# Patient Record
Sex: MALE | Race: OTHER | Employment: FULL TIME | URBAN - METROPOLITAN AREA
[De-identification: names, ages, dates, MRNs, and addresses within clinical notes are randomized per-mention and may not be internally consistent; named-entity substitution may affect disease eponyms.]

---

## 2018-11-25 ENCOUNTER — APPOINTMENT (OUTPATIENT)
Dept: GENERAL RADIOLOGY | Age: 43
End: 2018-11-25
Attending: FAMILY MEDICINE
Payer: OTHER MISCELLANEOUS

## 2018-11-25 ENCOUNTER — HOSPITAL ENCOUNTER (OUTPATIENT)
Age: 43
Discharge: HOME | End: 2018-11-25
Attending: FAMILY MEDICINE
Payer: OTHER MISCELLANEOUS

## 2018-11-25 VITALS
WEIGHT: 160 LBS | HEART RATE: 63 BPM | TEMPERATURE: 98 F | HEIGHT: 66 IN | BODY MASS INDEX: 25.71 KG/M2 | RESPIRATION RATE: 16 BRPM | SYSTOLIC BLOOD PRESSURE: 142 MMHG | DIASTOLIC BLOOD PRESSURE: 88 MMHG | OXYGEN SATURATION: 97 %
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
## 2018-11-25 DIAGNOSIS — M62.830 BACK MUSCLE SPASM: Primary | ICD-10-CM

## 2018-11-25 DIAGNOSIS — M54.5 ACUTE LOW BACK PAIN, UNSPECIFIED BACK PAIN LATERALITY, WITH SCIATICA PRESENCE UNSPECIFIED: ICD-10-CM

## 2018-11-25 PROCEDURE — 99203 OFFICE O/P NEW LOW 30 MIN: CPT

## 2018-11-25 PROCEDURE — 72110 X-RAY EXAM L-2 SPINE 4/>VWS: CPT | Performed by: FAMILY MEDICINE

## 2018-11-25 PROCEDURE — 99204 OFFICE O/P NEW MOD 45 MIN: CPT

## 2018-11-25 RX ORDER — CYCLOBENZAPRINE HCL 10 MG
10 TABLET ORAL 3 TIMES DAILY PRN
Qty: 15 TABLET | Refills: 0 | Status: SHIPPED | OUTPATIENT
Start: 2018-11-25 | End: 2018-11-30

## 2018-11-25 NOTE — ED INITIAL ASSESSMENT (HPI)
Patient reports he had an accident today at work. Patient reports he was picking up cases and hurt his back.

## 2018-11-25 NOTE — ED PROVIDER NOTES
Patient Seen in: [facility]    History   Patient presents with:  Back Pain (musculoskeletal)    Stated Complaint: W/C INJURY, BACK PAIN    HPI  This is a 49-year-old male here with complaints of lower back pain/spasm status post cyanosis, clubbing, or edema bilaterally . MS +5/5 both upper and lower extremities and FROM noted.      Back:   Symmetric - no scoliosis or kyphosis   No vertebral tenderness noted   No step offs   No swelling or masses   Tenderness noted over the paravert correlation recommended. MRI could be performed for further evaluation as clinically directed. Mild degenerative disc space loss and minimal endplate spurring scattered in the lumbar spine. Normal mineralization. Unremarkable soft tissues.   No pars def injury    Disposition:  Discharge  11/25/2018  1:11 pm    Follow-up:  AMADA [address]  [address] [address]  [phone]    On 11/27/2018  For re-check on symptoms        Medications Prescribed:  Current Discharge Medication List    S

## 2018-11-27 NOTE — ED NOTES
Called   Kurt Meza ([id number])  In three way call. Pt states that pain is  Worse pain scale of  9/10. Pt  Took ibuprofen 2 tabs this morning. Pt was instructed to take 4 tabs every 8 hrs to be taken with food.  Pt was also instructed to take  Cy

## 2020-12-30 ENCOUNTER — TRANSCRIPTION ENCOUNTER (OUTPATIENT)
Age: 45
End: 2020-12-30

## 2021-08-27 ENCOUNTER — TRANSCRIPTION ENCOUNTER (OUTPATIENT)
Age: 46
End: 2021-08-27

## 2021-09-09 ENCOUNTER — TRANSCRIPTION ENCOUNTER (OUTPATIENT)
Age: 46
End: 2021-09-09

## 2021-09-23 ENCOUNTER — TRANSCRIPTION ENCOUNTER (OUTPATIENT)
Age: 46
End: 2021-09-23

## 2021-09-30 ENCOUNTER — TRANSCRIPTION ENCOUNTER (OUTPATIENT)
Age: 46
End: 2021-09-30

## 2021-10-15 ENCOUNTER — TRANSCRIPTION ENCOUNTER (OUTPATIENT)
Age: 46
End: 2021-10-15

## 2021-10-21 ENCOUNTER — TRANSCRIPTION ENCOUNTER (OUTPATIENT)
Age: 46
End: 2021-10-21

## 2022-09-13 ENCOUNTER — NON-APPOINTMENT (OUTPATIENT)
Age: 47
End: 2022-09-13

## 2022-09-13 ENCOUNTER — APPOINTMENT (OUTPATIENT)
Dept: INTERNAL MEDICINE | Facility: CLINIC | Age: 47
End: 2022-09-13

## 2022-09-13 VITALS
OXYGEN SATURATION: 97 % | HEART RATE: 91 BPM | SYSTOLIC BLOOD PRESSURE: 180 MMHG | TEMPERATURE: 98 F | BODY MASS INDEX: 26.53 KG/M2 | DIASTOLIC BLOOD PRESSURE: 109 MMHG | HEIGHT: 67 IN | WEIGHT: 169 LBS

## 2022-09-13 VITALS
HEIGHT: 67 IN | HEART RATE: 91 BPM | TEMPERATURE: 98 F | OXYGEN SATURATION: 97 % | WEIGHT: 169 LBS | SYSTOLIC BLOOD PRESSURE: 160 MMHG | RESPIRATION RATE: 18 BRPM | BODY MASS INDEX: 26.53 KG/M2 | DIASTOLIC BLOOD PRESSURE: 95 MMHG

## 2022-09-13 DIAGNOSIS — Z13.220 ENCOUNTER FOR SCREENING FOR LIPOID DISORDERS: ICD-10-CM

## 2022-09-13 DIAGNOSIS — Z78.9 OTHER SPECIFIED HEALTH STATUS: ICD-10-CM

## 2022-09-13 DIAGNOSIS — Z13.21 ENCOUNTER FOR SCREENING FOR NUTRITIONAL DISORDER: ICD-10-CM

## 2022-09-13 DIAGNOSIS — R03.0 ELEVATED BLOOD-PRESSURE READING, W/OUT DIAGNOSIS OF HYPERTENSION: ICD-10-CM

## 2022-09-13 DIAGNOSIS — Z13.29 ENCOUNTER FOR SCREENING FOR OTHER SUSPECTED ENDOCRINE DISORDER: ICD-10-CM

## 2022-09-13 DIAGNOSIS — Z13.1 ENCOUNTER FOR SCREENING FOR DIABETES MELLITUS: ICD-10-CM

## 2022-09-13 DIAGNOSIS — Z82.61 FAMILY HISTORY OF ARTHRITIS: ICD-10-CM

## 2022-09-13 DIAGNOSIS — N20.0 CALCULUS OF KIDNEY: ICD-10-CM

## 2022-09-13 DIAGNOSIS — Z83.3 FAMILY HISTORY OF DIABETES MELLITUS: ICD-10-CM

## 2022-09-13 PROCEDURE — 93000 ELECTROCARDIOGRAM COMPLETE: CPT | Mod: 59

## 2022-09-13 PROCEDURE — G0442 ANNUAL ALCOHOL SCREEN 15 MIN: CPT

## 2022-09-13 PROCEDURE — 99386 PREV VISIT NEW AGE 40-64: CPT | Mod: 25

## 2022-09-13 PROCEDURE — 36415 COLL VENOUS BLD VENIPUNCTURE: CPT

## 2022-09-13 PROCEDURE — G0444 DEPRESSION SCREEN ANNUAL: CPT | Mod: 59

## 2022-09-13 NOTE — COUNSELING
[Fall prevention counseling provided] : Fall prevention counseling provided [Adequate lighting] : Adequate lighting [No throw rugs] : No throw rugs [Use proper foot wear] : Use proper foot wear [Use recommended devices] : Use recommended devices [None] : None

## 2022-09-13 NOTE — PLAN
[FreeTextEntry1] : Well adult exam is performed. Recommend  to do a blood test today, further management will depend on the blood test results. \par Has an elevated BP reading, EKG was done and reviewed NSR 85 bpm, no acute st-t changes, recommend  low salt diet, decrease caffeine intake. Start taking HCTZ 12.5mg qd, monitor BP reading at home\par F/u with GI(screening colonoscopy)\par  RTC to f/u in 2 wks. Patient is verbalized understanding\par

## 2022-09-13 NOTE — HISTORY OF PRESENT ILLNESS
[FreeTextEntry1] : Adult well exam [de-identified] : Mr. BRUNER COLON 47 year  male  with a no significant  PMH  present to the office for a physical exam.  Patient feels good, denies complaints at present time. Did not see PCP for a long period of time\par

## 2022-09-13 NOTE — HEALTH RISK ASSESSMENT
[Good] : ~his/her~  mood as  good [Never] : Never [Yes] : Yes [Monthly or less (1 pt)] : Monthly or less (1 point) [1 or 2 (0 pts)] : 1 or 2 (0 points) [Never (0 pts)] : Never (0 points) [No] : In the past 12 months have you used drugs other than those required for medical reasons? No [No falls in past year] : Patient reported no falls in the past year [0] : 2) Feeling down, depressed, or hopeless: Not at all (0) [PHQ-2 Negative - No further assessment needed] : PHQ-2 Negative - No further assessment needed [No Retinopathy] : No retinopathy [None] : None [With Family] : lives with family [# of Members in Household ___] :  household currently consist of [unfilled] member(s) [Employed] : employed [Graduate School] : graduate school [] :  [# Of Children ___] : has [unfilled] children [Sexually Active] : sexually active [Feels Safe at Home] : Feels safe at home [Fully functional (bathing, dressing, toileting, transferring, walking, feeding)] : Fully functional (bathing, dressing, toileting, transferring, walking, feeding) [Fully functional (using the telephone, shopping, preparing meals, housekeeping, doing laundry, using] : Fully functional and needs no help or supervision to perform IADLs (using the telephone, shopping, preparing meals, housekeeping, doing laundry, using transportation, managing medications and managing finances) [Smoke Detector] : smoke detector [Carbon Monoxide Detector] : carbon monoxide detector [Guns at Home] : guns at home [Seat Belt] :  uses seat belt [Sunscreen] : uses sunscreen [Aggressive treatment] : aggressive treatment [I will adhere to the patient's wishes.] : I will adhere to the patient's wishes. [HIV Test offered] : HIV Test offered [Hepatitis C test offered] : Hepatitis C test offered [Audit-CScore] : 1 [de-identified] : walks [de-identified] : Regular diet (high sodium) [AHT8Kbtwa] : 0 [EyeExamDate] : 09/2021 [Change in mental status noted] : No change in mental status noted [Language] : denies difficulty with language [Learning/Retaining New Information] : denies difficulty learning/retaining new information [Handling Complex Tasks] : denies difficulty handling complex tasks [Reports changes in hearing] : Reports no changes in hearing [Reports changes in vision] : Reports no changes in vision [Reports changes in dental health] : Reports no changes in dental health [TB Exposure] : is not being exposed to tuberculosis [Caregiver Concerns] : does not have caregiver concerns [ColonoscopyComments] : never had [FreeTextEntry2] : Teacher [FreeTextEntry3] : 2 [de-identified] : Father in law owns gun (former ) [FreeTextEntry4] : Do not have a health care proxy

## 2022-09-19 LAB
25(OH)D3 SERPL-MCNC: 34 NG/ML
ALBUMIN SERPL ELPH-MCNC: 4.2 G/DL
ALP BLD-CCNC: 54 U/L
ALT SERPL-CCNC: 26 U/L
ANION GAP SERPL CALC-SCNC: 13 MMOL/L
APPEARANCE: CLEAR
AST SERPL-CCNC: 23 U/L
BASOPHILS # BLD AUTO: 0.08 K/UL
BASOPHILS NFR BLD AUTO: 0.9 %
BILIRUB SERPL-MCNC: 0.4 MG/DL
BILIRUBIN URINE: NEGATIVE
BLOOD URINE: NEGATIVE
BUN SERPL-MCNC: 9 MG/DL
CALCIUM SERPL-MCNC: 9.4 MG/DL
CHLORIDE SERPL-SCNC: 106 MMOL/L
CHOLEST SERPL-MCNC: 145 MG/DL
CO2 SERPL-SCNC: 25 MMOL/L
COLOR: NORMAL
CREAT SERPL-MCNC: 1.04 MG/DL
EGFR: 88 ML/MIN/1.73M2
EOSINOPHIL # BLD AUTO: 0.17 K/UL
EOSINOPHIL NFR BLD AUTO: 2 %
ESTIMATED AVERAGE GLUCOSE: 137 MG/DL
GLUCOSE QUALITATIVE U: NORMAL
GLUCOSE SERPL-MCNC: 129 MG/DL
HBA1C MFR BLD HPLC: 6.4 %
HBV CORE IGG+IGM SER QL: NONREACTIVE
HBV SURFACE AB SER QL: NONREACTIVE
HBV SURFACE AG SER QL: NONREACTIVE
HCT VFR BLD CALC: 43.3 %
HCV AB SER QL: NONREACTIVE
HCV S/CO RATIO: 0.08 S/CO
HDLC SERPL-MCNC: 61 MG/DL
HGB BLD-MCNC: 14.4 G/DL
HIV1+2 AB SPEC QL IA.RAPID: NONREACTIVE
HSV 1+2 IGG SER IA-IMP: NEGATIVE
HSV 1+2 IGG SER IA-IMP: NEGATIVE
HSV1 IGG SER QL: 0.54 INDEX
HSV2 IGG SER QL: 0.33 INDEX
IMM GRANULOCYTES NFR BLD AUTO: 0.2 %
IRON SATN MFR SERPL: 22 %
IRON SERPL-MCNC: 72 UG/DL
KETONES URINE: NEGATIVE
LDLC SERPL CALC-MCNC: 72 MG/DL
LEUKOCYTE ESTERASE URINE: NEGATIVE
LYMPHOCYTES # BLD AUTO: 2.05 K/UL
LYMPHOCYTES NFR BLD AUTO: 23.6 %
MAN DIFF?: NORMAL
MCHC RBC-ENTMCNC: 30.3 PG
MCHC RBC-ENTMCNC: 33.3 GM/DL
MCV RBC AUTO: 91 FL
MONOCYTES # BLD AUTO: 1 K/UL
MONOCYTES NFR BLD AUTO: 11.5 %
NEUTROPHILS # BLD AUTO: 5.36 K/UL
NEUTROPHILS NFR BLD AUTO: 61.8 %
NITRITE URINE: NEGATIVE
NONHDLC SERPL-MCNC: 84 MG/DL
PH URINE: 6
PLATELET # BLD AUTO: 246 K/UL
POTASSIUM SERPL-SCNC: 4.2 MMOL/L
PROT SERPL-MCNC: 6.5 G/DL
PROTEIN URINE: NEGATIVE
PSA FREE FLD-MCNC: 31 %
PSA FREE SERPL-MCNC: 0.41 NG/ML
PSA SERPL-MCNC: 1.3 NG/ML
RBC # BLD: 4.76 M/UL
RBC # FLD: 14.5 %
SODIUM SERPL-SCNC: 143 MMOL/L
SPECIFIC GRAVITY URINE: 1.02
T PALLIDUM AB SER QL IA: NEGATIVE
TIBC SERPL-MCNC: 327 UG/DL
TRIGL SERPL-MCNC: 61 MG/DL
TSH SERPL-ACNC: 1.27 UIU/ML
UIBC SERPL-MCNC: 256 UG/DL
UROBILINOGEN URINE: NORMAL
WBC # FLD AUTO: 8.68 K/UL

## 2022-10-12 RX ORDER — HYDROCHLOROTHIAZIDE 12.5 MG/1
12.5 CAPSULE ORAL DAILY
Qty: 30 | Refills: 3 | Status: ACTIVE | COMMUNITY
Start: 2022-09-13 | End: 1900-01-01

## 2023-12-09 ENCOUNTER — LABORATORY RESULT (OUTPATIENT)
Age: 48
End: 2023-12-09

## 2023-12-09 ENCOUNTER — NON-APPOINTMENT (OUTPATIENT)
Age: 48
End: 2023-12-09

## 2023-12-09 ENCOUNTER — APPOINTMENT (OUTPATIENT)
Dept: FAMILY MEDICINE | Facility: CLINIC | Age: 48
End: 2023-12-09
Payer: COMMERCIAL

## 2023-12-09 VITALS
SYSTOLIC BLOOD PRESSURE: 162 MMHG | DIASTOLIC BLOOD PRESSURE: 94 MMHG | WEIGHT: 159.44 LBS | RESPIRATION RATE: 16 BRPM | HEART RATE: 91 BPM | TEMPERATURE: 97.2 F | BODY MASS INDEX: 25.02 KG/M2 | OXYGEN SATURATION: 100 % | HEIGHT: 67 IN

## 2023-12-09 DIAGNOSIS — Z12.5 ENCOUNTER FOR SCREENING FOR MALIGNANT NEOPLASM OF PROSTATE: ICD-10-CM

## 2023-12-09 DIAGNOSIS — Z00.00 ENCOUNTER FOR GENERAL ADULT MEDICAL EXAMINATION W/OUT ABNORMAL FINDINGS: ICD-10-CM

## 2023-12-09 DIAGNOSIS — E55.9 VITAMIN D DEFICIENCY, UNSPECIFIED: ICD-10-CM

## 2023-12-09 DIAGNOSIS — Z82.49 FAMILY HISTORY OF ISCHEMIC HEART DISEASE AND OTHER DISEASES OF THE CIRCULATORY SYSTEM: ICD-10-CM

## 2023-12-09 DIAGNOSIS — Z12.11 ENCOUNTER FOR SCREENING FOR MALIGNANT NEOPLASM OF COLON: ICD-10-CM

## 2023-12-09 DIAGNOSIS — I10 ESSENTIAL (PRIMARY) HYPERTENSION: ICD-10-CM

## 2023-12-09 DIAGNOSIS — R73.03 PREDIABETES.: ICD-10-CM

## 2023-12-09 PROCEDURE — 99213 OFFICE O/P EST LOW 20 MIN: CPT | Mod: 25

## 2023-12-09 PROCEDURE — 99396 PREV VISIT EST AGE 40-64: CPT | Mod: 25

## 2023-12-13 DIAGNOSIS — D64.9 ANEMIA, UNSPECIFIED: ICD-10-CM

## 2023-12-13 LAB
25(OH)D3 SERPL-MCNC: 22.8 NG/ML
ALBUMIN SERPL ELPH-MCNC: 4.1 G/DL
ALP BLD-CCNC: 63 U/L
ALT SERPL-CCNC: 12 U/L
ANION GAP SERPL CALC-SCNC: 11 MMOL/L
APPEARANCE: CLEAR
AST SERPL-CCNC: 17 U/L
BASOPHILS # BLD AUTO: 0.09 K/UL
BASOPHILS NFR BLD AUTO: 0.7 %
BILIRUB SERPL-MCNC: 0.3 MG/DL
BILIRUBIN URINE: NEGATIVE
BLOOD URINE: ABNORMAL
BUN SERPL-MCNC: 14 MG/DL
CALCIUM SERPL-MCNC: 8.9 MG/DL
CHLORIDE SERPL-SCNC: 105 MMOL/L
CHOLEST SERPL-MCNC: 131 MG/DL
CO2 SERPL-SCNC: 23 MMOL/L
COLOR: YELLOW
CREAT SERPL-MCNC: 0.92 MG/DL
EGFR: 101 ML/MIN/1.73M2
EOSINOPHIL # BLD AUTO: 0.18 K/UL
EOSINOPHIL NFR BLD AUTO: 1.5 %
ESTIMATED AVERAGE GLUCOSE: 146 MG/DL
GLUCOSE QUALITATIVE U: NEGATIVE MG/DL
GLUCOSE SERPL-MCNC: 127 MG/DL
HBA1C MFR BLD HPLC: 6.7 %
HCT VFR BLD CALC: 33.8 %
HDLC SERPL-MCNC: 63 MG/DL
HGB BLD-MCNC: 9.4 G/DL
IMM GRANULOCYTES NFR BLD AUTO: 0.2 %
KETONES URINE: NEGATIVE MG/DL
LDLC SERPL CALC-MCNC: 58 MG/DL
LEUKOCYTE ESTERASE URINE: NEGATIVE
LYMPHOCYTES # BLD AUTO: 1.64 K/UL
LYMPHOCYTES NFR BLD AUTO: 13.6 %
MAN DIFF?: NORMAL
MCHC RBC-ENTMCNC: 20.3 PG
MCHC RBC-ENTMCNC: 27.8 GM/DL
MCV RBC AUTO: 73.2 FL
MONOCYTES # BLD AUTO: 1.36 K/UL
MONOCYTES NFR BLD AUTO: 11.2 %
NEUTROPHILS # BLD AUTO: 8.8 K/UL
NEUTROPHILS NFR BLD AUTO: 72.8 %
NITRITE URINE: NEGATIVE
NONHDLC SERPL-MCNC: 67 MG/DL
PH URINE: 6
PLATELET # BLD AUTO: 418 K/UL
POTASSIUM SERPL-SCNC: 4.4 MMOL/L
PROT SERPL-MCNC: 6.6 G/DL
PROTEIN URINE: NEGATIVE MG/DL
PSA SERPL-MCNC: 2.13 NG/ML
RBC # BLD: 4.62 M/UL
RBC # FLD: 19.8 %
SODIUM SERPL-SCNC: 139 MMOL/L
SPECIFIC GRAVITY URINE: 1.01
TRIGL SERPL-MCNC: 33 MG/DL
TSH SERPL-ACNC: 1.11 UIU/ML
UROBILINOGEN URINE: 0.2 MG/DL
WBC # FLD AUTO: 12.1 K/UL

## 2024-03-18 ENCOUNTER — RX RENEWAL (OUTPATIENT)
Age: 49
End: 2024-03-18

## 2024-06-17 RX ORDER — LOSARTAN POTASSIUM 50 MG/1
50 TABLET, FILM COATED ORAL
Qty: 30 | Refills: 0 | Status: ACTIVE | COMMUNITY
Start: 2023-12-09 | End: 1900-01-01

## 2024-07-10 ENCOUNTER — NON-APPOINTMENT (OUTPATIENT)
Age: 49
End: 2024-07-10

## 2024-07-10 ENCOUNTER — APPOINTMENT (OUTPATIENT)
Dept: FAMILY MEDICINE | Facility: CLINIC | Age: 49
End: 2024-07-10
Payer: COMMERCIAL

## 2024-07-10 ENCOUNTER — LABORATORY RESULT (OUTPATIENT)
Age: 49
End: 2024-07-10

## 2024-07-10 VITALS
OXYGEN SATURATION: 98 % | BODY MASS INDEX: 21.5 KG/M2 | HEIGHT: 67 IN | DIASTOLIC BLOOD PRESSURE: 80 MMHG | WEIGHT: 137 LBS | HEART RATE: 103 BPM | TEMPERATURE: 98.3 F | RESPIRATION RATE: 16 BRPM | SYSTOLIC BLOOD PRESSURE: 131 MMHG

## 2024-07-10 DIAGNOSIS — R10.9 UNSPECIFIED ABDOMINAL PAIN: ICD-10-CM

## 2024-07-10 DIAGNOSIS — E11.9 TYPE 2 DIABETES MELLITUS W/OUT COMPLICATIONS: ICD-10-CM

## 2024-07-10 DIAGNOSIS — R55 SYNCOPE AND COLLAPSE: ICD-10-CM

## 2024-07-10 DIAGNOSIS — R63.4 ABNORMAL WEIGHT LOSS: ICD-10-CM

## 2024-07-10 DIAGNOSIS — D64.9 ANEMIA, UNSPECIFIED: ICD-10-CM

## 2024-07-10 PROCEDURE — 99214 OFFICE O/P EST MOD 30 MIN: CPT | Mod: 25

## 2024-07-10 PROCEDURE — 93000 ELECTROCARDIOGRAM COMPLETE: CPT

## 2024-07-10 PROCEDURE — 36415 COLL VENOUS BLD VENIPUNCTURE: CPT

## 2024-07-11 ENCOUNTER — INPATIENT (INPATIENT)
Facility: HOSPITAL | Age: 49
LOS: 1 days | Discharge: HOME | End: 2024-07-13
Attending: HOSPITALIST | Admitting: HOSPITALIST
Payer: COMMERCIAL

## 2024-07-11 ENCOUNTER — NON-APPOINTMENT (OUTPATIENT)
Age: 49
End: 2024-07-11

## 2024-07-11 ENCOUNTER — TRANSCRIPTION ENCOUNTER (OUTPATIENT)
Age: 49
End: 2024-07-11

## 2024-07-11 VITALS
WEIGHT: 138.01 LBS | HEIGHT: 66 IN | DIASTOLIC BLOOD PRESSURE: 85 MMHG | TEMPERATURE: 98 F | SYSTOLIC BLOOD PRESSURE: 129 MMHG | HEART RATE: 97 BPM | OXYGEN SATURATION: 100 % | RESPIRATION RATE: 19 BRPM

## 2024-07-11 DIAGNOSIS — R55 SYNCOPE AND COLLAPSE: ICD-10-CM

## 2024-07-11 DIAGNOSIS — I10 ESSENTIAL (PRIMARY) HYPERTENSION: ICD-10-CM

## 2024-07-11 DIAGNOSIS — K31.89 OTHER DISEASES OF STOMACH AND DUODENUM: ICD-10-CM

## 2024-07-11 DIAGNOSIS — D64.9 ANEMIA, UNSPECIFIED: ICD-10-CM

## 2024-07-11 LAB
ALBUMIN SERPL ELPH-MCNC: 3.1 G/DL — LOW (ref 3.3–5)
ALBUMIN SERPL ELPH-MCNC: 3.8 G/DL
ALP BLD-CCNC: 71 U/L
ALP SERPL-CCNC: 60 U/L — SIGNIFICANT CHANGE UP (ref 40–120)
ALT FLD-CCNC: 11 U/L — LOW (ref 12–78)
ALT SERPL-CCNC: 10 U/L
ANION GAP SERPL CALC-SCNC: 19 MMOL/L
ANION GAP SERPL CALC-SCNC: 6 MMOL/L — SIGNIFICANT CHANGE UP (ref 5–17)
ANISOCYTOSIS BLD QL: SIGNIFICANT CHANGE UP
APTT BLD: 32.4 SEC — SIGNIFICANT CHANGE UP (ref 24.5–35.6)
AST SERPL-CCNC: 14 U/L
AST SERPL-CCNC: 9 U/L — LOW (ref 15–37)
BASOPHILS # BLD AUTO: 0.05 K/UL — SIGNIFICANT CHANGE UP (ref 0–0.2)
BASOPHILS NFR BLD AUTO: 0.4 % — SIGNIFICANT CHANGE UP (ref 0–2)
BASOPHILS NFR BLD AUTO: 0.7 %
BILIRUB SERPL-MCNC: 0.4 MG/DL
BILIRUB SERPL-MCNC: 0.5 MG/DL — SIGNIFICANT CHANGE UP (ref 0.2–1.2)
BLD GP AB SCN SERPL QL: SIGNIFICANT CHANGE UP
BUN SERPL-MCNC: 13 MG/DL
BUN SERPL-MCNC: 13 MG/DL — SIGNIFICANT CHANGE UP (ref 7–23)
CALCIUM SERPL-MCNC: 8.7 MG/DL
CALCIUM SERPL-MCNC: 9.4 MG/DL — SIGNIFICANT CHANGE UP (ref 8.5–10.1)
CHLORIDE SERPL-SCNC: 105 MMOL/L — SIGNIFICANT CHANGE UP (ref 96–108)
CHLORIDE SERPL-SCNC: 99 MMOL/L
CHOLEST SERPL-MCNC: 128 MG/DL
CO2 SERPL-SCNC: 22 MMOL/L
CO2 SERPL-SCNC: 28 MMOL/L — SIGNIFICANT CHANGE UP (ref 22–31)
CREAT SERPL-MCNC: 0.82 MG/DL — SIGNIFICANT CHANGE UP (ref 0.5–1.3)
CREAT SERPL-MCNC: 0.9 MG/DL
CREAT SPEC-SCNC: 122 MG/DL
DACRYOCYTES BLD QL SMEAR: SLIGHT — SIGNIFICANT CHANGE UP
EGFR: 103 ML/MIN/1.73M2
EGFR: 87 ML/MIN/1.73M2 — SIGNIFICANT CHANGE UP
EGFR: 87 ML/MIN/1.73M2 — SIGNIFICANT CHANGE UP
EOSINOPHIL # BLD AUTO: 0.05 K/UL — SIGNIFICANT CHANGE UP (ref 0–0.5)
EOSINOPHIL # BLD AUTO: 0.06 K/UL
EOSINOPHIL NFR BLD AUTO: 0.4 % — SIGNIFICANT CHANGE UP (ref 0–6)
EOSINOPHIL NFR BLD AUTO: 0.5 %
ESTIMATED AVERAGE GLUCOSE: 123 MG/DL
FERRITIN SERPL-MCNC: 2 NG/ML — LOW (ref 30–400)
FERRITIN SERPL-MCNC: 5 NG/ML
FOLATE SERPL-MCNC: 6.5 NG/ML
GLUCOSE SERPL-MCNC: 128 MG/DL — HIGH (ref 70–99)
GLUCOSE SERPL-MCNC: 73 MG/DL
HBA1C MFR BLD HPLC: 5.9 %
HCT VFR BLD CALC: 20.2 % — CRITICAL LOW (ref 34.5–45)
HCT VFR BLD CALC: 20.3 %
HGB BLD-MCNC: 4.9 G/DL
HGB BLD-MCNC: 5.2 G/DL — CRITICAL LOW (ref 11.5–15.5)
HYPOCHROMIA BLD QL: SIGNIFICANT CHANGE UP
IMM GRANULOCYTES NFR BLD AUTO: 0.4 %
IMM GRANULOCYTES NFR BLD AUTO: 0.4 % — SIGNIFICANT CHANGE UP (ref 0–0.9)
INR BLD: 1.09 RATIO — SIGNIFICANT CHANGE UP (ref 0.85–1.18)
IRON SATN MFR SERPL: 2 % — LOW (ref 16–55)
IRON SATN MFR SERPL: 3 %
IRON SATN MFR SERPL: 7 UG/DL — LOW (ref 45–165)
IRON SERPL-MCNC: 9 UG/DL
LACTATE SERPL-SCNC: 1.2 MMOL/L — SIGNIFICANT CHANGE UP (ref 0.7–2)
LDLC SERPL CALC-MCNC: 63 MG/DL
LYMPHOCYTES # BLD AUTO: 1 K/UL — SIGNIFICANT CHANGE UP (ref 1–3.3)
LYMPHOCYTES # BLD AUTO: 1.55 K/UL
LYMPHOCYTES # BLD AUTO: 9 % — LOW (ref 13–44)
LYMPHOCYTES NFR BLD AUTO: 12.7 %
MAN DIFF?: NORMAL
MANUAL SMEAR VERIFICATION: SIGNIFICANT CHANGE UP
MCHC RBC-ENTMCNC: 16.2 PG
MCHC RBC-ENTMCNC: 16.6 PG — LOW (ref 27–34)
MCHC RBC-ENTMCNC: 24.1 GM/DL
MCHC RBC-ENTMCNC: 25.7 G/DL — LOW (ref 32–36)
MCV RBC AUTO: 64.3 FL — LOW (ref 80–100)
MCV RBC AUTO: 67 FL
MICROALBUMIN 24H UR DL<=1MG/L-MCNC: <1.2 MG/DL
MICROALBUMIN/CREAT 24H UR-RTO: NORMAL MG/G
MICROCYTES BLD QL: SIGNIFICANT CHANGE UP
MONOCYTES # BLD AUTO: 1.01 K/UL — HIGH (ref 0–0.9)
MONOCYTES # BLD AUTO: 1.27 K/UL
MONOCYTES NFR BLD AUTO: 10.4 %
MONOCYTES NFR BLD AUTO: 9 % — SIGNIFICANT CHANGE UP (ref 2–14)
NEUTROPHILS # BLD AUTO: 9.02 K/UL — HIGH (ref 1.8–7.4)
NEUTROPHILS # BLD AUTO: 9.17 K/UL
NEUTROPHILS NFR BLD AUTO: 80.8 % — HIGH (ref 43–77)
NONHDLC SERPL-MCNC: 74 MG/DL
NRBC # BLD: 0 /100 WBCS — SIGNIFICANT CHANGE UP (ref 0–0)
NRBC BLD-RTO: 0 /100 WBCS — SIGNIFICANT CHANGE UP (ref 0–0)
OB PNL STL: POSITIVE
OVALOCYTES BLD QL SMEAR: SLIGHT — SIGNIFICANT CHANGE UP
PLAT MORPH BLD: NORMAL — SIGNIFICANT CHANGE UP
PLATELET # BLD AUTO: 518 K/UL — HIGH (ref 150–400)
PLATELET # BLD AUTO: 527 K/UL
POIKILOCYTOSIS BLD QL AUTO: SIGNIFICANT CHANGE UP
POLYCHROMASIA BLD QL SMEAR: SLIGHT — SIGNIFICANT CHANGE UP
POTASSIUM SERPL-MCNC: 4 MMOL/L — SIGNIFICANT CHANGE UP (ref 3.5–5.3)
POTASSIUM SERPL-SCNC: 4 MMOL/L — SIGNIFICANT CHANGE UP (ref 3.5–5.3)
POTASSIUM SERPL-SCNC: 4.6 MMOL/L
PROT SERPL-MCNC: 6.8 G/DL
PROT SERPL-MCNC: 7.2 GM/DL — SIGNIFICANT CHANGE UP (ref 6–8.3)
PROTHROM AB SERPL-ACNC: 13.1 SEC — HIGH (ref 9.5–13)
RBC # BLD: 3.03 M/UL
RBC # BLD: 3.14 M/UL — LOW (ref 3.8–5.2)
RBC # FLD: 20.2 % — HIGH (ref 10.3–14.5)
RBC # FLD: 20.6 %
RBC BLD AUTO: ABNORMAL
SODIUM SERPL-SCNC: 139 MMOL/L — SIGNIFICANT CHANGE UP (ref 135–145)
TARGETS BLD QL SMEAR: SLIGHT — SIGNIFICANT CHANGE UP
TIBC SERPL-MCNC: 327 UG/DL — SIGNIFICANT CHANGE UP (ref 220–430)
TIBC SERPL-MCNC: 329 UG/DL
TRANSFERRIN SERPL-MCNC: 260 MG/DL
TRIGL SERPL-MCNC: 51 MG/DL
TROPONIN I, HIGH SENSITIVITY RESULT: 4.7 NG/L — SIGNIFICANT CHANGE UP
TSH SERPL-ACNC: 1.61 UIU/ML
UIBC SERPL-MCNC: 320 UG/DL — SIGNIFICANT CHANGE UP (ref 110–370)
UIBC SERPL-MCNC: 321 UG/DL
VIT B12 SERPL-MCNC: 331 PG/ML
WBC # BLD: 11.17 K/UL — HIGH (ref 3.8–10.5)
WBC # FLD AUTO: 11.17 K/UL — HIGH (ref 3.8–10.5)
WBC # FLD AUTO: 12.18 K/UL

## 2024-07-11 PROCEDURE — 99291 CRITICAL CARE FIRST HOUR: CPT

## 2024-07-11 PROCEDURE — 71045 X-RAY EXAM CHEST 1 VIEW: CPT | Mod: 26

## 2024-07-11 PROCEDURE — 99222 1ST HOSP IP/OBS MODERATE 55: CPT

## 2024-07-11 PROCEDURE — 74177 CT ABD & PELVIS W/CONTRAST: CPT | Mod: 26,MC

## 2024-07-11 PROCEDURE — 93010 ELECTROCARDIOGRAM REPORT: CPT

## 2024-07-11 RX ORDER — LOSARTAN POTASSIUM 100 MG/1
50 TABLET, FILM COATED ORAL DAILY
Refills: 0 | Status: DISCONTINUED | OUTPATIENT
Start: 2024-07-11 | End: 2024-07-12

## 2024-07-11 RX ORDER — ACETAMINOPHEN 500 MG/5ML
650 LIQUID (ML) ORAL EVERY 6 HOURS
Refills: 0 | Status: DISCONTINUED | OUTPATIENT
Start: 2024-07-11 | End: 2024-07-12

## 2024-07-11 RX ORDER — MELATONIN 5 MG
3 TABLET ORAL AT BEDTIME
Refills: 0 | Status: DISCONTINUED | OUTPATIENT
Start: 2024-07-11 | End: 2024-07-12

## 2024-07-11 RX ORDER — SODIUM CHLORIDE 9 G/1000ML
1000 INJECTION, SOLUTION INTRAVENOUS
Refills: 0 | Status: DISCONTINUED | OUTPATIENT
Start: 2024-07-11 | End: 2024-07-12

## 2024-07-11 RX ADMIN — Medication 40 MILLIGRAM(S): at 14:07

## 2024-07-11 RX ADMIN — SODIUM CHLORIDE 125 MILLILITER(S): 9 INJECTION, SOLUTION INTRAVENOUS at 15:06

## 2024-07-12 LAB
ALBUMIN SERPL ELPH-MCNC: 2.7 G/DL — LOW (ref 3.3–5)
ALP SERPL-CCNC: 54 U/L — SIGNIFICANT CHANGE UP (ref 40–120)
ALT FLD-CCNC: 10 U/L — LOW (ref 12–78)
ANION GAP SERPL CALC-SCNC: 4 MMOL/L — LOW (ref 5–17)
AST SERPL-CCNC: 6 U/L — LOW (ref 15–37)
BILIRUB SERPL-MCNC: 0.7 MG/DL — SIGNIFICANT CHANGE UP (ref 0.2–1.2)
BUN SERPL-MCNC: 7 MG/DL — SIGNIFICANT CHANGE UP (ref 7–23)
CALCIUM SERPL-MCNC: 8.5 MG/DL — SIGNIFICANT CHANGE UP (ref 8.5–10.1)
CHLORIDE SERPL-SCNC: 107 MMOL/L — SIGNIFICANT CHANGE UP (ref 96–108)
CO2 SERPL-SCNC: 29 MMOL/L — SIGNIFICANT CHANGE UP (ref 22–31)
CREAT SERPL-MCNC: 0.84 MG/DL — SIGNIFICANT CHANGE UP (ref 0.5–1.3)
EGFR: 106 ML/MIN/1.73M2 — SIGNIFICANT CHANGE UP
EGFR: 106 ML/MIN/1.73M2 — SIGNIFICANT CHANGE UP
FOLATE SERPL-MCNC: 8.3 NG/ML — SIGNIFICANT CHANGE UP
GLUCOSE SERPL-MCNC: 169 MG/DL — HIGH (ref 70–99)
HCT VFR BLD CALC: 23.2 % — LOW (ref 39–50)
HCT VFR BLD CALC: 26.9 % — LOW (ref 39–50)
HCT VFR BLD CALC: 27.1 % — LOW (ref 39–50)
HGB BLD-MCNC: 6.8 G/DL — CRITICAL LOW (ref 13–17)
HGB BLD-MCNC: 7.9 G/DL — LOW (ref 13–17)
HGB BLD-MCNC: 8 G/DL — LOW (ref 13–17)
MAGNESIUM SERPL-MCNC: 2.1 MG/DL — SIGNIFICANT CHANGE UP (ref 1.6–2.6)
MCHC RBC-ENTMCNC: 19.9 PG — LOW (ref 27–34)
MCHC RBC-ENTMCNC: 20.7 PG — LOW (ref 27–34)
MCHC RBC-ENTMCNC: 21.3 PG — LOW (ref 27–34)
MCHC RBC-ENTMCNC: 29.2 G/DL — LOW (ref 32–36)
MCHC RBC-ENTMCNC: 29.3 G/DL — LOW (ref 32–36)
MCHC RBC-ENTMCNC: 29.7 G/DL — LOW (ref 32–36)
MCV RBC AUTO: 68 FL — LOW (ref 80–100)
MCV RBC AUTO: 71.1 FL — LOW (ref 80–100)
MCV RBC AUTO: 71.7 FL — LOW (ref 80–100)
NRBC # BLD: 0 /100 WBCS — SIGNIFICANT CHANGE UP (ref 0–0)
NRBC BLD-RTO: 0 /100 WBCS — SIGNIFICANT CHANGE UP (ref 0–0)
PHOSPHATE SERPL-MCNC: 2.6 MG/DL — SIGNIFICANT CHANGE UP (ref 2.5–4.5)
PLATELET # BLD AUTO: 416 K/UL — HIGH (ref 150–400)
PLATELET # BLD AUTO: 430 K/UL — HIGH (ref 150–400)
PLATELET # BLD AUTO: 438 K/UL — HIGH (ref 150–400)
POTASSIUM SERPL-MCNC: 4.1 MMOL/L — SIGNIFICANT CHANGE UP (ref 3.5–5.3)
POTASSIUM SERPL-SCNC: 4.1 MMOL/L — SIGNIFICANT CHANGE UP (ref 3.5–5.3)
PROT SERPL-MCNC: 6.2 GM/DL — SIGNIFICANT CHANGE UP (ref 6–8.3)
RBC # BLD: 3.41 M/UL — LOW (ref 4.2–5.8)
RBC # BLD: 3.75 M/UL — LOW (ref 4.2–5.8)
RBC # BLD: 3.81 M/UL — LOW (ref 4.2–5.8)
RBC # FLD: 23.8 % — HIGH (ref 10.3–14.5)
RBC # FLD: 24.2 % — HIGH (ref 10.3–14.5)
RBC # FLD: 24.5 % — HIGH (ref 10.3–14.5)
SODIUM SERPL-SCNC: 140 MMOL/L — SIGNIFICANT CHANGE UP (ref 135–145)
VIT B12 SERPL-MCNC: 302 PG/ML — SIGNIFICANT CHANGE UP (ref 232–1245)
WBC # BLD: 9.1 K/UL — SIGNIFICANT CHANGE UP (ref 3.8–10.5)
WBC # BLD: 9.61 K/UL — SIGNIFICANT CHANGE UP (ref 3.8–10.5)
WBC # BLD: 9.71 K/UL — SIGNIFICANT CHANGE UP (ref 3.8–10.5)
WBC # FLD AUTO: 9.1 K/UL — SIGNIFICANT CHANGE UP (ref 3.8–10.5)
WBC # FLD AUTO: 9.61 K/UL — SIGNIFICANT CHANGE UP (ref 3.8–10.5)
WBC # FLD AUTO: 9.71 K/UL — SIGNIFICANT CHANGE UP (ref 3.8–10.5)

## 2024-07-12 PROCEDURE — 88342 IMHCHEM/IMCYTCHM 1ST ANTB: CPT | Mod: 26

## 2024-07-12 PROCEDURE — 99232 SBSQ HOSP IP/OBS MODERATE 35: CPT

## 2024-07-12 PROCEDURE — 88305 TISSUE EXAM BY PATHOLOGIST: CPT | Mod: 26

## 2024-07-12 PROCEDURE — 88341 IMHCHEM/IMCYTCHM EA ADD ANTB: CPT | Mod: 26

## 2024-07-12 PROCEDURE — 99223 1ST HOSP IP/OBS HIGH 75: CPT

## 2024-07-12 PROCEDURE — 88360 TUMOR IMMUNOHISTOCHEM/MANUAL: CPT | Mod: 26,1L,59

## 2024-07-12 PROCEDURE — 88312 SPECIAL STAINS GROUP 1: CPT | Mod: 26

## 2024-07-12 RX ORDER — ONDANSETRON HCL/PF 4 MG/2 ML
4 VIAL (ML) INJECTION ONCE
Refills: 0 | Status: DISCONTINUED | OUTPATIENT
Start: 2024-07-12 | End: 2024-07-12

## 2024-07-12 RX ORDER — SODIUM CHLORIDE 9 G/1000ML
1000 INJECTION, SOLUTION INTRAVENOUS
Refills: 0 | Status: DISCONTINUED | OUTPATIENT
Start: 2024-07-12 | End: 2024-07-12

## 2024-07-12 RX ORDER — MELATONIN 5 MG
3 TABLET ORAL AT BEDTIME
Refills: 0 | Status: DISCONTINUED | OUTPATIENT
Start: 2024-07-12 | End: 2024-07-13

## 2024-07-12 RX ORDER — FENTANYL CITRATE-0.9 % NACL/PF 100MCG/2ML
50 SYRINGE (ML) INTRAVENOUS ONCE
Refills: 0 | Status: DISCONTINUED | OUTPATIENT
Start: 2024-07-12 | End: 2024-07-12

## 2024-07-12 RX ORDER — FENTANYL CITRATE-0.9 % NACL/PF 100MCG/2ML
25 SYRINGE (ML) INTRAVENOUS ONCE
Refills: 0 | Status: DISCONTINUED | OUTPATIENT
Start: 2024-07-12 | End: 2024-07-12

## 2024-07-12 RX ORDER — ONDANSETRON HCL/PF 4 MG/2 ML
4 VIAL (ML) INJECTION ONCE
Refills: 0 | Status: COMPLETED | OUTPATIENT
Start: 2024-07-12 | End: 2024-07-12

## 2024-07-12 RX ADMIN — Medication 4 MILLIGRAM(S): at 15:43

## 2024-07-12 RX ADMIN — SODIUM CHLORIDE 75 MILLILITER(S): 9 INJECTION, SOLUTION INTRAVENOUS at 20:16

## 2024-07-12 RX ADMIN — Medication 40 MILLIGRAM(S): at 08:15

## 2024-07-12 RX ADMIN — Medication 3 MILLIGRAM(S): at 22:27

## 2024-07-12 RX ADMIN — SODIUM CHLORIDE 125 MILLILITER(S): 9 INJECTION, SOLUTION INTRAVENOUS at 08:30

## 2024-07-13 ENCOUNTER — TRANSCRIPTION ENCOUNTER (OUTPATIENT)
Age: 49
End: 2024-07-13

## 2024-07-13 VITALS
TEMPERATURE: 98 F | HEART RATE: 69 BPM | SYSTOLIC BLOOD PRESSURE: 115 MMHG | RESPIRATION RATE: 17 BRPM | DIASTOLIC BLOOD PRESSURE: 72 MMHG | OXYGEN SATURATION: 100 %

## 2024-07-13 LAB
ANION GAP SERPL CALC-SCNC: 5 MMOL/L — SIGNIFICANT CHANGE UP (ref 5–17)
BUN SERPL-MCNC: 10 MG/DL — SIGNIFICANT CHANGE UP (ref 7–23)
CALCIUM SERPL-MCNC: 9.1 MG/DL — SIGNIFICANT CHANGE UP (ref 8.5–10.1)
CHLORIDE SERPL-SCNC: 106 MMOL/L — SIGNIFICANT CHANGE UP (ref 96–108)
CO2 SERPL-SCNC: 29 MMOL/L — SIGNIFICANT CHANGE UP (ref 22–31)
CREAT SERPL-MCNC: 0.86 MG/DL — SIGNIFICANT CHANGE UP (ref 0.5–1.3)
EGFR: 105 ML/MIN/1.73M2 — SIGNIFICANT CHANGE UP
EGFR: 105 ML/MIN/1.73M2 — SIGNIFICANT CHANGE UP
GLUCOSE SERPL-MCNC: 113 MG/DL — HIGH (ref 70–99)
HCT VFR BLD CALC: 31.9 % — LOW (ref 39–50)
HGB BLD-MCNC: 9.3 G/DL — LOW (ref 13–17)
MAGNESIUM SERPL-MCNC: 2.3 MG/DL — SIGNIFICANT CHANGE UP (ref 1.6–2.6)
MCHC RBC-ENTMCNC: 21.4 PG — LOW (ref 27–34)
MCHC RBC-ENTMCNC: 29.2 G/DL — LOW (ref 32–36)
MCV RBC AUTO: 73.5 FL — LOW (ref 80–100)
NRBC # BLD: 0 /100 WBCS — SIGNIFICANT CHANGE UP (ref 0–0)
NRBC BLD-RTO: 0 /100 WBCS — SIGNIFICANT CHANGE UP (ref 0–0)
PHOSPHATE SERPL-MCNC: 4.5 MG/DL — SIGNIFICANT CHANGE UP (ref 2.5–4.5)
PLATELET # BLD AUTO: 433 K/UL — HIGH (ref 150–400)
POTASSIUM SERPL-MCNC: 4.5 MMOL/L — SIGNIFICANT CHANGE UP (ref 3.5–5.3)
POTASSIUM SERPL-SCNC: 4.5 MMOL/L — SIGNIFICANT CHANGE UP (ref 3.5–5.3)
RBC # BLD: 4.34 M/UL — SIGNIFICANT CHANGE UP (ref 4.2–5.8)
RBC # FLD: 24.2 % — HIGH (ref 10.3–14.5)
SODIUM SERPL-SCNC: 140 MMOL/L — SIGNIFICANT CHANGE UP (ref 135–145)
WBC # BLD: 9.36 K/UL — SIGNIFICANT CHANGE UP (ref 3.8–10.5)
WBC # FLD AUTO: 9.36 K/UL — SIGNIFICANT CHANGE UP (ref 3.8–10.5)

## 2024-07-13 PROCEDURE — 99239 HOSP IP/OBS DSCHRG MGMT >30: CPT

## 2024-07-13 RX ADMIN — Medication 40 MILLIGRAM(S): at 12:15

## 2024-07-15 ENCOUNTER — NON-APPOINTMENT (OUTPATIENT)
Age: 49
End: 2024-07-15

## 2024-07-15 RX ORDER — LOSARTAN POTASSIUM 100 MG/1
1 TABLET, FILM COATED ORAL
Refills: 0 | DISCHARGE

## 2024-07-16 ENCOUNTER — NON-APPOINTMENT (OUTPATIENT)
Age: 49
End: 2024-07-16

## 2024-07-17 ENCOUNTER — NON-APPOINTMENT (OUTPATIENT)
Age: 49
End: 2024-07-17

## 2024-07-17 PROBLEM — I10 ESSENTIAL (PRIMARY) HYPERTENSION: Chronic | Status: ACTIVE | Noted: 2024-07-11

## 2024-07-18 ENCOUNTER — NON-APPOINTMENT (OUTPATIENT)
Age: 49
End: 2024-07-18

## 2024-07-18 DIAGNOSIS — C16.9 MALIGNANT NEOPLASM OF STOMACH, UNSPECIFIED: ICD-10-CM

## 2024-07-21 DIAGNOSIS — D64.9 ANEMIA, UNSPECIFIED: ICD-10-CM

## 2024-07-21 DIAGNOSIS — I10 ESSENTIAL (PRIMARY) HYPERTENSION: ICD-10-CM

## 2024-07-21 DIAGNOSIS — K25.4 CHRONIC OR UNSPECIFIED GASTRIC ULCER WITH HEMORRHAGE: ICD-10-CM

## 2024-07-21 DIAGNOSIS — E44.0 MODERATE PROTEIN-CALORIE MALNUTRITION: ICD-10-CM

## 2024-07-21 DIAGNOSIS — D50.0 IRON DEFICIENCY ANEMIA SECONDARY TO BLOOD LOSS (CHRONIC): ICD-10-CM

## 2024-07-21 DIAGNOSIS — C16.5 MALIGNANT NEOPLASM OF LESSER CURVATURE OF STOMACH, UNSPECIFIED: ICD-10-CM

## 2024-07-22 ENCOUNTER — NON-APPOINTMENT (OUTPATIENT)
Age: 49
End: 2024-07-22

## 2024-07-23 LAB — SURGICAL PATHOLOGY STUDY: SIGNIFICANT CHANGE UP

## 2024-07-25 ENCOUNTER — NON-APPOINTMENT (OUTPATIENT)
Age: 49
End: 2024-07-25

## 2024-07-25 ENCOUNTER — APPOINTMENT (OUTPATIENT)
Dept: SURGICAL ONCOLOGY | Facility: CLINIC | Age: 49
End: 2024-07-25

## 2024-07-25 ENCOUNTER — APPOINTMENT (OUTPATIENT)
Dept: CT IMAGING | Facility: CLINIC | Age: 49
End: 2024-07-25

## 2024-07-30 ENCOUNTER — APPOINTMENT (OUTPATIENT)
Dept: SURGICAL ONCOLOGY | Facility: CLINIC | Age: 49
End: 2024-07-30
Payer: COMMERCIAL

## 2024-07-30 ENCOUNTER — APPOINTMENT (OUTPATIENT)
Dept: HEMATOLOGY ONCOLOGY | Facility: CLINIC | Age: 49
End: 2024-07-30
Payer: COMMERCIAL

## 2024-07-30 ENCOUNTER — LABORATORY RESULT (OUTPATIENT)
Age: 49
End: 2024-07-30

## 2024-07-30 VITALS
BODY MASS INDEX: 21.82 KG/M2 | WEIGHT: 139 LBS | SYSTOLIC BLOOD PRESSURE: 132 MMHG | OXYGEN SATURATION: 100 % | HEIGHT: 67 IN | RESPIRATION RATE: 16 BRPM | DIASTOLIC BLOOD PRESSURE: 88 MMHG | HEART RATE: 89 BPM | TEMPERATURE: 98.1 F

## 2024-07-30 DIAGNOSIS — R11.2 NAUSEA WITH VOMITING, UNSPECIFIED: ICD-10-CM

## 2024-07-30 DIAGNOSIS — Z80.3 FAMILY HISTORY OF MALIGNANT NEOPLASM OF BREAST: ICD-10-CM

## 2024-07-30 DIAGNOSIS — Z13.29 ENCOUNTER FOR SCREENING FOR OTHER SUSPECTED ENDOCRINE DISORDER: ICD-10-CM

## 2024-07-30 DIAGNOSIS — T45.1X5A NAUSEA WITH VOMITING, UNSPECIFIED: ICD-10-CM

## 2024-07-30 DIAGNOSIS — C16.9 MALIGNANT NEOPLASM OF STOMACH, UNSPECIFIED: ICD-10-CM

## 2024-07-30 DIAGNOSIS — T45.1X5A TOXIC GASTROENTERITIS AND COLITIS: ICD-10-CM

## 2024-07-30 DIAGNOSIS — Z13.21 ENCOUNTER FOR SCREENING FOR NUTRITIONAL DISORDER: ICD-10-CM

## 2024-07-30 DIAGNOSIS — A04.8 OTHER SPECIFIED BACTERIAL INTESTINAL INFECTIONS: ICD-10-CM

## 2024-07-30 DIAGNOSIS — K52.1 TOXIC GASTROENTERITIS AND COLITIS: ICD-10-CM

## 2024-07-30 DIAGNOSIS — D50.0 IRON DEFICIENCY ANEMIA SECONDARY TO BLOOD LOSS (CHRONIC): ICD-10-CM

## 2024-07-30 PROCEDURE — 99205 OFFICE O/P NEW HI 60 MIN: CPT

## 2024-07-30 PROCEDURE — 99204 OFFICE O/P NEW MOD 45 MIN: CPT

## 2024-07-30 RX ORDER — PANTOPRAZOLE 40 MG/1
40 TABLET, DELAYED RELEASE ORAL
Refills: 0 | Status: ACTIVE | COMMUNITY

## 2024-07-30 RX ORDER — PANTOPRAZOLE 40 MG/1
40 TABLET, DELAYED RELEASE ORAL TWICE DAILY
Qty: 28 | Refills: 0 | Status: ACTIVE | COMMUNITY
Start: 2024-07-30 | End: 1900-01-01

## 2024-07-30 RX ORDER — CLARITHROMYCIN 500 MG/1
500 TABLET, FILM COATED ORAL
Qty: 28 | Refills: 0 | Status: ACTIVE | COMMUNITY
Start: 2024-07-30 | End: 1900-01-01

## 2024-07-30 RX ORDER — AMOXICILLIN 500 MG/1
500 TABLET, FILM COATED ORAL
Qty: 56 | Refills: 0 | Status: ACTIVE | COMMUNITY
Start: 2024-07-30 | End: 1900-01-01

## 2024-07-30 NOTE — RESULTS/DATA
[FreeTextEntry1] : Date: 7/25/24 Study: CT Chest WO ([facility]) Results: No evidence of metastatic disease in the chest  Date: 7/12/24  Study: EGD (BILL) Results: Gastric mass ___ malignant. Pathology: Gastric mass biopsy: Adenocarcinoma, moderately differentiated in background of ulceration and Helicobacter pylori associated gastritis and intestinal metaplasia.

## 2024-07-30 NOTE — ASSESSMENT
[FreeTextEntry1] : Impression) gastric adenocarcinoma  Plan) reviewed clinical information with the patient and his wife.  Has a lesion in the lesser curve of the stomach biopsy-proven adenocarcinoma of the stomach.  On imaging it looks like a bulky lesion.  We discussed treatment options for gastric cancer which can include surgery or chemotherapy before and after surgery based on clinical staging.  I advised a diagnostic laparoscopy to assess the lesion and for peritoneal washings.  If we identify a bulky lesion with no evidence of metastatic disease then appropriate treatment would be chemotherapy followed by surgery and then more postop chemotherapy.  If we see evidence of metastatic disease then palliative chemotherapy would be indicated.  Will attempt to add on for tomorrow for diagnostic laparoscopy in the afternoon.  He will be seeing  While his knee from medical oncology who will discuss potential chemotherapy options.  Ultimately we will make a definitive recommendation after we do the diagnostic laparoscopy.  We discussed the risks and benefits of laparoscopy which include but not limited to postoperative bleeding, infection.  All questions answered.  Nikko Carrasco MD  Chief Surgical Oncology Multidisciplinary GI cancer program [facility]  Professor Surgery [facility]   CC Dr. Mendes  Time spent 60 minutes

## 2024-07-30 NOTE — PHYSICAL EXAM
[Normal] : supple, no neck mass and thyroid not enlarged [Normal] : grossly intact [de-identified] : anicteric [de-identified] : S1,S2, regular rate and rhythm. No murmurs heard. [de-identified] : Clear throughout. No wheezes heard. [de-identified] : warm and dry

## 2024-07-30 NOTE — HISTORY OF PRESENT ILLNESS
[de-identified] : Isrrael Cortez is a 49 year old male here for further evaluation of newly diagnosed gastric cancer.   Oncology history: 1.  gastric adenocarcinoma -presented to [facility] ER on 7/11/24 with low Hgb (4.9 as outpatient), weakness, SOB, epigastric pain, and weight loss of 20lbs over 3 months.  s/p 3 units of PRBC during admission.  labs confirmed iron deficiency -CT AP on7/11/24 showed mural thickening at the gastric antrum may represent gastritis or gastric cancer. Cholelithiasis. 0.9 cm sclerotic focus in the left iliac bone may represent a bone island.  -s/p EGD with a biopsy on 7/12/24,  notable for large ulcerated mass on lesser curvature of stomach,  c/w malignancy. No e/o active bleeding.  Biopsy of the gastric mass confirmed adenocarcinoma, moderately differentiated in background of ulceration and Helicobacter pylori associated gastritis and intestinal metaplasia, MMR intact, HER2/khang:  1+ score, negative.  PD-L1/CPS : 4 -CT chest on 7/25/24 (at [facility] due to insurance reasons) : no evidence of metastatic disease in chest   PMH: HTN( Dx in 12/2023, used to take Losartan 50mg)  PSH:  none  FH: aunt with breast ca SH: nonsmoker, no drug/alcohol,  at high school,  (Wife Cora) with 2 teenagers, lives in [place] [de-identified] : Patient presents to clinic for an initial consultation for gastric cancer.  abdominal pain almost resolved since discharged.  otherwise he feels ok, denies SOB, fever, chest pain, or dizziness.  he is able to perform ADLs w/o assistance.  currently on summer vacation from school.

## 2024-07-30 NOTE — HISTORY OF PRESENT ILLNESS
[de-identified] : Patient Name: FRANC GARZA  MRN: [id number]  [place] MRN: Referring Provider: LEELEE ANDREW MD  Date: 07/30/2024   Diagnosis: gastric cancer  49 year old male presents for evaluation of gastric cancer. 7/4/24 - He suffered a syncopal episode with brief LOC. He later went to see his PCP. 7/10/24 - Saw his PCP, blood work showed a Hgb of 4.9 and was referred to ER. 7/11/24 - He presented to [facility] ER and also endorsed symptoms of weakness, sob, and lethargy, and epigastric pain, weight loss of 20lbs over 3 months. He was treated with 3 units of PRBC and Hgb increased to 9.3. Started on IV protonix for UGI. CT AP on 7/11/24 showed some simple liver cysts, cholelithiasis, mural thickening at the gastric antrum and a 0.9 cm sclerotic focus in the left iliac bone may represent a bone island EGD on 7/12/24 showed a large ulcerated mass on the lesser curvature of the stomach and biopsy of the gastric mass showed adenocarcinoma, mod differentiated, H Pylori +. Both reports are scanned into [employer]. He has not yet been treated for H Pylori. 7/13/24 - Hgb 9.3, Dc'd home. 7/25/24 - CT Chest showed no evidence of metastatic disease.  Currently, Mr. GARZA denies abdominal pain. He feels well today and no longer has early satiety but does continue to have symptoms of heart burn. He is tolerating a regular diet denies any changes to bowel movements or melena. He hasn't lost any more weight.   Functional Status: Mr. GARZA is able to walk up 2 flights of stairs without fatigue or dyspnea.

## 2024-07-30 NOTE — REVIEW OF SYSTEMS
[Fatigue] : fatigue [Recent Change In Weight] : ~T recent weight change [Diarrhea: Grade 0] : Diarrhea: Grade 0 [Negative] : Allergic/Immunologic [Fever] : no fever [Chills] : no chills [Night Sweats] : no night sweats [Abdominal Pain] : no abdominal pain [Vomiting] : no vomiting [Constipation] : no constipation [FreeTextEntry7] : had abdominal pain but now resolved.

## 2024-07-30 NOTE — ASSESSMENT
[FreeTextEntry1] : Impression) gastric adenocarcinoma  Plan) reviewed clinical information with the patient and his wife.  Has a lesion in the lesser curve of the stomach biopsy-proven adenocarcinoma of the stomach.  On imaging it looks like a bulky lesion.  We discussed treatment options for gastric cancer which can include surgery or chemotherapy before and after surgery based on clinical staging.  I advised a diagnostic laparoscopy to assess the lesion and for peritoneal washings.  If we identify a bulky lesion with no evidence of metastatic disease then appropriate treatment would be chemotherapy followed by surgery and then more postop chemotherapy.  If we see evidence of metastatic disease then palliative chemotherapy would be indicated.  Will attempt to add on for tomorrow for diagnostic laparoscopy in the afternoon.  He will be seeing  While his knee from medical oncology who will discuss potential chemotherapy options.  Ultimately we will make a definitive recommendation after we do the diagnostic laparoscopy.  We discussed the risks and benefits of laparoscopy which include but not limited to postoperative bleeding, infection.  All questions answered.  Nikko Carrasco MD  Chief Surgical Oncology Multidisciplinary GI cancer program [facility]  Professor Surgery [facility]   CC Dr. Mendes  Time spent 60 minutes

## 2024-07-30 NOTE — PHYSICAL EXAM
[Normal] : supple, no neck mass and thyroid not enlarged [Normal] : grossly intact [de-identified] : anicteric [de-identified] : S1,S2, regular rate and rhythm. No murmurs heard. [de-identified] : Clear throughout. No wheezes heard. [de-identified] : warm and dry

## 2024-07-30 NOTE — ASSESSMENT
[FreeTextEntry1] : Isrrael Cortez is a 49 year old man who presented with severe iron deficiency anemia. CT scan showed thickening of the gastric antrum concerning for malignancy.  EGD showed an ulcerated mass along the lesser curvature of the stomach;  biopsy confirmed moderately differentiated adenocarcinoma, with H pylori organisms present, MMR intact by IHC.  CT of the chest did not demonstrate any thoracic metastatic disease.  Plan: #.  gastric cancer --diagnosis, imaging and pathology results reviewed w/ the patient and his wife. --The patient also met w/ surgical oncology Dr Nikko Carrasco on the same day as this visit.  Dr Carrasco and I discussed the case. --needs to complete staging.  Next step is diagnostic laparoscopy w/ Dr Carrasco. --depending on laparoscopy results: may need EUS (or PET) to complete the staging evaluation. --if metastatic disease identified - then this would most likely be an incurable disease process;  treatment would consist of chemo +/- immunotherapy.  --if clinical stage II/III disease:  favor neoadjuvant chemotherapy with FLOT regimen followed by surgery.  Discussed FLOT regimen in general w/ pt and his spouse including schedule of treatment, risks and side effects.  Pt would require mediport placement for this regimen.  Following surgical resection of the cancer, adjuvant chemotherapy would also be recommended. --questions regarding prognosis deferred pending completion of staging.  #.  H pylori infection --Rx for triple therapy:  PPI BID + amoxicillin 1 g BID + clarithromycin 500 mg po BID  #  weight loss unintentional --consult dietician  #  iron deficiency anemia --pending CBC today ordered by surgery team --recommend parenteral iron.  Labs from hospitalization 7/2024 confirmed iron deficiency  --risks of parenteral iron discussed.  Will submit request for feraheme IV x 2 doses;  if not covered by insurance then will give infed.  RTC to be determined - pending laparoscopy results.

## 2024-07-30 NOTE — HISTORY OF PRESENT ILLNESS
[de-identified] : Patient Name: FRANC GARZA  MRN: [id number]  [place] MRN: Referring Provider: LEELEE ANDREW MD  Date: 07/30/2024   Diagnosis: gastric cancer  49 year old male presents for evaluation of gastric cancer. 7/4/24 - He suffered a syncopal episode with brief LOC. He later went to see his PCP. 7/10/24 - Saw his PCP, blood work showed a Hgb of 4.9 and was referred to ER. 7/11/24 - He presented to [facility] ER and also endorsed symptoms of weakness, sob, and lethargy, and epigastric pain, weight loss of 20lbs over 3 months. He was treated with 3 units of PRBC and Hgb increased to 9.3. Started on IV protonix for UGI. CT AP on 7/11/24 showed some simple liver cysts, cholelithiasis, mural thickening at the gastric antrum and a 0.9 cm sclerotic focus in the left iliac bone may represent a bone island EGD on 7/12/24 showed a large ulcerated mass on the lesser curvature of the stomach and biopsy of the gastric mass showed adenocarcinoma, mod differentiated, H Pylori +. Both reports are scanned into [employer]. He has not yet been treated for H Pylori. 7/13/24 - Hgb 9.3, Dc'd home. 7/25/24 - CT Chest showed no evidence of metastatic disease.  Currently, Mr. GRAZA denies abdominal pain. He feels well today and no longer has early satiety but does continue to have symptoms of heart burn. He is tolerating a regular diet denies any changes to bowel movements or melena. He hasn't lost any more weight.   Functional Status: Mr. GARZA is able to walk up 2 flights of stairs without fatigue or dyspnea.

## 2024-07-31 ENCOUNTER — TRANSCRIPTION ENCOUNTER (OUTPATIENT)
Age: 49
End: 2024-07-31

## 2024-07-31 ENCOUNTER — RESULT REVIEW (OUTPATIENT)
Age: 49
End: 2024-07-31

## 2024-07-31 ENCOUNTER — NON-APPOINTMENT (OUTPATIENT)
Age: 49
End: 2024-07-31

## 2024-07-31 ENCOUNTER — APPOINTMENT (OUTPATIENT)
Dept: SURGICAL ONCOLOGY | Facility: HOSPITAL | Age: 49
End: 2024-07-31

## 2024-07-31 PROBLEM — C16.9 MALIGNANT NEOPLASM OF STOMACH, UNSPECIFIED: Chronic | Status: ACTIVE | Noted: 2024-07-30

## 2024-07-31 LAB
ABO + RH PNL BLD: NORMAL
ALBUMIN SERPL ELPH-MCNC: 4.2 G/DL
ALP BLD-CCNC: 63 U/L
ALT SERPL-CCNC: 9 U/L
ANION GAP SERPL CALC-SCNC: 12 MMOL/L
APTT BLD: 33 SEC
AST SERPL-CCNC: 13 U/L
BASOPHILS # BLD AUTO: 0.08 K/UL
BASOPHILS NFR BLD AUTO: 0.9 %
BILIRUB DIRECT SERPL-MCNC: 0.1 MG/DL
BILIRUB INDIRECT SERPL-MCNC: 0.3 MG/DL
BILIRUB SERPL-MCNC: 0.4 MG/DL
BUN SERPL-MCNC: 10 MG/DL
CALCIUM SERPL-MCNC: 9.5 MG/DL
CHLORIDE SERPL-SCNC: 104 MMOL/L
CO2 SERPL-SCNC: 26 MMOL/L
CREAT SERPL-MCNC: 0.81 MG/DL
EGFR: 107 ML/MIN/1.73M2
EOSINOPHIL # BLD AUTO: 0.08 K/UL
EOSINOPHIL NFR BLD AUTO: 0.9 %
GLUCOSE SERPL-MCNC: 108 MG/DL
HCT VFR BLD CALC: 34 %
HGB BLD-MCNC: 9.2 G/DL
INR PPP: 0.98 RATIO
LYMPHOCYTES # BLD AUTO: 1.4 K/UL
LYMPHOCYTES NFR BLD AUTO: 14.9 %
MAN DIFF?: NORMAL
MCHC RBC-ENTMCNC: 21.2 PG
MCHC RBC-ENTMCNC: 27.1 GM/DL
MCV RBC AUTO: 78.3 FL
MONOCYTES # BLD AUTO: 1.15 K/UL
MONOCYTES NFR BLD AUTO: 12.3 %
NEUTROPHILS # BLD AUTO: 6.65 K/UL
NEUTROPHILS NFR BLD AUTO: 71 %
PLATELET # BLD AUTO: 384 K/UL
POTASSIUM SERPL-SCNC: 4 MMOL/L
PROT SERPL-MCNC: 6.4 G/DL
PT BLD: 11.1 SEC
RBC # BLD: 4.34 M/UL
RBC # FLD: 26.3 %
SODIUM SERPL-SCNC: 142 MMOL/L
WBC # FLD AUTO: 9.37 K/UL

## 2024-08-01 PROBLEM — R11.2 CHEMOTHERAPY-INDUCED NAUSEA AND VOMITING: Status: ACTIVE | Noted: 2024-08-01

## 2024-08-01 PROBLEM — K52.1 CHEMOTHERAPY-INDUCED DIARRHEA: Status: ACTIVE | Noted: 2024-08-01

## 2024-08-01 RX ORDER — DEXAMETHASONE 4 MG/1
4 TABLET ORAL
Qty: 12 | Refills: 3 | Status: ACTIVE | COMMUNITY
Start: 2024-08-01 | End: 1900-01-01

## 2024-08-01 RX ORDER — ONDANSETRON 8 MG/1
8 TABLET, ORALLY DISINTEGRATING ORAL EVERY 8 HOURS
Qty: 60 | Refills: 4 | Status: ACTIVE | COMMUNITY
Start: 2024-08-01 | End: 1900-01-01

## 2024-08-02 ENCOUNTER — NON-APPOINTMENT (OUTPATIENT)
Age: 49
End: 2024-08-02

## 2024-08-02 ENCOUNTER — TRANSCRIPTION ENCOUNTER (OUTPATIENT)
Age: 49
End: 2024-08-02

## 2024-08-02 PROBLEM — Z87.898 PERSONAL HISTORY OF OTHER SPECIFIED CONDITIONS: Chronic | Status: ACTIVE | Noted: 2024-07-30

## 2024-08-06 ENCOUNTER — OUTPATIENT (OUTPATIENT)
Dept: OUTPATIENT SERVICES | Facility: HOSPITAL | Age: 49
LOS: 1 days | End: 2024-08-06
Payer: COMMERCIAL

## 2024-08-06 ENCOUNTER — APPOINTMENT (OUTPATIENT)
Dept: INFUSION THERAPY | Facility: CLINIC | Age: 49
End: 2024-08-06

## 2024-08-06 VITALS
OXYGEN SATURATION: 100 % | RESPIRATION RATE: 18 BRPM | TEMPERATURE: 99 F | HEART RATE: 105 BPM | WEIGHT: 139.99 LBS | HEIGHT: 67 IN | SYSTOLIC BLOOD PRESSURE: 153 MMHG | DIASTOLIC BLOOD PRESSURE: 84 MMHG

## 2024-08-06 VITALS
TEMPERATURE: 99 F | DIASTOLIC BLOOD PRESSURE: 81 MMHG | SYSTOLIC BLOOD PRESSURE: 144 MMHG | RESPIRATION RATE: 18 BRPM | OXYGEN SATURATION: 99 % | HEART RATE: 81 BPM

## 2024-08-06 DIAGNOSIS — Z98.890 OTHER SPECIFIED POSTPROCEDURAL STATES: Chronic | ICD-10-CM

## 2024-08-06 DIAGNOSIS — D50.9 IRON DEFICIENCY ANEMIA, UNSPECIFIED: ICD-10-CM

## 2024-08-06 PROCEDURE — 96365 THER/PROPH/DIAG IV INF INIT: CPT

## 2024-08-06 PROCEDURE — 96375 TX/PRO/DX INJ NEW DRUG ADDON: CPT

## 2024-08-06 RX ORDER — IRON DEXTRAN 50 MG/ML
25 INJECTION INTRAMUSCULAR; INTRAVENOUS ONCE
Refills: 0 | Status: COMPLETED | OUTPATIENT
Start: 2024-08-06 | End: 2024-08-06

## 2024-08-06 RX ORDER — LOPERAMIDE HYDROCHLORIDE 2 MG/1
2 CAPSULE ORAL
Qty: 80 | Refills: 4 | Status: ACTIVE | COMMUNITY
Start: 2024-08-01 | End: 1900-01-01

## 2024-08-06 RX ORDER — IRON DEXTRAN 50 MG/ML
975 INJECTION INTRAMUSCULAR; INTRAVENOUS ONCE
Refills: 0 | Status: COMPLETED | OUTPATIENT
Start: 2024-08-06 | End: 2024-08-06

## 2024-08-06 RX ADMIN — IRON DEXTRAN 202 MILLIGRAM(S): 50 INJECTION INTRAMUSCULAR; INTRAVENOUS at 16:11

## 2024-08-06 RX ADMIN — IRON DEXTRAN 269.5 MILLIGRAM(S): 50 INJECTION INTRAMUSCULAR; INTRAVENOUS at 17:28

## 2024-08-06 RX ADMIN — IRON DEXTRAN 25 MILLIGRAM(S): 50 INJECTION INTRAMUSCULAR; INTRAVENOUS at 16:26

## 2024-08-08 ENCOUNTER — RESULT REVIEW (OUTPATIENT)
Age: 49
End: 2024-08-08

## 2024-08-08 ENCOUNTER — APPOINTMENT (OUTPATIENT)
Dept: INTERVENTIONAL RADIOLOGY/VASCULAR | Facility: HOSPITAL | Age: 49
End: 2024-08-08

## 2024-08-08 ENCOUNTER — OUTPATIENT (OUTPATIENT)
Dept: OUTPATIENT SERVICES | Facility: HOSPITAL | Age: 49
LOS: 1 days | End: 2024-08-08
Payer: COMMERCIAL

## 2024-08-08 DIAGNOSIS — Z98.890 OTHER SPECIFIED POSTPROCEDURAL STATES: Chronic | ICD-10-CM

## 2024-08-08 PROCEDURE — C1769: CPT

## 2024-08-08 PROCEDURE — 36561 INSERT TUNNELED CV CATH: CPT | Mod: RT

## 2024-08-08 PROCEDURE — 77001 FLUOROGUIDE FOR VEIN DEVICE: CPT

## 2024-08-08 PROCEDURE — 99152 MOD SED SAME PHYS/QHP 5/>YRS: CPT

## 2024-08-08 PROCEDURE — 36561 INSERT TUNNELED CV CATH: CPT

## 2024-08-08 PROCEDURE — 76937 US GUIDE VASCULAR ACCESS: CPT

## 2024-08-08 PROCEDURE — 77001 FLUOROGUIDE FOR VEIN DEVICE: CPT | Mod: 26

## 2024-08-08 PROCEDURE — 76937 US GUIDE VASCULAR ACCESS: CPT | Mod: 26

## 2024-08-08 PROCEDURE — C1788: CPT

## 2024-08-08 PROCEDURE — 99153 MOD SED SAME PHYS/QHP EA: CPT

## 2024-08-12 ENCOUNTER — NON-APPOINTMENT (OUTPATIENT)
Age: 49
End: 2024-08-12

## 2024-08-13 ENCOUNTER — NON-APPOINTMENT (OUTPATIENT)
Age: 49
End: 2024-08-13

## 2024-08-13 ENCOUNTER — APPOINTMENT (OUTPATIENT)
Dept: INFUSION THERAPY | Facility: CLINIC | Age: 49
End: 2024-08-13

## 2024-08-13 RX ORDER — LEUCOVORIN CALCIUM 10 MG/ML
340 INJECTION INTRAMUSCULAR; INTRAVENOUS ONCE
Refills: 0 | Status: COMPLETED | OUTPATIENT
Start: 2024-08-14 | End: 2024-08-14

## 2024-08-13 RX ORDER — FOSAPREPITANT DIMEGLUMINE 150 MG/5ML
150 INJECTION, POWDER, LYOPHILIZED, FOR SOLUTION INTRAVENOUS ONCE
Refills: 0 | Status: COMPLETED | OUTPATIENT
Start: 2024-08-14 | End: 2024-08-14

## 2024-08-13 RX ORDER — PALONOSETRON HYDROCHLORIDE 0.25 MG/5ML
0.25 INJECTION, SOLUTION INTRAVENOUS ONCE
Refills: 0 | Status: COMPLETED | OUTPATIENT
Start: 2024-08-14 | End: 2024-08-14

## 2024-08-13 RX ORDER — LIDOCAINE 5% 5 G/100G
1 CREAM TOPICAL ONCE
Refills: 0 | Status: COMPLETED | OUTPATIENT
Start: 2024-08-14 | End: 2024-08-14

## 2024-08-14 ENCOUNTER — APPOINTMENT (OUTPATIENT)
Dept: INFUSION THERAPY | Facility: CLINIC | Age: 49
End: 2024-08-14

## 2024-08-14 ENCOUNTER — APPOINTMENT (OUTPATIENT)
Dept: HEMATOLOGY ONCOLOGY | Facility: CLINIC | Age: 49
End: 2024-08-14
Payer: COMMERCIAL

## 2024-08-14 ENCOUNTER — OUTPATIENT (OUTPATIENT)
Dept: OUTPATIENT SERVICES | Facility: HOSPITAL | Age: 49
LOS: 1 days | End: 2024-08-14
Payer: COMMERCIAL

## 2024-08-14 ENCOUNTER — APPOINTMENT (OUTPATIENT)
Dept: INTERVENTIONAL RADIOLOGY/VASCULAR | Facility: HOSPITAL | Age: 49
End: 2024-08-14

## 2024-08-14 VITALS
HEART RATE: 82 BPM | SYSTOLIC BLOOD PRESSURE: 135 MMHG | HEIGHT: 67 IN | DIASTOLIC BLOOD PRESSURE: 80 MMHG | TEMPERATURE: 98.2 F | OXYGEN SATURATION: 100 % | BODY MASS INDEX: 21.35 KG/M2 | WEIGHT: 136 LBS | RESPIRATION RATE: 18 BRPM

## 2024-08-14 DIAGNOSIS — C16.9 MALIGNANT NEOPLASM OF STOMACH, UNSPECIFIED: ICD-10-CM

## 2024-08-14 DIAGNOSIS — Z98.890 OTHER SPECIFIED POSTPROCEDURAL STATES: Chronic | ICD-10-CM

## 2024-08-14 DIAGNOSIS — D50.0 IRON DEFICIENCY ANEMIA SECONDARY TO BLOOD LOSS (CHRONIC): ICD-10-CM

## 2024-08-14 DIAGNOSIS — A04.8 OTHER SPECIFIED BACTERIAL INTESTINAL INFECTIONS: ICD-10-CM

## 2024-08-14 DIAGNOSIS — R06.6 HICCOUGH: ICD-10-CM

## 2024-08-14 PROBLEM — Z87.898 PERSONAL HISTORY OF OTHER SPECIFIED CONDITIONS: Chronic | Status: ACTIVE | Noted: 2024-07-30

## 2024-08-14 PROCEDURE — 99215 OFFICE O/P EST HI 40 MIN: CPT

## 2024-08-14 PROCEDURE — 36415 COLL VENOUS BLD VENIPUNCTURE: CPT

## 2024-08-14 RX ORDER — DEXAMETHASONE 1.5 MG/1
10 TABLET ORAL ONCE
Refills: 0 | Status: DISCONTINUED | OUTPATIENT
Start: 2024-08-14 | End: 2024-08-14

## 2024-08-14 RX ORDER — FLUOROURACIL 50 MG/ML
4400 INJECTION, SOLUTION INTRAVENOUS ONCE
Refills: 0 | Status: COMPLETED | OUTPATIENT
Start: 2024-08-14 | End: 2024-08-14

## 2024-08-14 RX ORDER — OXALIPLATIN 5 MG/ML
145 INJECTION, SOLUTION INTRAVENOUS ONCE
Refills: 0 | Status: COMPLETED | OUTPATIENT
Start: 2024-08-14 | End: 2024-08-14

## 2024-08-14 RX ORDER — DOCETAXEL 20 MG/ML
85 INJECTION, SOLUTION, CONCENTRATE INTRAVENOUS ONCE
Refills: 0 | Status: COMPLETED | OUTPATIENT
Start: 2024-08-14 | End: 2024-08-14

## 2024-08-14 RX ADMIN — DOCETAXEL 85 MILLIGRAM(S): 20 INJECTION, SOLUTION, CONCENTRATE INTRAVENOUS at 14:00

## 2024-08-14 RX ADMIN — FLUOROURACIL 4400 MILLIGRAM(S): 50 INJECTION, SOLUTION INTRAVENOUS at 16:55

## 2024-08-14 RX ADMIN — LEUCOVORIN CALCIUM 340 MILLIGRAM(S): 10 INJECTION INTRAMUSCULAR; INTRAVENOUS at 14:45

## 2024-08-14 RX ADMIN — FOSAPREPITANT DIMEGLUMINE 500 MILLIGRAM(S): 150 INJECTION, POWDER, LYOPHILIZED, FOR SOLUTION INTRAVENOUS at 11:00

## 2024-08-14 RX ADMIN — OXALIPLATIN 145 MILLIGRAM(S): 5 INJECTION, SOLUTION INTRAVENOUS at 16:45

## 2024-08-14 RX ADMIN — DOCETAXEL 85 MILLIGRAM(S): 20 INJECTION, SOLUTION, CONCENTRATE INTRAVENOUS at 12:36

## 2024-08-14 RX ADMIN — PALONOSETRON HYDROCHLORIDE 0.25 MILLIGRAM(S): 0.25 INJECTION, SOLUTION INTRAVENOUS at 11:45

## 2024-08-14 RX ADMIN — FOSAPREPITANT DIMEGLUMINE 150 MILLIGRAM(S): 150 INJECTION, POWDER, LYOPHILIZED, FOR SOLUTION INTRAVENOUS at 11:30

## 2024-08-14 RX ADMIN — LEUCOVORIN CALCIUM 340 MILLIGRAM(S): 10 INJECTION INTRAMUSCULAR; INTRAVENOUS at 16:45

## 2024-08-14 RX ADMIN — OXALIPLATIN 145 MILLIGRAM(S): 5 INJECTION, SOLUTION INTRAVENOUS at 14:40

## 2024-08-15 ENCOUNTER — APPOINTMENT (OUTPATIENT)
Dept: INFUSION THERAPY | Facility: CLINIC | Age: 49
End: 2024-08-15

## 2024-08-15 LAB
ALBUMIN SERPL ELPH-MCNC: 3.9 G/DL
ALP BLD-CCNC: 50 U/L
ALT SERPL-CCNC: 20 U/L
ANION GAP SERPL CALC-SCNC: 13 MMOL/L
AST SERPL-CCNC: 22 U/L
BILIRUB SERPL-MCNC: 0.6 MG/DL
BUN SERPL-MCNC: 15 MG/DL
CALCIUM SERPL-MCNC: 10.1 MG/DL
CHLORIDE SERPL-SCNC: 103 MMOL/L
CO2 SERPL-SCNC: 27 MMOL/L
CREAT SERPL-MCNC: 0.7 MG/DL
EGFR: 112 ML/MIN/1.73M2
GLUCOSE SERPL-MCNC: 223 MG/DL
HCT VFR BLD CALC: 35.7 %
HGB BLD-MCNC: 10.6 G/DL
LYMPHOCYTES # BLD AUTO: 0.4 K/UL
LYMPHOCYTES NFR BLD AUTO: 2.4 %
MAN DIFF?: NO
MCHC RBC-ENTMCNC: 23.3 PG
MCHC RBC-ENTMCNC: 29.7 GM/DL
MCV RBC AUTO: 78.5 FL
NEUTROPHILS # BLD AUTO: 17.1 K/UL
NEUTROPHILS NFR BLD AUTO: 94.5 %
PLATELET # BLD AUTO: 421 K/UL
POTASSIUM SERPL-SCNC: 5 MMOL/L
PROT SERPL-MCNC: 7.3 G/DL
RBC # BLD: 4.55 M/UL
RBC # FLD: 28.6 %
SODIUM SERPL-SCNC: 143 MMOL/L
WBC # FLD AUTO: 18.1 K/UL

## 2024-08-15 RX ADMIN — FLUOROURACIL 4400 MILLIGRAM(S): 50 INJECTION, SOLUTION INTRAVENOUS at 17:08

## 2024-08-19 NOTE — END OF VISIT
[] : Fellow [FreeTextEntry3] :  I evaluated the patient with the Oncology fellow, Dr Ontiveros.  the patient presents to initiate neoadjuvant chemotherapy with FLOT.  Reviewed rationale for treatment, cancer staging, prognosis.  Discussed possible risks, side effects of chemotherapy at length.  informed consent for treatment obtained.  RTC in 2 weeks for C2 of FLOT.  Trend CBC for OSIRIS and repeat iron studies w/ C4.  Complete antibiotics for H pylori.

## 2024-08-19 NOTE — PHYSICAL EXAM
[Restricted in physically strenuous activity but ambulatory and able to carry out work of a light or sedentary nature] : Status 1- Restricted in physically strenuous activity but ambulatory and able to carry out work of a light or sedentary nature, e.g., light house work, office work [Normal] : full range of motion and no deformities appreciated [de-identified] : Laparoscopy incision sites well healed [de-identified] : No cervical or supraclavicular lymphadenopathy noted [de-identified] : 5/5 strength in UE/LE [de-identified] : Right chest wall port site CDI, no surrounding tenderness or signs of infection [de-identified] : Cranial nerves grossly intact.

## 2024-08-19 NOTE — REASON FOR VISIT
[Initial Consultation] : an initial consultation [Spouse] : spouse [Follow-Up Visit] : a follow-up [FreeTextEntry2] : initiate chemotherapy for gastric adenocarcinoma

## 2024-08-19 NOTE — HISTORY OF PRESENT ILLNESS
[de-identified] : Isrrael Cortez is a 49 year old male here for follow up of gastric cancer.   Oncology history: 1.  gastric adenocarcinoma -presented to [facility] ER on 7/11/24 with severe symptomatic anemia, epigastric pain, and weight loss of 20lbs over 3 months.  s/p 3 units of PRBC during admission.  labs confirmed iron deficiency -CT AP on7/11/24 showed mural thickening at the gastric antrum may represent gastritis or gastric cancer.  0.9 cm sclerotic focus in the left iliac bone may represent a bone island.  -s/p EGD with a biopsy on 7/12/24, notable for large ulcerated mass on lesser curvature of stomach, c/w malignancy. No e/o active bleeding.  Biopsy of the gastric mass confirmed adenocarcinoma, moderately differentiated in background of ulceration and Helicobacter pylori associated gastritis and intestinal metaplasia, MMR intact, HER2/khang:  1+ score, negative.  PD-L1/CPS : 4 -CT chest on 7/25/24 (at [facility] due to insurance reasons) : no evidence of metastatic disease in chest  -Diagnostic laparoscopy (Dr. Carrasco) on 7/31/24, notable for moderately differentiated gastric adenoCA of lesser curvature, clinically T2-T3 disease. Peritoneal washings with rare groups of atypical, degenerated cells - neoadjuvant FLOT chemotherapy 8/14/24 -  PMH: HTN (Dx in 12/2023, used to take Losartan 50mg)  PSH:  none  FH: aunt with breast ca SH: nonsmoker, no drug/alcohol,  at high school,  (Wife Cora) with 2 teenagers, lives in [place] [de-identified] : Presents for C1D1 of FLOT regimen. Accompanied by wife, Cora. In interim, underwent diagnostic laparoscopy (7/31) showing clinical T2-T3 mod differentiated gastric adenoCA of lesser curvature. Port was placed on 8/8. Received 1g Infed IV iron. States feeling well today, offers no complaints. Abdominal pain resolved on triple therapy for H. pylori, appetite is good. Has 2-3 days left of medications. Energy is much improved, able to walk several blocks and climb multiple flights of stairs. No s/s of bleeding.

## 2024-08-19 NOTE — PHYSICAL EXAM
[Restricted in physically strenuous activity but ambulatory and able to carry out work of a light or sedentary nature] : Status 1- Restricted in physically strenuous activity but ambulatory and able to carry out work of a light or sedentary nature, e.g., light house work, office work [Normal] : full range of motion and no deformities appreciated [de-identified] : Laparoscopy incision sites well healed [de-identified] : No cervical or supraclavicular lymphadenopathy noted [de-identified] : 5/5 strength in UE/LE [de-identified] : Right chest wall port site CDI, no surrounding tenderness or signs of infection [de-identified] : Cranial nerves grossly intact.

## 2024-08-19 NOTE — HISTORY OF PRESENT ILLNESS
[de-identified] : Isrrael Cortez is a 49 year old male here for follow up of gastric cancer.   Oncology history: 1.  gastric adenocarcinoma -presented to [facility] ER on 7/11/24 with severe symptomatic anemia, epigastric pain, and weight loss of 20lbs over 3 months.  s/p 3 units of PRBC during admission.  labs confirmed iron deficiency -CT AP on7/11/24 showed mural thickening at the gastric antrum may represent gastritis or gastric cancer.  0.9 cm sclerotic focus in the left iliac bone may represent a bone island.  -s/p EGD with a biopsy on 7/12/24, notable for large ulcerated mass on lesser curvature of stomach, c/w malignancy. No e/o active bleeding.  Biopsy of the gastric mass confirmed adenocarcinoma, moderately differentiated in background of ulceration and Helicobacter pylori associated gastritis and intestinal metaplasia, MMR intact, HER2/khang:  1+ score, negative.  PD-L1/CPS : 4 -CT chest on 7/25/24 (at [facility] due to insurance reasons) : no evidence of metastatic disease in chest  -Diagnostic laparoscopy (Dr. Carrasco) on 7/31/24, notable for moderately differentiated gastric adenoCA of lesser curvature, clinically T2-T3 disease. Peritoneal washings with rare groups of atypical, degenerated cells - neoadjuvant FLOT chemotherapy 8/14/24 -  PMH: HTN (Dx in 12/2023, used to take Losartan 50mg)  PSH:  none  FH: aunt with breast ca SH: nonsmoker, no drug/alcohol,  at high school,  (Wife Cora) with 2 teenagers, lives in [place] [de-identified] : Presents for C1D1 of FLOT regimen. Accompanied by wife, Cora. In interim, underwent diagnostic laparoscopy (7/31) showing clinical T2-T3 mod differentiated gastric adenoCA of lesser curvature. Port was placed on 8/8. Received 1g Infed IV iron. States feeling well today, offers no complaints. Abdominal pain resolved on triple therapy for H. pylori, appetite is good. Has 2-3 days left of medications. Energy is much improved, able to walk several blocks and climb multiple flights of stairs. No s/s of bleeding.

## 2024-08-19 NOTE — ASSESSMENT
[FreeTextEntry1] : Isrrael Cortez is a 49 year old man who presented with severe iron deficiency anemia. CT scan showed thickening of the gastric antrum concerning for malignancy.  EGD showed an ulcerated mass along the lesser curvature of the stomach; biopsy confirmed moderately differentiated adenocarcinoma, with H pylori organisms present, MMR intact by IHC. CT of the chest did not demonstrate any thoracic metastatic disease. Diagnostic laparoscopy on 7/31/24 showing clinical T2-T3 disease. Peritoneal washings with rare atypical cells.  Plan: #  gastric cancer --diagnosis, imaging and pathology results reviewed w/ the patient and his wife. --underwent diagnostic laparoscopy (Dr. Carrasco) on 7/31/24, notable for moderately differentiated gastric adenoCA of lesser curvature, clinically T2-T3 disease. Peritoneal washings with rare groups of atypical, degenerated cells.  His case was reviewed in multidisciplinary fashion (in discussion w/ surgical oncologist Dr Carrasco as well as in tumor board).  Neoadjuvant chemotherapy with FLOT regimen followed by surgery was recommended. Following surgical resection of the cancer, plan for adjuvant chemotherapy. --prognosis discussed - treatment is being delivered with Curative intent. --potential adverse effects reviewed, questions answered. Consent to initiate chemotherapy was signed --C1D1 of docetaxel, oxaliplatin, leucovorin, FU today (q14 days x4 cycles) --dexamethasone 4mg (2 tabs BID) day before, during, after chemotherapy --ECG, given concurrent clarithromycin. QTcF 385 today.  # H pylori infection --completing triple therapy (PPI BID + amoxicillin 1 g BID + clarithromycin 500 mg po BID), few days remaining --continue PPI daily thereafter  #  weight loss unintentional --dietician consulted --appetite improved following treatment of h. pylori, trending weight  #  iron deficiency anemia --Labs from hospitalization 7/2024 confirmed iron deficiency --received 1g infed on 8/6/24, hemoglobin up to 10.6 today (prev 9.2) --will reassess iron closer to C4  Pump disconnect tomorrow RTC 2 weeks CBC, CMP, Mag  Please see attending physician attestation below.

## 2024-08-19 NOTE — PHYSICAL EXAM
[Restricted in physically strenuous activity but ambulatory and able to carry out work of a light or sedentary nature] : Status 1- Restricted in physically strenuous activity but ambulatory and able to carry out work of a light or sedentary nature, e.g., light house work, office work [Normal] : full range of motion and no deformities appreciated [de-identified] : Laparoscopy incision sites well healed [de-identified] : No cervical or supraclavicular lymphadenopathy noted [de-identified] : 5/5 strength in UE/LE [de-identified] : Right chest wall port site CDI, no surrounding tenderness or signs of infection [de-identified] : Cranial nerves grossly intact.

## 2024-08-19 NOTE — HISTORY OF PRESENT ILLNESS
[de-identified] : Isrrael Cortez is a 49 year old male here for follow up of gastric cancer.   Oncology history: 1.  gastric adenocarcinoma -presented to [facility] ER on 7/11/24 with severe symptomatic anemia, epigastric pain, and weight loss of 20lbs over 3 months.  s/p 3 units of PRBC during admission.  labs confirmed iron deficiency -CT AP on7/11/24 showed mural thickening at the gastric antrum may represent gastritis or gastric cancer.  0.9 cm sclerotic focus in the left iliac bone may represent a bone island.  -s/p EGD with a biopsy on 7/12/24, notable for large ulcerated mass on lesser curvature of stomach, c/w malignancy. No e/o active bleeding.  Biopsy of the gastric mass confirmed adenocarcinoma, moderately differentiated in background of ulceration and Helicobacter pylori associated gastritis and intestinal metaplasia, MMR intact, HER2/khang:  1+ score, negative.  PD-L1/CPS : 4 -CT chest on 7/25/24 (at [facility] due to insurance reasons) : no evidence of metastatic disease in chest  -Diagnostic laparoscopy (Dr. Carrasco) on 7/31/24, notable for moderately differentiated gastric adenoCA of lesser curvature, clinically T2-T3 disease. Peritoneal washings with rare groups of atypical, degenerated cells - neoadjuvant FLOT chemotherapy 8/14/24 -  PMH: HTN (Dx in 12/2023, used to take Losartan 50mg)  PSH:  none  FH: aunt with breast ca SH: nonsmoker, no drug/alcohol,  at high school,  (Wife Cora) with 2 teenagers, lives in [place] [de-identified] : Presents for C1D1 of FLOT regimen. Accompanied by wife, Cora. In interim, underwent diagnostic laparoscopy (7/31) showing clinical T2-T3 mod differentiated gastric adenoCA of lesser curvature. Port was placed on 8/8. Received 1g Infed IV iron. States feeling well today, offers no complaints. Abdominal pain resolved on triple therapy for H. pylori, appetite is good. Has 2-3 days left of medications. Energy is much improved, able to walk several blocks and climb multiple flights of stairs. No s/s of bleeding.

## 2024-08-20 ENCOUNTER — NON-APPOINTMENT (OUTPATIENT)
Age: 49
End: 2024-08-20

## 2024-08-20 PROBLEM — R06.6 HICCUP: Status: ACTIVE | Noted: 2024-08-20

## 2024-08-20 RX ORDER — METOCLOPRAMIDE 10 MG/1
10 TABLET ORAL
Qty: 30 | Refills: 2 | Status: ACTIVE | COMMUNITY
Start: 2024-08-20 | End: 1900-01-01

## 2024-08-21 ENCOUNTER — INPATIENT (INPATIENT)
Facility: HOSPITAL | Age: 49
LOS: 4 days | Discharge: HOME | End: 2024-08-26
Attending: STUDENT IN AN ORGANIZED HEALTH CARE EDUCATION/TRAINING PROGRAM | Admitting: STUDENT IN AN ORGANIZED HEALTH CARE EDUCATION/TRAINING PROGRAM
Payer: COMMERCIAL

## 2024-08-21 ENCOUNTER — NON-APPOINTMENT (OUTPATIENT)
Age: 49
End: 2024-08-21

## 2024-08-21 VITALS
DIASTOLIC BLOOD PRESSURE: 55 MMHG | RESPIRATION RATE: 17 BRPM | WEIGHT: 138.01 LBS | HEART RATE: 126 BPM | SYSTOLIC BLOOD PRESSURE: 84 MMHG | TEMPERATURE: 98 F | OXYGEN SATURATION: 99 % | HEIGHT: 67 IN

## 2024-08-21 DIAGNOSIS — K92.0 HEMATEMESIS: ICD-10-CM

## 2024-08-21 DIAGNOSIS — C16.9 MALIGNANT NEOPLASM OF STOMACH, UNSPECIFIED: ICD-10-CM

## 2024-08-21 DIAGNOSIS — R73.03 PREDIABETES: ICD-10-CM

## 2024-08-21 DIAGNOSIS — R55 SYNCOPE AND COLLAPSE: ICD-10-CM

## 2024-08-21 DIAGNOSIS — D72.829 ELEVATED WHITE BLOOD CELL COUNT, UNSPECIFIED: ICD-10-CM

## 2024-08-21 DIAGNOSIS — I10 ESSENTIAL (PRIMARY) HYPERTENSION: ICD-10-CM

## 2024-08-21 DIAGNOSIS — Z98.890 OTHER SPECIFIED POSTPROCEDURAL STATES: Chronic | ICD-10-CM

## 2024-08-21 DIAGNOSIS — D64.9 ANEMIA, UNSPECIFIED: ICD-10-CM

## 2024-08-21 DIAGNOSIS — E87.20 ACIDOSIS, UNSPECIFIED: ICD-10-CM

## 2024-08-21 LAB
ALBUMIN SERPL ELPH-MCNC: 3.2 G/DL — LOW (ref 3.3–5)
ALP SERPL-CCNC: 87 U/L — SIGNIFICANT CHANGE UP (ref 40–120)
ALT FLD-CCNC: 23 U/L — SIGNIFICANT CHANGE UP (ref 12–78)
AMYLASE P1 CFR SERPL: 61 U/L — SIGNIFICANT CHANGE UP (ref 25–115)
ANION GAP SERPL CALC-SCNC: 9 MMOL/L — SIGNIFICANT CHANGE UP (ref 5–17)
APTT BLD: 26.1 SEC — SIGNIFICANT CHANGE UP (ref 24.5–35.6)
AST SERPL-CCNC: 13 U/L — LOW (ref 15–37)
BASOPHILS # BLD AUTO: 0 K/UL — SIGNIFICANT CHANGE UP (ref 0–0.2)
BASOPHILS NFR BLD AUTO: 0 % — SIGNIFICANT CHANGE UP (ref 0–2)
BILIRUB SERPL-MCNC: 0.4 MG/DL — SIGNIFICANT CHANGE UP (ref 0.2–1.2)
BLD GP AB SCN SERPL QL: SIGNIFICANT CHANGE UP
BUN SERPL-MCNC: 31 MG/DL — HIGH (ref 7–23)
CALCIUM SERPL-MCNC: 10.3 MG/DL — HIGH (ref 8.5–10.1)
CHLORIDE SERPL-SCNC: 96 MMOL/L — SIGNIFICANT CHANGE UP (ref 96–108)
CO2 SERPL-SCNC: 28 MMOL/L — SIGNIFICANT CHANGE UP (ref 22–31)
CREAT SERPL-MCNC: 1.27 MG/DL — SIGNIFICANT CHANGE UP (ref 0.5–1.3)
EGFR: 68 ML/MIN/1.73M2 — SIGNIFICANT CHANGE UP
EOSINOPHIL # BLD AUTO: 0 K/UL — SIGNIFICANT CHANGE UP (ref 0–0.5)
EOSINOPHIL NFR BLD AUTO: 0 % — SIGNIFICANT CHANGE UP (ref 0–6)
GLUCOSE SERPL-MCNC: 286 MG/DL — HIGH (ref 70–99)
HCT VFR BLD CALC: 24.2 % — LOW (ref 39–50)
HCT VFR BLD CALC: 35.7 % — LOW (ref 39–50)
HGB BLD-MCNC: 10.9 G/DL — LOW (ref 13–17)
HGB BLD-MCNC: 7.5 G/DL — LOW (ref 13–17)
INR BLD: 1.08 RATIO — SIGNIFICANT CHANGE UP (ref 0.85–1.18)
LACTATE SERPL-SCNC: 1.6 MMOL/L — SIGNIFICANT CHANGE UP (ref 0.7–2)
LACTATE SERPL-SCNC: 2.8 MMOL/L — HIGH (ref 0.7–2)
LIDOCAIN IGE QN: 46 U/L — SIGNIFICANT CHANGE UP (ref 13–75)
LYMPHOCYTES # BLD AUTO: 2.19 K/UL — SIGNIFICANT CHANGE UP (ref 1–3.3)
LYMPHOCYTES # BLD AUTO: 9 % — LOW (ref 13–44)
MACROCYTES BLD QL: SLIGHT — SIGNIFICANT CHANGE UP
MANUAL SMEAR VERIFICATION: SIGNIFICANT CHANGE UP
MCHC RBC-ENTMCNC: 24.1 PG — LOW (ref 27–34)
MCHC RBC-ENTMCNC: 24.1 PG — LOW (ref 27–34)
MCHC RBC-ENTMCNC: 30.5 G/DL — LOW (ref 32–36)
MCHC RBC-ENTMCNC: 31 G/DL — LOW (ref 32–36)
MCV RBC AUTO: 77.8 FL — LOW (ref 80–100)
MCV RBC AUTO: 78.8 FL — LOW (ref 80–100)
MONOCYTES # BLD AUTO: 2.68 K/UL — HIGH (ref 0–0.9)
MONOCYTES NFR BLD AUTO: 11 % — SIGNIFICANT CHANGE UP (ref 2–14)
MYELOCYTES NFR BLD: 7 % — HIGH (ref 0–0)
NEUTROPHILS # BLD AUTO: 17.27 K/UL — HIGH (ref 1.8–7.4)
NEUTROPHILS NFR BLD AUTO: 63 % — SIGNIFICANT CHANGE UP (ref 43–77)
NEUTS BAND # BLD: 8 % — SIGNIFICANT CHANGE UP (ref 0–8)
NRBC # BLD: 0 /100 WBCS — SIGNIFICANT CHANGE UP (ref 0–0)
NRBC # BLD: 0 /100 WBCS — SIGNIFICANT CHANGE UP (ref 0–0)
NRBC # BLD: SIGNIFICANT CHANGE UP /100 WBCS (ref 0–0)
PLAT MORPH BLD: NORMAL — SIGNIFICANT CHANGE UP
PLATELET # BLD AUTO: 158 K/UL — SIGNIFICANT CHANGE UP (ref 150–400)
PLATELET # BLD AUTO: 176 K/UL — SIGNIFICANT CHANGE UP (ref 150–400)
POTASSIUM SERPL-MCNC: 4.8 MMOL/L — SIGNIFICANT CHANGE UP (ref 3.5–5.3)
POTASSIUM SERPL-SCNC: 4.8 MMOL/L — SIGNIFICANT CHANGE UP (ref 3.5–5.3)
PROT SERPL-MCNC: 6 GM/DL — SIGNIFICANT CHANGE UP (ref 6–8.3)
PROTHROM AB SERPL-ACNC: 12.9 SEC — SIGNIFICANT CHANGE UP (ref 9.5–13)
RBC # BLD: 3.11 M/UL — LOW (ref 4.2–5.8)
RBC # BLD: 4.53 M/UL — SIGNIFICANT CHANGE UP (ref 4.2–5.8)
RBC # FLD: 29.2 % — HIGH (ref 10.3–14.5)
RBC # FLD: 29.3 % — HIGH (ref 10.3–14.5)
RBC BLD AUTO: SIGNIFICANT CHANGE UP
SMUDGE CELLS # BLD: PRESENT — SIGNIFICANT CHANGE UP
SODIUM SERPL-SCNC: 133 MMOL/L — LOW (ref 135–145)
VARIANT LYMPHS # BLD: 2 % — SIGNIFICANT CHANGE UP (ref 0–6)
WBC # BLD: 20.05 K/UL — HIGH (ref 3.8–10.5)
WBC # BLD: 24.33 K/UL — HIGH (ref 3.8–10.5)
WBC # FLD AUTO: 20.05 K/UL — HIGH (ref 3.8–10.5)
WBC # FLD AUTO: 24.33 K/UL — HIGH (ref 3.8–10.5)

## 2024-08-21 PROCEDURE — 71045 X-RAY EXAM CHEST 1 VIEW: CPT | Mod: 26

## 2024-08-21 PROCEDURE — 93010 ELECTROCARDIOGRAM REPORT: CPT

## 2024-08-21 PROCEDURE — 74174 CTA ABD&PLVS W/CONTRAST: CPT | Mod: 26,MC

## 2024-08-21 PROCEDURE — 70450 CT HEAD/BRAIN W/O DYE: CPT | Mod: 26,MC

## 2024-08-21 PROCEDURE — 99223 1ST HOSP IP/OBS HIGH 75: CPT

## 2024-08-21 PROCEDURE — 99285 EMERGENCY DEPT VISIT HI MDM: CPT

## 2024-08-21 RX ORDER — DEXTROSE 15 G/33 G
15 GEL IN PACKET (GRAM) ORAL ONCE
Refills: 0 | Status: DISCONTINUED | OUTPATIENT
Start: 2024-08-21 | End: 2024-08-26

## 2024-08-21 RX ORDER — ONDANSETRON 2 MG/ML
4 INJECTION, SOLUTION INTRAMUSCULAR; INTRAVENOUS EVERY 8 HOURS
Refills: 0 | Status: DISCONTINUED | OUTPATIENT
Start: 2024-08-21 | End: 2024-08-26

## 2024-08-21 RX ORDER — ACETAMINOPHEN 325 MG/1
650 TABLET ORAL EVERY 6 HOURS
Refills: 0 | Status: DISCONTINUED | OUTPATIENT
Start: 2024-08-21 | End: 2024-08-26

## 2024-08-21 RX ORDER — DEXTROSE 15 G/33 G
12.5 GEL IN PACKET (GRAM) ORAL ONCE
Refills: 0 | Status: DISCONTINUED | OUTPATIENT
Start: 2024-08-21 | End: 2024-08-26

## 2024-08-21 RX ORDER — PANTOPRAZOLE SODIUM 40 MG
8 TABLET, DELAYED RELEASE (ENTERIC COATED) ORAL
Qty: 80 | Refills: 0 | Status: DISCONTINUED | OUTPATIENT
Start: 2024-08-21 | End: 2024-08-26

## 2024-08-21 RX ORDER — PANTOPRAZOLE SODIUM 40 MG
80 TABLET, DELAYED RELEASE (ENTERIC COATED) ORAL ONCE
Refills: 0 | Status: COMPLETED | OUTPATIENT
Start: 2024-08-21 | End: 2024-08-21

## 2024-08-21 RX ORDER — DEXTROSE 15 G/33 G
25 GEL IN PACKET (GRAM) ORAL ONCE
Refills: 0 | Status: DISCONTINUED | OUTPATIENT
Start: 2024-08-21 | End: 2024-08-26

## 2024-08-21 RX ORDER — ONDANSETRON 2 MG/ML
4 INJECTION, SOLUTION INTRAMUSCULAR; INTRAVENOUS ONCE
Refills: 0 | Status: COMPLETED | OUTPATIENT
Start: 2024-08-21 | End: 2024-08-21

## 2024-08-21 RX ORDER — GLUCAGON INJECTION, SOLUTION 1 MG/.2ML
1 INJECTION, SOLUTION SUBCUTANEOUS ONCE
Refills: 0 | Status: DISCONTINUED | OUTPATIENT
Start: 2024-08-21 | End: 2024-08-26

## 2024-08-21 RX ORDER — SUCRALFATE 1 G/10ML
1 SUSPENSION ORAL
Refills: 0 | Status: DISCONTINUED | OUTPATIENT
Start: 2024-08-21 | End: 2024-08-26

## 2024-08-21 RX ORDER — MAGNESIUM, ALUMINUM HYDROXIDE 200-225/5
30 SUSPENSION, ORAL (FINAL DOSE FORM) ORAL EVERY 4 HOURS
Refills: 0 | Status: DISCONTINUED | OUTPATIENT
Start: 2024-08-21 | End: 2024-08-26

## 2024-08-21 RX ORDER — SODIUM CHLORIDE 9 MG/ML
1000 INJECTION INTRAMUSCULAR; INTRAVENOUS; SUBCUTANEOUS ONCE
Refills: 0 | Status: COMPLETED | OUTPATIENT
Start: 2024-08-21 | End: 2024-08-21

## 2024-08-21 RX ADMIN — SODIUM CHLORIDE 1000 MILLILITER(S): 9 INJECTION INTRAMUSCULAR; INTRAVENOUS; SUBCUTANEOUS at 15:43

## 2024-08-21 RX ADMIN — Medication 80 MILLIGRAM(S): at 15:39

## 2024-08-21 RX ADMIN — SODIUM CHLORIDE 1000 MILLILITER(S): 9 INJECTION INTRAMUSCULAR; INTRAVENOUS; SUBCUTANEOUS at 16:43

## 2024-08-21 RX ADMIN — Medication 100 MILLILITER(S): at 20:19

## 2024-08-21 RX ADMIN — Medication 10 MG/HR: at 23:58

## 2024-08-21 RX ADMIN — ONDANSETRON 4 MILLIGRAM(S): 2 INJECTION, SOLUTION INTRAMUSCULAR; INTRAVENOUS at 15:42

## 2024-08-21 RX ADMIN — Medication 10 MG/HR: at 15:42

## 2024-08-21 NOTE — H&P ADULT - ASSESSMENT
Isrrael Cortez is a 49 year old male with PMHx of HTN, prediabetes, and gastric CA (on neoadjuvant FLOT chemotherapy, received first session on 8/14/24) who presented to the ED on 8/21/24 for complaints of vomiting blood and passing out and admitted for symptomatic anemia secondary to suspected upper GI bleed.    Symptomatic anemia and syncope secondary to suspected upper GI bleed  In pt with recent diagnosis of gastric CA  Complaints of fatigue yesterday, "winded" while ambulating up the stairs today  Felt nauseous and lightheaded while in bathroom today, vomited up coffee ground emesis in bathroom sink and then fell onto ground  Wife states patient found on the floor, face down, projectile vomiting copious amounts of coffee ground emesis  Presumed associated head hit and LOC, total duration of episode ~2 minutes  Hgb 10.9 on admission, unclear baseline but hgb 10.6 (on 8/14/24)  CT head without acute intracranial findings  CTA A/P without active contrast extravasation into the GI tract to suspect active ongoing bleeding at the time of scanning but with gastric wall thickening and edema in the gastric antrum and severely distended fluid-filled stomach with decompressed duodenum which may relate to gastric outlet obstruction versus gastroparesis  S/p 2L NS bolus, ondansetron 4 mg IV, and pantoprazole 80 mg IV in the ED  IVF with  cc/hr started, continue pantoprazole 8 mg/hr   Clear liquid diet for now, NPO at midnight in case of GI intervention, hold DVT ppx  F/u repeat CBC tonight and in AM, transfuse for hgb < 7  Will hold off on echocardiogram given clear etiology of syncope is likely due to blood loss  Pending GI evaluation - please f/u in AM    Lactic acidosis, resolved  Lactic acid 2.8 on admission  S/p 2L NS bolus in the ED    Leukocytosis, likely reactive to steroid administration  Currently received dexamethasone 4 mg 2 tabs BID day before, during, and after chemotherapy as per oncology notes  Afebrile, no signs of infection  Monitor WBC trend      Chronic medical conditions:   HTN: previously on losartan but no longer takes it  Prediabetes with hyperglycemia: last known A1c 5.9 (on 7/10/24), blood glucose 286 on admission, POC qac and qhs, low dose SSI qac started, blood glucose goal < 180    Plan of care discussed with wife at bedside. Isrrael Cortez is a 49 year old male with PMHx of HTN, prediabetes, and gastric CA (on neoadjuvant FLOT chemotherapy, received first session on 8/14/24) who presented to the ED on 8/21/24 for complaints of vomiting blood and passing out and admitted for symptomatic anemia secondary to suspected upper GI bleed.    Symptomatic anemia and syncope secondary to suspected upper GI bleed  In pt with recent diagnosis of gastric CA  Complaints of fatigue yesterday, "winded" while ambulating up the stairs today  Felt nauseous and lightheaded while in bathroom today, vomited up coffee ground emesis in bathroom sink and then fell onto ground  Wife states patient found on the floor, face down, projectile vomiting copious amounts of coffee ground emesis  Presumed associated head hit and LOC, total duration of episode ~2 minutes  Hgb 10.9 on admission, unclear baseline but hgb 10.6 (on 8/14/24)  CT head without acute intracranial findings  CTA A/P without active contrast extravasation into the GI tract to suspect active ongoing bleeding at the time of scanning but with gastric wall thickening and edema in the gastric antrum and severely distended fluid-filled stomach with decompressed duodenum which may relate to gastric outlet obstruction versus gastroparesis  S/p 2L NS bolus, ondansetron 4 mg IV, and pantoprazole 80 mg IV in the ED  IVF with  cc/hr started, continue pantoprazole 8 mg/hr   Clear liquid diet for now, NPO at midnight in case of GI intervention, hold DVT ppx  F/u repeat CBC tonight and in AM, transfuse for hgb < 7  Will hold off on echocardiogram given clear etiology of syncope is likely due to blood loss  Telemetry to monitor for arrhythmias  Pending GI evaluation - please f/u in AM    Lactic acidosis, resolved  Lactic acid 2.8 on admission  S/p 2L NS bolus in the ED    Leukocytosis, likely reactive to steroid administration  Currently received dexamethasone 4 mg 2 tabs BID day before, during, and after chemotherapy as per oncology notes  Afebrile, no signs of infection  Monitor WBC trend      Chronic medical conditions:   HTN: previously on losartan but no longer takes it  Prediabetes with hyperglycemia: last known A1c 5.9 (on 7/10/24), blood glucose 286 on admission, POC qac and qhs, low dose SSI qac started, blood glucose goal < 180    Plan of care discussed with wife at bedside. Isrrael Cortez is a 49 year old male with PMHx of HTN, prediabetes, and gastric CA (on neoadjuvant FLOT chemotherapy, received first session on 8/14/24) who presented to the ED on 8/21/24 for complaints of vomiting blood and passing out and admitted for symptomatic anemia secondary to suspected upper GI bleed.    Symptomatic anemia and syncope secondary to suspected upper GI bleed  In pt with recent diagnosis of gastric CA  Complaints of fatigue yesterday, "winded" while ambulating up the stairs today  Felt nauseous and lightheaded while in bathroom today, vomited up coffee ground emesis in bathroom sink and then fell onto floor  Wife states patient found on the floor, face down, projectile vomiting copious amounts of coffee ground emesis  Presumed associated head hit and LOC, total duration of episode ~2 minutes  Hgb 10.9 on admission, unclear baseline but hgb 10.6 (on 8/14/24)  CT head without acute intracranial findings  CTA A/P without active contrast extravasation into the GI tract to suspect active ongoing bleeding at the time of scanning but with gastric wall thickening and edema in the gastric antrum and severely distended fluid-filled stomach with decompressed duodenum which may relate to gastric outlet obstruction versus gastroparesis  S/p 2L NS bolus, ondansetron 4 mg IV, and pantoprazole 80 mg IV in the ED  IVF with  cc/hr started, continue pantoprazole 8 mg/hr   Clear liquid diet for now, NPO at midnight in case of GI intervention, hold DVT ppx  F/u repeat CBC tonight and in AM, transfuse for hgb < 7  Will hold off on echocardiogram given clear etiology of syncope is likely due to blood loss  Telemetry to monitor for arrhythmias  Pending GI evaluation - please f/u in AM    Lactic acidosis, resolved  Lactic acid 2.8 on admission  S/p 2L NS bolus in the ED    Leukocytosis, likely reactive to steroid administration  Currently received dexamethasone 4 mg 2 tabs BID day before, during, and after chemotherapy as per oncology notes  Afebrile, no signs of infection  Monitor WBC trend      Chronic medical conditions:   HTN: previously on losartan but no longer takes it  Prediabetes with hyperglycemia: last known A1c 5.9 (on 7/10/24), blood glucose 286 on admission, POC qac and qhs, low dose SSI qac started, blood glucose goal < 180    Plan of care discussed with wife at bedside.

## 2024-08-21 NOTE — ED ADULT NURSE NOTE - OBJECTIVE STATEMENT
48 yo male, biba from home s/p syncopal episode. Per patient's spouse, patient had reported "not feeling well today", stated he felt weak and tired but assumed they were side effects from chemo received on 8/14/24. Patient walk to bathroom to vomit, while vomiting wife left to get water from the kitchen then heard a "big thump". Spouse rushed to patient's side found him on the floor face down, patient with no noted injuries, denies pain. Patient also report after fainting, he projectile vomited coffee ground emesis. Patient denies pain, sob, dizziness PMH: Stomach cancer, started chemo on 8/14/24.

## 2024-08-21 NOTE — ED ADULT NURSE REASSESSMENT NOTE - NS ED NURSE REASSESS COMMENT FT1
Received pt on Protonix drip 80mg/100ml at 10ml/hr Billing Type: Third-Party Bill Lab Facility: [id number] Bill For Surgical Tray: no Expected Date Of Service: 05/09/2019 Performing Laboratory: [id number]

## 2024-08-21 NOTE — H&P ADULT - NSHPPHYSICALEXAM_GEN_ALL_CORE
T(C): 36.8 (08-21-24 @ 22:20), Max: 36.9 (08-21-24 @ 19:59)  HR: 93 (08-21-24 @ 22:20) (93 - 126)  BP: 103/69 (08-21-24 @ 22:20) (81/71 - 111/74)  RR: 14 (08-21-24 @ 22:20) (10 - 17)  SpO2: 98% (08-21-24 @ 22:20) (95% - 100%)    CONSTITUTIONAL: Well groomed, no apparent distress  EYES: PERRLA and symmetric, EOMI  ENMT: Oral mucosa with moist membranes  RESP: No respiratory distress, no use of accessory muscles; CTA b/l  CV: RRR, R. IJ chest port in place  GI: Soft, NT, ND T(C): 36.8 (08-21-24 @ 22:20), Max: 36.9 (08-21-24 @ 19:59)  HR: 93 (08-21-24 @ 22:20) (93 - 126)  BP: 103/69 (08-21-24 @ 22:20) (81/71 - 111/74)  RR: 14 (08-21-24 @ 22:20) (10 - 17)  SpO2: 98% (08-21-24 @ 22:20) (95% - 100%)    CONSTITUTIONAL: Well groomed, no apparent distress  EYES: PERRLA and symmetric, EOMI  ENMT: Oral mucosa with moist membranes, R. bottom lip with laceration without active bleeding (from syncopal episode as per pt)  RESP: No respiratory distress, no use of accessory muscles; CTA b/l  CV: RRR, R. IJ chest port in place  GI: Soft, NT, ND

## 2024-08-21 NOTE — ED ADULT TRIAGE NOTE - CHIEF COMPLAINT QUOTE
as per patient EMS " first chemo treatment ( FLOT) last Wednesday, and today abdominal discomfort and vomited coffee ground emesis, and blood pressure low sys 80's" [ hx of stomach CA]

## 2024-08-21 NOTE — ED PROVIDER NOTE - PHYSICAL EXAMINATION
GEN: Awake, alert, interactive, NAD.  HEAD AND NECK: NC/AT. Airway patent. Neck supple.   EYES:  Clear b/l. EOMI. PERRL.   ENT: Moist mucus membranes. (+) abrasion to the lower lip.   CARDIAC: Regular rate, regular rhythm. No evident pedal edema.    RESP/CHEST: Normal respiratory effort with no use of accessory muscles or retractions. Clear throughout on auscultation.  ABD: soft, non-distended, non-tender. No rebound, no guarding.   BACK: No midline spinal TTP. No CVAT.   EXTREMITIES: Moving all extremities with no apparent deformities.   SKIN: Warm, dry, intact normal color. No rash.   NEURO: AOx3, CN II-XII grossly intact, no focal deficits.   PSYCH: Appropriate mood and affect.

## 2024-08-21 NOTE — ED ADULT NURSE NOTE - NSICDXPASTMEDICALHX_GEN_ALL_CORE_FT
PAST MEDICAL HISTORY:  H/O syncope     History of prediabetes     History of weight loss     HTN (hypertension)     Hyperlipidemia     Malignant neoplasm of stomach

## 2024-08-21 NOTE — ED ADULT NURSE NOTE - NSFALLHARMRISKINTERV_ED_ALL_ED
Assistance OOB with selected safe patient handling equipment if applicable/Communicate risk of Fall with Harm to all staff, patient, and family/Orthostatic vital signs/Provide visual cue: red socks, yellow wristband, yellow gown, etc/Reinforce activity limits and safety measures with patient and family/Bed in lowest position, wheels locked, appropriate side rails in place/Call bell, personal items and telephone in reach/Instruct patient to call for assistance before getting out of bed/chair/stretcher/Non-slip footwear applied when patient is off stretcher/[place] to call system/Physically safe environment - no spills, clutter or unnecessary equipment/Purposeful Proactive Rounding/Room/bathroom lighting operational, light cord in reach

## 2024-08-21 NOTE — H&P ADULT - TIME BILLING
coordination of care with ER PA and ER RN, reviewing notes from recent hospitalization here at [facility], reviewing outpatient oncology notes, obtaining history, performing a physical examination, reviewing and interpreting labs and imaging, ordering further studies and tests, explaining the diagnosis and treatment plan to patient and wife at bedside, completing medication reconciliation, and documentation as above.

## 2024-08-21 NOTE — CONSULT NOTE ADULT - SUBJECTIVE AND OBJECTIVE BOX
Full consult dictated    Plan:  50 yo M  with h/o HTN, prediabetes, and gastric CA (on neoadjuvant FLOT chemotherapy, received first session on 8/14/24). Pt admitted with coffee ground emesis. Pt with syncopal episode prior to admission.  Pt with recent increased fatigue - on chemo.  Pt diagnosed with Gastric Ca in 7/2024 (Had ulcerated mass). Last EGD revealed H Pylori and pt was treated with Amox and Biaxin. Plan is for surgeery after chemo.  Pt will be started on IV  protonix and PO carafate. Clear liquids; CBC in AM

## 2024-08-21 NOTE — ED PROVIDER NOTE - CLINICAL SUMMARY MEDICAL DECISION MAKING FREE TEXT BOX
48 y/o male with htn, stomach ca on chemo here with vomiting coffee ground with syncopal episode today. Concern for GI bleed. Vs stable.   Will obtain basic labs check H:H, type and screen and obtain ct head and cta abdomen/pelvis r/o GI bleed. Pt treated with pantoprazole. 50 y/o male with htn, stomach ca on chemo here with vomiting coffee ground with syncopal episode today. Concern for GI bleed. Vs stable.   Will obtain basic labs check H:H, type and screen and obtain ct head and cta abdomen/pelvis r/o GI bleed. Pt treated with pantoprazole.    labs reviewed H:H stable,   CTA no active bleeding. , Gastritis vs gastric ulcer.   Gi consulted Dr Beauchamp and will come to see patient tomorrow.

## 2024-08-21 NOTE — H&P ADULT - HISTORY OF PRESENT ILLNESS
Isrrael Cortez is a 49 year old male with PMHx of HTN, prediabetes, and gastric CA (on neoadjuvant FLOT chemotherapy, received first session on 8/14/24) who presented to the ED on 8/21/24 for complaints of vomiting blood and passing out.    Patient reports he was extremely tired yesterday but attributed it to recent initiation of chemotherapy. Wife called oncology who advised her to monitor. This morning, patient went downstairs for a snack and then became "winded" while ambulating back up the stairs to his bedroom. He laid down in bed and then went to the bathroom. Felt nauseous and lightheaded. Vomited up coffee ground emesis in the bathroom sink. Then he remembers falling onto the ground. Wife was downstairs when she heard a thud which prompted her to come upstairs. Found patient on the floor, face down, projectile vomiting copious amounts of coffee ground emesis. Total duration of unconsciousness is estimated to be about two minutes. No prior episodes in the past. Brought to the ER for further evaluation.     Recent admission from 7/11/24 - 7/13/24 for symptomatic anemia. Hemoglobin 5.2 on admission, received 3 units pRBCs with improvement of hemoglobin to 9.3. Underwent EGD on 7/12/24 which revealed large ulcerated mass on lesser curvature on stomach and without evidence of active bleeding. Biopsy returned for adenocarcinoma, moderately differentiated in background of ulceration and H. pylori associated gastritis and intestinal metaplasia. Discharged on PPI. Advised to follow up outpatient with oncology. Since discharge, completed two week course of amoxicillin and clarithromycin. During follow up, underwent CT chest which was unremarkable for metastatic disease. 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 Underwent R. IJ chest port placement on 8/8/24. Started on neoadjuvant FLOT therapy on 8/14/24, which is planned every two weeks. Plan is for surgical resection after chemotherapy, followed by adjuvant chemotherapy.     In the ED, VSS except HR as elevated as 126 and BP as low as 84/55. WBC 24.33K, hgb 10.9, sodium 133, BUN 31, blood glucose 286. Lactic acid 2.8. CT head without acute intracranial findings. CTA A/P without active contrast extravasation into the GI tract to suspect active ongoing bleeding at the time of scanning but with gastric wall thickening and edema in the gastric antrum and severely distended fluid-filled stomach with decompressed duodenum which may relate to gastric outlet obstruction versus gastroparesis. Received 2L NS bolus, ondansetron 4 mg IV, and pantoprazole 80 mg IV. Started on pantoprazole 8 mg/hr. ER PA discussed with GI who will see patient in AM but does not have any current recommendations at this time.  Isrrael Cortez is a 49 year old male with PMHx of HTN, prediabetes, and gastric CA (on neoadjuvant FLOT chemotherapy, received first session on 8/14/24) who presented to the ED on 8/21/24 for complaints of vomiting blood and passing out.    Patient reports he was extremely tired yesterday but attributed it to recent initiation of chemotherapy. Wife called oncology who advised her to monitor. This morning, patient went downstairs for a snack and then became "winded" while ambulating back up the stairs to his bedroom. He laid down in bed and then went to the bathroom. Felt nauseous and lightheaded. Vomited up coffee ground emesis in the bathroom sink. Then he remembers falling onto the floor and hitting his right bottom lip. Wife was downstairs when she heard a thud which prompted her to come upstairs. Found patient on the floor, face down, projectile vomiting copious amounts of coffee ground emesis. Total duration of unconsciousness is estimated to be about two minutes. Brought to the ER for further evaluation.     Recent admission from 7/11/24 - 7/13/24 for symptomatic anemia. Hemoglobin 5.2 on admission, received 3 units pRBCs with improvement of hemoglobin to 9.3. Underwent EGD on 7/12/24 which revealed large ulcerated mass on lesser curvature on stomach and without evidence of active bleeding. Biopsy returned for adenocarcinoma, moderately differentiated in background of ulceration and H. pylori associated gastritis and intestinal metaplasia. Discharged on PPI. Advised to follow up outpatient with oncology. Since discharge, completed two week course of amoxicillin and clarithromycin. During follow up, underwent CT chest which was unremarkable for metastatic disease. 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 Underwent R. IJ chest port placement on 8/8/24. Started on neoadjuvant FLOT therapy on 8/14/24, which is planned every two weeks. Plan is for surgical resection after chemotherapy, followed by adjuvant chemotherapy.     In the ED, VSS except HR as elevated as 126 and BP as low as 84/55. WBC 24.33K, hgb 10.9, sodium 133, BUN 31, blood glucose 286. Lactic acid 2.8. CT head without acute intracranial findings. CTA A/P without active contrast extravasation into the GI tract to suspect active ongoing bleeding at the time of scanning but with gastric wall thickening and edema in the gastric antrum and severely distended fluid-filled stomach with decompressed duodenum which may relate to gastric outlet obstruction versus gastroparesis. Received 2L NS bolus, ondansetron 4 mg IV, and pantoprazole 80 mg IV. Started on pantoprazole 8 mg/hr. ER PA discussed with GI who will see patient in AM but does not have any current recommendations at this time.

## 2024-08-21 NOTE — ED PROVIDER NOTE - OBJECTIVE STATEMENT
48 y/o male with htn, stomach ca on chemo, last chemo 1 week ago here with vomiting coffee ground emesis and syncope today. Pt states he has been feeling well since yesterday and then had syncopal episode today hitting his face. When pt woken up he started to vomit multiple times coffee ground emesis. Denies abdominal pain, diarrhea, fever, chills, chest pain, dyspnea, dizziness, headache. Denies recent travel, sick contact. Pt denies being on any blood thinners.

## 2024-08-21 NOTE — H&P ADULT - NSHPLABSRESULTS_GEN_ALL_CORE
10.9   24.33 )-----------( 158      ( 21 Aug 2024 14:50 )             35.7     133<L>  |  96  |  31<H>  ----------------------------<  286<H>     08-21  4.8   |  28  |  1.27    Ca    10.3<H>      21 Aug 2024 14:50    TPro  6.0  /  Alb  3.2<L>  /  TBili  0.4  /  DBili  x   /  AST  13<L>  /  ALT  23  /  AlkPhos  87  08-21    PT/INR: 12.9/1.08 (08-21-24 @ 18:14)  PTT: 26.1 (08-21-24 @ 18:14)    CT head without contrast 8/21/24  IMPRESSION:  No acute intracranial hemorrhage, mass effect, or midline shift.    CTA A/P with IV contrast 8/21/24  FINDINGS:  LOWER CHEST: Lung bases within normal limits. Visualized catheter tip in the right atrium near the valve.    LIVER: Stable unchanged hepatic cysts. Hepatic steatosis.  BILE DUCTS: Normal caliber.  GALLBLADDER: Cholelithiasis. The gallbladder is nondistended.  SPLEEN: Within normal limits.  PANCREAS: Homogeneous enhancement. No duct dilatation or mass.  ADRENALS: Within normal limits.  KIDNEYS/URETERS: There is focal cortical renal scarring the left kidney lower pole which may be sequela from prior infarct or inflammatory insults. Otherwise normal, symmetric enhancement of the kidneys. No hydronephrosis, hydroureter or nephroureterolithiasis.    BLADDER: Within normal limits.  REPRODUCTIVE ORGANS: Prostate within upper limits of normal size measuring 4.7 x 3.5 x 4.2cm.    BOWEL: The stomach is severely distended and fluid-filled and demonstrates submucosal edema and wall thickening in the gastric antrum and near the pylorus. There is increased mucosal enhancement and suggestion of a mucosal irregularity at the posterior antral wall, image 16:58, that is concerning for a gastric ulcer. The duodenum is decompressed and not wall-thickened. There is pre-existing hyperattenuating material layering in the stomach lumen, no discrete active iv contrast extravasation to suspect ongoing GI bleeding. No bowel obstruction. Appendix is not visualized. No evidence of inflammation in the pericecal region. Cecum is somewhat displaced to the right upper quadrant. There is no colonic obstruction.  PERITONEUM/RETROPERITONEUM: No pneumoperitoneum. No free or loculated fluid in the abdomen or pelvis. No pneumatosis.  VESSELS: Hepatic veins, portal vein, SMV, renal veins and IVC are patent. The SMV is compressed by the dilated stomach in the proximal portion.  No portal venous gas.  LYMPH NODES: No lymphadenopathy.  ABDOMINAL WALL: Within normal limits.  BONES: Within normal limits.    IMPRESSION:  No active contrast extravasation into the GI tract to suspect active ongoing bleeding at the time of scanning.    Gastric wall thickening and edema in the gastric antrum with suggestion of gastritis, a gastric ulcer and peptic disease. Consider upper endoscopy for correlation.    Severely distended fluid-filled stomach with decompressed duodenum which may relate to gastric outlet obstruction versus gastroparesis.

## 2024-08-21 NOTE — H&P ADULT - NSICDXPASTMEDICALHX_GEN_ALL_CORE_FT
PAST MEDICAL HISTORY:  History of prediabetes     HTN (hypertension)     Malignant neoplasm of stomach

## 2024-08-21 NOTE — ED PROVIDER NOTE - NS ED ROS FT
CONSTITUTIONAL: No fever, no chills, no fatigue  EYES: No visual changes  ENT: No ear pain, no sore throat  CARDIOVASCULAR: No chest pain, no palpitations  RESPIRATORY: No cough, no SOB  GI: No abdominal pain, no constipation, no diarrhea  GENITOURINARY: No dysuria, no frequency, no hematuria  MUSKULOSKELETAL: No backpain, no joint pain, no myalgias  SKIN: No rash  NEURO: No headache    ALL OTHER SYSTEMS NEGATIVE.

## 2024-08-21 NOTE — ED PROVIDER NOTE - ATTENDING CONTRIBUTION TO CARE
Patient seen with ROBERTO Purcell.  P/w coffee ground hematemesis since yesterday, denies abdominal pain or chiara hematemsis, stool appears melanotic.  H/o gastric cancer, H pylori, and known PUD.  Denies abdominal pain.  BP soft, patient himself appears well perfused and is mentating well.  S/p first round of chemo at [facility] last week.  Plan for labs, CT imaging, GI consult, will start protonix drip, admit.

## 2024-08-22 PROBLEM — E78.5 HYPERLIPIDEMIA, UNSPECIFIED: Chronic | Status: INACTIVE | Noted: 2024-07-30 | Resolved: 2024-08-21

## 2024-08-22 PROBLEM — Z87.898 PERSONAL HISTORY OF OTHER SPECIFIED CONDITIONS: Chronic | Status: INACTIVE | Noted: 2024-07-30 | Resolved: 2024-08-21

## 2024-08-22 LAB
FERRITIN SERPL-MCNC: 420 NG/ML — HIGH (ref 30–400)
FOLATE SERPL-MCNC: 10.8 NG/ML — SIGNIFICANT CHANGE UP
GLUCOSE BLDC GLUCOMTR-MCNC: 127 MG/DL — HIGH (ref 70–99)
GLUCOSE BLDC GLUCOMTR-MCNC: 139 MG/DL — HIGH (ref 70–99)
GLUCOSE BLDC GLUCOMTR-MCNC: 152 MG/DL — HIGH (ref 70–99)
GLUCOSE BLDC GLUCOMTR-MCNC: 153 MG/DL — HIGH (ref 70–99)
GLUCOSE BLDC GLUCOMTR-MCNC: 157 MG/DL — HIGH (ref 70–99)
GLUCOSE BLDC GLUCOMTR-MCNC: 159 MG/DL — HIGH (ref 70–99)
HCT VFR BLD CALC: 26.6 % — LOW (ref 39–50)
HCT VFR BLD CALC: 26.9 % — LOW (ref 39–50)
HCT VFR BLD CALC: 27.6 % — LOW (ref 39–50)
HGB BLD-MCNC: 8.1 G/DL — LOW (ref 13–17)
HGB BLD-MCNC: 8.6 G/DL — LOW (ref 13–17)
HGB BLD-MCNC: 8.9 G/DL — LOW (ref 13–17)
IRON SATN MFR SERPL: 46 % — SIGNIFICANT CHANGE UP (ref 16–55)
IRON SATN MFR SERPL: 82 UG/DL — SIGNIFICANT CHANGE UP (ref 45–165)
MCHC RBC-ENTMCNC: 24.6 PG — LOW (ref 27–34)
MCHC RBC-ENTMCNC: 25.7 PG — LOW (ref 27–34)
MCHC RBC-ENTMCNC: 25.8 PG — LOW (ref 27–34)
MCHC RBC-ENTMCNC: 30.5 G/DL — LOW (ref 32–36)
MCHC RBC-ENTMCNC: 32 G/DL — SIGNIFICANT CHANGE UP (ref 32–36)
MCHC RBC-ENTMCNC: 32.2 G/DL — SIGNIFICANT CHANGE UP (ref 32–36)
MCV RBC AUTO: 80 FL — SIGNIFICANT CHANGE UP (ref 80–100)
MCV RBC AUTO: 80.5 FL — SIGNIFICANT CHANGE UP (ref 80–100)
MCV RBC AUTO: 80.9 FL — SIGNIFICANT CHANGE UP (ref 80–100)
NRBC # BLD: 0 /100 WBCS — SIGNIFICANT CHANGE UP (ref 0–0)
PLATELET # BLD AUTO: 158 K/UL — SIGNIFICANT CHANGE UP (ref 150–400)
PLATELET # BLD AUTO: 161 K/UL — SIGNIFICANT CHANGE UP (ref 150–400)
PLATELET # BLD AUTO: 168 K/UL — SIGNIFICANT CHANGE UP (ref 150–400)
RBC # BLD: 3.29 M/UL — LOW (ref 4.2–5.8)
RBC # BLD: 3.34 M/UL — LOW (ref 4.2–5.8)
RBC # BLD: 3.45 M/UL — LOW (ref 4.2–5.8)
RBC # FLD: 23.9 % — HIGH (ref 10.3–14.5)
RBC # FLD: 24.2 % — HIGH (ref 10.3–14.5)
RBC # FLD: 26.1 % — HIGH (ref 10.3–14.5)
TIBC SERPL-MCNC: 176 UG/DL — LOW (ref 220–430)
UIBC SERPL-MCNC: 94 UG/DL — LOW (ref 110–370)
VIT B12 SERPL-MCNC: 1086 PG/ML — SIGNIFICANT CHANGE UP (ref 232–1245)
WBC # BLD: 21.37 K/UL — HIGH (ref 3.8–10.5)
WBC # BLD: 28.92 K/UL — HIGH (ref 3.8–10.5)
WBC # BLD: 30.94 K/UL — HIGH (ref 3.8–10.5)
WBC # FLD AUTO: 21.37 K/UL — HIGH (ref 3.8–10.5)
WBC # FLD AUTO: 28.92 K/UL — HIGH (ref 3.8–10.5)
WBC # FLD AUTO: 30.94 K/UL — HIGH (ref 3.8–10.5)

## 2024-08-22 PROCEDURE — 99223 1ST HOSP IP/OBS HIGH 75: CPT

## 2024-08-22 PROCEDURE — 99232 SBSQ HOSP IP/OBS MODERATE 35: CPT

## 2024-08-22 RX ADMIN — Medication 10 MG/HR: at 11:08

## 2024-08-22 RX ADMIN — SUCRALFATE 1 GRAM(S): 1 SUSPENSION ORAL at 23:41

## 2024-08-22 RX ADMIN — SUCRALFATE 1 GRAM(S): 1 SUSPENSION ORAL at 11:08

## 2024-08-22 RX ADMIN — Medication 1: at 08:13

## 2024-08-22 RX ADMIN — SUCRALFATE 1 GRAM(S): 1 SUSPENSION ORAL at 01:22

## 2024-08-22 RX ADMIN — Medication 10 MG/HR: at 01:24

## 2024-08-22 RX ADMIN — Medication 100 MILLILITER(S): at 05:42

## 2024-08-22 RX ADMIN — Medication 10 MG/HR: at 20:21

## 2024-08-22 RX ADMIN — SUCRALFATE 1 GRAM(S): 1 SUSPENSION ORAL at 17:00

## 2024-08-22 RX ADMIN — SUCRALFATE 1 GRAM(S): 1 SUSPENSION ORAL at 05:23

## 2024-08-22 RX ADMIN — Medication 100 MILLILITER(S): at 01:24

## 2024-08-22 NOTE — CONSULT NOTE ADULT - SUBJECTIVE AND OBJECTIVE BOX
Chief Complaint:  Patient is a 49y old  Male who presents with a chief complaint of Symptomatic anemia secondary to suspected upper GI bleed (22 Aug 2024 08:36)      HPI:  Isrrael Cortez is a 49 year old male with PMHx of HTN, prediabetes, and gastric CA (on neoadjuvant FLOT chemotherapy, received first session on 24) who presented to the ED on 24 for complaints of vomiting blood and passing out.    Patient reports he was extremely tired yesterday but attributed it to recent initiation of chemotherapy. Wife called oncology who advised her to monitor. This morning, patient went downstairs for a snack and then became "winded" while ambulating back up the stairs to his bedroom. He laid down in bed and then went to the bathroom. Felt nauseous and lightheaded. Vomited up coffee ground emesis in the bathroom sink. Then he remembers falling onto the floor and hitting his right bottom lip. Wife was downstairs when she heard a thud which prompted her to come upstairs. Found patient on the floor, face down, projectile vomiting copious amounts of coffee ground emesis. Total duration of unconsciousness is estimated to be about two minutes. Brought to the ER for further evaluation.     Recent admission from 24 - 24 for symptomatic anemia. Hemoglobin 5.2 on admission, received 3 units pRBCs with improvement of hemoglobin to 9.3. Underwent EGD on 24 which revealed large ulcerated mass on lesser curvature on stomach and without evidence of active bleeding. Biopsy returned for adenocarcinoma, moderately differentiated in background of ulceration and H. pylori associated gastritis and intestinal metaplasia. Discharged on PPI. Advised to follow up outpatient with oncology. Since discharge, completed two week course of amoxicillin and clarithromycin. During follow up, underwent CT chest which was unremarkable for metastatic disease. Underwent diagnostic laparoscopy on 24 which was notable for moderately differentiated gastric adenocarcinoma of lesser curvature and peritoneal washings with rare groups of atypical, degenerated cells. Underwent R. IJ chest port placement on 24. Started on neoadjuvant FLOT therapy on 24, which is planned every two weeks. Plan is for surgical resection after chemotherapy, followed by adjuvant chemotherapy.     In the ED, VSS except HR as elevated as 126 and BP as low as 84/55. WBC 24.33K, hgb 10.9, sodium 133, BUN 31, blood glucose 286. Lactic acid 2.8. CT head without acute intracranial findings. CTA A/P without active contrast extravasation into the GI tract to suspect active ongoing bleeding at the time of scanning but with gastric wall thickening and edema in the gastric antrum and severely distended fluid-filled stomach with decompressed duodenum which may relate to gastric outlet obstruction versus gastroparesis. Received 2L NS bolus, ondansetron 4 mg IV, and pantoprazole 80 mg IV. Started on pantoprazole 8 mg/hr. ER PA discussed with GI who will see patient in AM but does not have any current recommendations at this time.  (21 Aug 2024 22:31)        Interval Hx:     50 Y/O male with PMHx of gastric cancer on FLOT and HTN with PSHx of diagnostic laparoscopy presented to the hospital for UGIB. Patient seen and examined at bedside resting comfortably. Patient stated yesterday he became dizzy and syncopized with multiple episodes of coffee ground emesis. Patient states symptoms have now resolved. States he follows with Medical Oncologist Dr. Howard and Surgical Oncologist Dr. Carrasco at [facility]. States he had a diagnostic laparoscopy  to assess for mets and sent a washout cytology that was atypical with no conclusive evidence of metastasis. Underwent his first neoadjuvant chemotherapy session last Wednesday to which he experienced a general malaise for 3 days. He is currently planning to undergo 2 months of neoadjuvant chemotherapy twice a month, undergo surgical resection, and continue adjuvant chemotherapy. Denies fevers, chills, chest pain, SOB, dizziness, N/V/D.           PMH/PSH:PAST MEDICAL & SURGICAL HISTORY:  HTN (hypertension)      Malignant neoplasm of stomach      History of prediabetes      History of esophagogastroduodenoscopy (EGD)          Allergies:  No Known Drug Allergies  shellfish (Anaphylaxis)      Medications:  acetaminophen     Tablet .. 650 milliGRAM(s) Oral every 6 hours PRN  aluminum hydroxide/magnesium hydroxide/simethicone Suspension 30 milliLiter(s) Oral every 4 hours PRN  dextrose 5%. 1000 milliLiter(s) IV Continuous <Continuous>  dextrose 5%. 1000 milliLiter(s) IV Continuous <Continuous>  dextrose 50% Injectable 25 Gram(s) IV Push once  dextrose 50% Injectable 25 Gram(s) IV Push once  dextrose 50% Injectable 12.5 Gram(s) IV Push once  dextrose Oral Gel 15 Gram(s) Oral once PRN  glucagon  Injectable 1 milliGRAM(s) IntraMuscular once  insulin lispro (ADMELOG) corrective regimen sliding scale   SubCutaneous three times a day before meals  insulin lispro (ADMELOG) corrective regimen sliding scale   SubCutaneous at bedtime  lactated ringers. 1000 milliLiter(s) IV Continuous <Continuous>  melatonin 3 milliGRAM(s) Oral at bedtime PRN  ondansetron Injectable 4 milliGRAM(s) IV Push every 8 hours PRN  pantoprazole Infusion 8 mG/Hr IV Continuous <Continuous>  sucralfate suspension 1 Gram(s) Oral four times a day      Review of Systems:  Negative except for HPI    Relevant Family History:   FAMILY HISTORY:      Relevant Social History: Alcohol ( -) , Tobacco ( -) , Illicit drugs (- )     Physical Exam:    Vital Signs:  Vital Signs Last 24 Hrs  T(C): 36.8 (22 Aug 2024 09:46), Max: 37.1 (22 Aug 2024 00:55)  T(F): 98.2 (22 Aug 2024 09:46), Max: 98.7 (22 Aug 2024 00:55)  HR: 86 (22 Aug 2024 09:46) (83 - 126)  BP: 103/71 (22 Aug 2024 09:46) (81/71 - 111/74)  BP(mean): 75 (21 Aug 2024 19:59) (75 - 92)  RR: 18 (22 Aug 2024 09:46) (10 - 18)  SpO2: 100% (22 Aug 2024 09:46) (95% - 100%)    Parameters below as of 22 Aug 2024 09:46  Patient On (Oxygen Delivery Method): room air      Daily Height in cm: 170.18 (21 Aug 2024 14:00)    Daily Weight in k.8 (22 Aug 2024 00:55)    General:  Appears stated age, no distress, receiving 1u PRBC at bedside  HEENT:  NC/AT,  conjunctivae clear and pink  Chest:  Full & symmetric excursion, no increased effort  Cardiovascular:  Regular rhythm  Abdomen:  Soft, non tender, non distended, normoactive bowel sounds, no masses   Extremities:  no cyanosis, clubbing or edema  Skin:  No rash/erythema/ecchymoses/petechiae/wounds/abscess/warm/dry  Neuro/Psych:  Alert, oriented, no asterixis, no tremor, no encephalopathy    Laboratory:                          8.1    21.37 )-----------( 168      ( 22 Aug 2024 06:36 )             26.6         133<L>  |  96  |  31<H>  ----------------------------<  286<H>  4.8   |  28  |  1.27    Ca    10.3<H>      21 Aug 2024 14:50    TPro  6.0  /  Alb  3.2<L>  /  TBili  0.4  /  DBili  x   /  AST  13<L>  /  ALT  23  /  AlkPhos  87      LIVER FUNCTIONS - ( 21 Aug 2024 14:50 )  Alb: 3.2 g/dL / Pro: 6.0 gm/dL / ALK PHOS: 87 U/L / ALT: 23 U/L / AST: 13 U/L / GGT: x           PT/INR - ( 21 Aug 2024 18:14 )   PT: 12.9 sec;   INR: 1.08 ratio         PTT - ( 21 Aug 2024 18:14 )  PTT:26.1 sec  Urinalysis Basic - ( 21 Aug 2024 14:50 )    Color: x / Appearance: x / SG: x / pH: x  Gluc: 286 mg/dL / Ketone: x  / Bili: x / Urobili: x   Blood: x / Protein: x / Nitrite: x   Leuk Esterase: x / RBC: x / WBC x   Sq Epi: x / Non Sq Epi: x / Bacteria: x      Amylase Serum61 U/L      Lipase serum46 U/L       Ammonia--      Intake and Output    24 @ 07:  -  24 @ 07:00  --------------------------------------------------------  IN: 0 mL / OUT: 700 mL / NET: -700 mL    24 @ 07:01  -  24 @ 13:50  --------------------------------------------------------  IN: 297 mL / OUT: 0 mL / NET: 297 mL        Imaging:    < from: CT Angio Abdomen and Pelvis w/ IV Cont (24 @ 16:28) >    ACC: [id number] EXAM:  CT ANGIO ABD PELV (W)AW IC   ORDERED BY: Tess Fraire     PROCEDURE DATE:  2024          INTERPRETATION:  CLINICAL INFORMATION: Hematemesis    COMPARISON: CT abdomen and pelvis 2024.    CONTRAST/COMPLICATIONS:  IV Contrast: Omnipaque 350  99 cc administered   1 cc discarded  Oral Contrast: NONE  Complications: None reported at time of study completion    PROCEDURE:  CT of the Abdomen and Pelvis was performed.  Precontrast, Arterial and Delayed phases were performed.  Sagittal and coronal reformats were performed.    FINDINGS:  LOWER CHEST: Lung bases within normal limits. Visualized catheter tip in   the right atrium near the valve.    LIVER: Stable unchanged hepatic cysts. Hepatic steatosis.  BILE DUCTS: Normal caliber.  GALLBLADDER: Cholelithiasis. The gallbladder is nondistended.  SPLEEN: Within normal limits.  PANCREAS: Homogeneous enhancement. No duct dilatation or mass.  ADRENALS: Within normal limits.  KIDNEYS/URETERS: There is focal cortical renal scarring the left kidney   lower pole which may be sequela from prior infarct or inflammatory   insults. Otherwise normal, symmetric enhancement of the kidneys. No   hydronephrosis, hydroureter or nephroureterolithiasis.    BLADDER: Within normal limits.  REPRODUCTIVE ORGANS: Prostate within upper limits of normal size   measuring 4.7 x 3.5 x 4.2cm.    BOWEL: The stomach is severely distended and fluid-filled and   demonstrates submucosal edema and wall thickening in the gastric antrum   and near the pylorus. There is increased mucosal enhancement and   suggestion of a mucosal irregularity at the posterior antral wall, image   16:58, that is concerning for a gastric ulcer. The duodenum is   decompressed and not wall-thickened. There is pre-existing   hyperattenuating material layering in the stomach lumen, no discrete   active iv contrast extravasation to suspect ongoing GI bleeding.  No bowel obstruction. Appendix is not visualized. No evidence of   inflammation in the pericecal region. Cecum is somewhat displaced to the   right upper quadrant. There is no colonic obstruction.  PERITONEUM/RETROPERITONEUM: No pneumoperitoneum. No free or loculated   fluid in the abdomen or pelvis. No pneumatosis.  VESSELS: Hepatic veins, portal vein, SMV, renal veins and IVC are patent.   The SMV is compressed by the dilated stomach in the proximal portion.  No   portal venous gas.  LYMPH NODES: No lymphadenopathy.  ABDOMINAL WALL: Within normal limits.  BONES: Within normal limits.    IMPRESSION:  No active contrast extravasation into the GI tract to suspect active   ongoing bleeding at the time of scanning.    Gastric wall thickening and edema in the gastric antrum with suggestion   of gastritis, a gastric ulcer and peptic disease. Consider upper   endoscopy for correlation.    Severely distended fluid-filled stomach with decompressed duodenum which   may relate to gastric outlet obstruction versus gastroparesis.        --- End of Report ---            Keisha Gu MD  This document has been electronically signed. Aug 21 2024  5:07PM    < end of copied text >

## 2024-08-22 NOTE — PATIENT PROFILE ADULT - FALL HARM RISK - TYPE OF ASSESSMENT
Patient is a 89y old  Female who presents with a chief complaint of     SUBJECTIVE / OVERNIGHT EVENTS: Patient didnt  work with PT today reports ankle pain         T(C): 36.9 (02-21-22 @ 19:49), Max: 36.9 (02-21-22 @ 19:49)  HR: 68 (02-21-22 @ 19:49) (68 - 80)  BP: 126/72 (02-21-22 @ 19:49) (112/71 - 150/83)  RR: 18 (02-21-22 @ 19:49) (18 - 18)  SpO2: 96% (02-21-22 @ 19:49) (94% - 98%)    MEDICATIONS  (STANDING):  amLODIPine   Tablet 5 milliGRAM(s) Oral daily  cefTRIAXone   IVPB 1000 milliGRAM(s) IV Intermittent every 24 hours  lisinopril 20 milliGRAM(s) Oral daily  simvastatin 10 milliGRAM(s) Oral at bedtime    MEDICATIONS  (PRN):  acetaminophen     Tablet .. 650 milliGRAM(s) Oral every 6 hours PRN Moderate Pain (4 - 6)  ibuprofen  Tablet. 200 milliGRAM(s) Oral every 8 hours PRN Severe Pain (7 - 10)    PHYSICAL EXAM:  GENERAL: NAD, well-developed  HEAD:  Atraumatic, Normocephalic  EYES: EOMI, PERRLA, conjunctiva and sclera clear  NECK: Supple, No JVD  CHEST/LUNG: Clear to auscultation bilaterally; No wheeze  HEART: Regular rate and rhythm; No murmurs, rubs, or gallops  ABDOMEN: Soft, Nontender, Nondistended; Bowel sounds present  EXTREMITIES:  2+ Peripheral Pulses, No clubbing, cyanosis, or edema  PSYCH: AAOx3  NEUROLOGY: non-focal  SKIN: No rashes or lesions                                             12.1   9.57  )-----------( 328      ( 20 Feb 2022 06:50 )             39.0           LIVER FUNCTIONS - ( 20 Feb 2022 06:50 )  Alb: 3.8 g/dL / Pro: 6.4 g/dL / ALK PHOS: 135 U/L / ALT: 31 U/L / AST: 23 U/L / GGT: x             CAPILLARY BLOOD GLUCOSE          RADIOLOGY & ADDITIONAL TESTS:    Imaging Personally Reviewed:    Consultant(s) Notes Reviewed:      Care Discussed with Consultants/Other Providers:  
Patient is a 89y old  Female who presents with a chief complaint of     SUBJECTIVE / OVERNIGHT EVENTS: no events       T(C): 36.9 (22 @ 05:38), Max: 36.9 (22 @ 05:38)  HR: 68 (22 @ 05:38) (68 - 68)  BP: 116/73 (22 @ 05:38) (116/73 - 116/73)  RR: 18 (22 @ 05:38) (18 - 18)  SpO2: 93% (22 @ 05:38) (93% - 93%)      MEDICATIONS  (STANDING):  amLODIPine   Tablet 5 milliGRAM(s) Oral daily  lisinopril 20 milliGRAM(s) Oral daily  simvastatin 10 milliGRAM(s) Oral at bedtime    MEDICATIONS  (PRN):    PHYSICAL EXAM:  GENERAL: NAD, well-developed  HEAD:  Atraumatic, Normocephalic  EYES: EOMI, PERRLA, conjunctiva and sclera clear  NECK: Supple, No JVD  CHEST/LUNG: Clear to auscultation bilaterally; No wheeze  HEART: Regular rate and rhythm; No murmurs, rubs, or gallops  ABDOMEN: Soft, Nontender, Nondistended; Bowel sounds present  EXTREMITIES:  2+ Peripheral Pulses, No clubbing, cyanosis, or edema  PSYCH: AAOx3  NEUROLOGY: non-focal  SKIN: No rashes or lesions    LABS:                        12.7   11.20 )-----------( 370      ( 2022 11:33 )             41.3         140  |  103  |  26<H>  ----------------------------<  162<H>  4.9   |  24  |  0.73    Ca    10.0      2022 09:50  Phos  4.0       Mg     2.3         TPro  6.8  /  Alb  4.0  /  TBili  0.2  /  DBili  x   /  AST  33  /  ALT  41  /  AlkPhos  151<H>            Urinalysis Basic - ( 2022 10:39 )    Color: Light Yellow / Appearance: Clear / S.027 / pH: x  Gluc: x / Ketone: Negative  / Bili: Negative / Urobili: Negative   Blood: x / Protein: Trace / Nitrite: Positive   Leuk Esterase: Large / RBC: 1 /hpf / WBC 99 /HPF   Sq Epi: x / Non Sq Epi: 1 /hpf / Bacteria: Many        RADIOLOGY & ADDITIONAL TESTS:    Imaging Personally Reviewed:    Consultant(s) Notes Reviewed:      Care Discussed with Consultants/Other Providers:  
Patient/Caregiver provided printed discharge information.
Patient is a 89y old  Female who presents with a chief complaint of     SUBJECTIVE / OVERNIGHT EVENTS: Patient didnt  work with PT today reports ankle pain     D/C planning Time spent coordinating discharge plan 41 minutes     MEDICATIONS  (STANDING):  amLODIPine   Tablet 5 milliGRAM(s) Oral daily  cefTRIAXone   IVPB 1000 milliGRAM(s) IV Intermittent every 24 hours  lisinopril 20 milliGRAM(s) Oral daily  simvastatin 10 milliGRAM(s) Oral at bedtime    MEDICATIONS  (PRN):  acetaminophen     Tablet .. 650 milliGRAM(s) Oral every 6 hours PRN Moderate Pain (4 - 6)  ibuprofen  Tablet. 200 milliGRAM(s) Oral every 8 hours PRN Severe Pain (7 - 10)    PHYSICAL EXAM:  GENERAL: NAD, well-developed  HEAD:  Atraumatic, Normocephalic  EYES: EOMI, PERRLA, conjunctiva and sclera clear  NECK: Supple, No JVD  CHEST/LUNG: Clear to auscultation bilaterally; No wheeze  HEART: Regular rate and rhythm; No murmurs, rubs, or gallops  ABDOMEN: Soft, Nontender, Nondistended; Bowel sounds present  EXTREMITIES:  2+ Peripheral Pulses, No clubbing, cyanosis, or edema  PSYCH: AAOx3  NEUROLOGY: non-focal  SKIN: No rashes or lesions                                             12.1   9.57  )-----------( 328      ( 20 Feb 2022 06:50 )             39.0           LIVER FUNCTIONS - ( 20 Feb 2022 06:50 )  Alb: 3.8 g/dL / Pro: 6.4 g/dL / ALK PHOS: 135 U/L / ALT: 31 U/L / AST: 23 U/L / GGT: x             CAPILLARY BLOOD GLUCOSE          RADIOLOGY & ADDITIONAL TESTS:    Imaging Personally Reviewed:    Consultant(s) Notes Reviewed:      Care Discussed with Consultants/Other Providers:  
Patient is a 89y old  Female who presents with a chief complaint of     SUBJECTIVE / OVERNIGHT EVENTS: no events     T(C): 36.9 (02-20-22 @ 09:23), Max: 37 (02-20-22 @ 00:33)  HR: 74 (02-20-22 @ 09:23) (70 - 76)  BP: 113/70 (02-20-22 @ 09:23) (113/70 - 135/77)  RR: 18 (02-20-22 @ 09:23) (18 - 18)  SpO2: 95% (02-20-22 @ 09:23) (95% - 95%)    MEDICATIONS  (STANDING):  amLODIPine   Tablet 5 milliGRAM(s) Oral daily  lisinopril 20 milliGRAM(s) Oral daily  simvastatin 10 milliGRAM(s) Oral at bedtime    MEDICATIONS  (PRN):  PHYSICAL EXAM:  GENERAL: NAD, well-developed  HEAD:  Atraumatic, Normocephalic  EYES: EOMI, PERRLA, conjunctiva and sclera clear  NECK: Supple, No JVD  CHEST/LUNG: Clear to auscultation bilaterally; No wheeze  HEART: Regular rate and rhythm; No murmurs, rubs, or gallops  ABDOMEN: Soft, Nontender, Nondistended; Bowel sounds present  EXTREMITIES:  2+ Peripheral Pulses, No clubbing, cyanosis, or edema  PSYCH: AAOx3  NEUROLOGY: non-focal  SKIN: No rashes or lesions                          12.1   9.57  )-----------( 328      ( 20 Feb 2022 06:50 )             39.0           LIVER FUNCTIONS - ( 20 Feb 2022 06:50 )  Alb: 3.8 g/dL / Pro: 6.4 g/dL / ALK PHOS: 135 U/L / ALT: 31 U/L / AST: 23 U/L / GGT: x             142|106|26<89  4.6|23|0.82  9.2,--,--  02-20 @ 06:50        RADIOLOGY & ADDITIONAL TESTS:    Imaging Personally Reviewed:    Consultant(s) Notes Reviewed:      Care Discussed with Consultants/Other Providers:  
Patient is a 89y old  Female who presents with a chief complaint of     SUBJECTIVE / OVERNIGHT EVENTS: Patient didnt  work with PT today reports ankle pain       T(C): 36.9 (02-21-22 @ 19:49), Max: 36.9 (02-21-22 @ 19:49)  HR: 68 (02-21-22 @ 19:49) (68 - 80)  BP: 126/72 (02-21-22 @ 19:49) (112/71 - 150/83)  RR: 18 (02-21-22 @ 19:49) (18 - 18)  SpO2: 96% (02-21-22 @ 19:49) (94% - 98%)    MEDICATIONS  (STANDING):  amLODIPine   Tablet 5 milliGRAM(s) Oral daily  cefTRIAXone   IVPB 1000 milliGRAM(s) IV Intermittent every 24 hours  lisinopril 20 milliGRAM(s) Oral daily  simvastatin 10 milliGRAM(s) Oral at bedtime    MEDICATIONS  (PRN):  acetaminophen     Tablet .. 650 milliGRAM(s) Oral every 6 hours PRN Moderate Pain (4 - 6)  ibuprofen  Tablet. 200 milliGRAM(s) Oral every 8 hours PRN Severe Pain (7 - 10)    PHYSICAL EXAM:  GENERAL: NAD, well-developed  HEAD:  Atraumatic, Normocephalic  EYES: EOMI, PERRLA, conjunctiva and sclera clear  NECK: Supple, No JVD  CHEST/LUNG: Clear to auscultation bilaterally; No wheeze  HEART: Regular rate and rhythm; No murmurs, rubs, or gallops  ABDOMEN: Soft, Nontender, Nondistended; Bowel sounds present  EXTREMITIES:  2+ Peripheral Pulses, No clubbing, cyanosis, or edema  PSYCH: AAOx3  NEUROLOGY: non-focal  SKIN: No rashes or lesions                                             12.1   9.57  )-----------( 328      ( 20 Feb 2022 06:50 )             39.0           LIVER FUNCTIONS - ( 20 Feb 2022 06:50 )  Alb: 3.8 g/dL / Pro: 6.4 g/dL / ALK PHOS: 135 U/L / ALT: 31 U/L / AST: 23 U/L / GGT: x             CAPILLARY BLOOD GLUCOSE          RADIOLOGY & ADDITIONAL TESTS:    Imaging Personally Reviewed:    Consultant(s) Notes Reviewed:      Care Discussed with Consultants/Other Providers:  
Admission

## 2024-08-22 NOTE — CONSULT NOTE ADULT - REASON FOR ADMISSION
Symptomatic anemia secondary to suspected upper GI bleed
Symptomatic anemia secondary to suspected upper GI bleed

## 2024-08-22 NOTE — CONSULT NOTE ADULT - NS ATTEND AMEND GEN_ALL_CORE FT
Pt w/ gastric adenoCA, currently undergoing neoadjuvant chemo, planned for gastric resection at [facility] after completion of Neoadjchemo. H/h responsive to transfusion, otherwise hemodynamically stable, asymptomatic since admission. Pt tolerating CLD. Will monitor closely, transfuse as needed.

## 2024-08-22 NOTE — CONSULT NOTE ADULT - ASSESSMENT
48 Y/O male with recent diagnosis of gastric cancer on FLOT admitted to the hospital for UGIB 2/2 gastric adenocarcinoma. Diagnosed at [facility] in July 2024 with endoscopy revealing ulcerated gastric mass with pathology revealing adenocarcinoma. Patient followed by Medical Onoclogist Dr. Howard and Surgical Oncologist Dr. Carrasco at [facility]. Recently underwent diagnostic laparoscopy 7/31/2024 to assess for mets and sent a washout cytology that was atypical with no conclusive evidence of metastasis. Underwent his first neoadjuvant chemotherapy session last Wednesday to which he experienced a general malaise for 3 days. His next scheduled appointment is for 8/28/2024. He is currently planning to undergo 2 months of neoadjuvant chemotherapy twice a month, undergo surgical resection, and continue adjuvant chemotherapy.  H/H improved s/p 1u PRBC. Currently receiving another unit of PRBC at bedside.   Afebrile with significant leukocytosis.       PLAN:     - Trend H/H; monitor leukocytosis   - Consider transfusion for hgb <7   - Recommend F/U with Medical and Surgical Oncologists   - Appreciate gastroenterology recommendations   - Continue care per primary team   - Will continue to follow  - Discussed with Dr. Hamilton

## 2024-08-22 NOTE — PATIENT PROFILE ADULT - FALL HARM RISK - HARM RISK INTERVENTIONS

## 2024-08-23 LAB
BASOPHILS # BLD AUTO: 0.03 K/UL — SIGNIFICANT CHANGE UP (ref 0–0.2)
BASOPHILS # BLD AUTO: 0.04 K/UL — SIGNIFICANT CHANGE UP (ref 0–0.2)
BASOPHILS NFR BLD AUTO: 0.1 % — SIGNIFICANT CHANGE UP (ref 0–2)
BASOPHILS NFR BLD AUTO: 0.1 % — SIGNIFICANT CHANGE UP (ref 0–2)
EOSINOPHIL # BLD AUTO: 0.13 K/UL — SIGNIFICANT CHANGE UP (ref 0–0.5)
EOSINOPHIL # BLD AUTO: 0.13 K/UL — SIGNIFICANT CHANGE UP (ref 0–0.5)
EOSINOPHIL NFR BLD AUTO: 0.4 % — SIGNIFICANT CHANGE UP (ref 0–6)
EOSINOPHIL NFR BLD AUTO: 0.4 % — SIGNIFICANT CHANGE UP (ref 0–6)
GLUCOSE BLDC GLUCOMTR-MCNC: 143 MG/DL — HIGH (ref 70–99)
GLUCOSE BLDC GLUCOMTR-MCNC: 185 MG/DL — HIGH (ref 70–99)
GLUCOSE BLDC GLUCOMTR-MCNC: 196 MG/DL — HIGH (ref 70–99)
GLUCOSE BLDC GLUCOMTR-MCNC: 215 MG/DL — HIGH (ref 70–99)
HCT VFR BLD CALC: 27.3 % — LOW (ref 39–50)
HCT VFR BLD CALC: 27.7 % — LOW (ref 39–50)
HGB BLD-MCNC: 8.6 G/DL — LOW (ref 13–17)
HGB BLD-MCNC: 9.1 G/DL — LOW (ref 13–17)
IMM GRANULOCYTES NFR BLD AUTO: 10.7 % — HIGH (ref 0–0.9)
IMM GRANULOCYTES NFR BLD AUTO: 12.2 % — HIGH (ref 0–0.9)
LYMPHOCYTES # BLD AUTO: 2.44 K/UL — SIGNIFICANT CHANGE UP (ref 1–3.3)
LYMPHOCYTES # BLD AUTO: 3.42 K/UL — HIGH (ref 1–3.3)
LYMPHOCYTES # BLD AUTO: 7.3 % — LOW (ref 13–44)
LYMPHOCYTES # BLD AUTO: 9.3 % — LOW (ref 13–44)
MCHC RBC-ENTMCNC: 26 PG — LOW (ref 27–34)
MCHC RBC-ENTMCNC: 26.3 PG — LOW (ref 27–34)
MCHC RBC-ENTMCNC: 31.5 G/DL — LOW (ref 32–36)
MCHC RBC-ENTMCNC: 32.9 G/DL — SIGNIFICANT CHANGE UP (ref 32–36)
MCV RBC AUTO: 80.1 FL — SIGNIFICANT CHANGE UP (ref 80–100)
MCV RBC AUTO: 82.5 FL — SIGNIFICANT CHANGE UP (ref 80–100)
MONOCYTES # BLD AUTO: 2.2 K/UL — HIGH (ref 0–0.9)
MONOCYTES # BLD AUTO: 2.81 K/UL — HIGH (ref 0–0.9)
MONOCYTES NFR BLD AUTO: 6.6 % — SIGNIFICANT CHANGE UP (ref 2–14)
MONOCYTES NFR BLD AUTO: 7.7 % — SIGNIFICANT CHANGE UP (ref 2–14)
NEUTROPHILS # BLD AUTO: 25.14 K/UL — HIGH (ref 1.8–7.4)
NEUTROPHILS # BLD AUTO: 25.8 K/UL — HIGH (ref 1.8–7.4)
NEUTROPHILS NFR BLD AUTO: 70.3 % — SIGNIFICANT CHANGE UP (ref 43–77)
NEUTROPHILS NFR BLD AUTO: 74.9 % — SIGNIFICANT CHANGE UP (ref 43–77)
NRBC # BLD: 0 /100 WBCS — SIGNIFICANT CHANGE UP (ref 0–0)
NRBC # BLD: 0 /100 WBCS — SIGNIFICANT CHANGE UP (ref 0–0)
PLATELET # BLD AUTO: 173 K/UL — SIGNIFICANT CHANGE UP (ref 150–400)
PLATELET # BLD AUTO: 176 K/UL — SIGNIFICANT CHANGE UP (ref 150–400)
RBC # BLD: 3.31 M/UL — LOW (ref 4.2–5.8)
RBC # BLD: 3.46 M/UL — LOW (ref 4.2–5.8)
RBC # FLD: 24.4 % — HIGH (ref 10.3–14.5)
RBC # FLD: 24.7 % — HIGH (ref 10.3–14.5)
WBC # BLD: 33.52 K/UL — HIGH (ref 3.8–10.5)
WBC # BLD: 36.66 K/UL — HIGH (ref 3.8–10.5)
WBC # FLD AUTO: 33.52 K/UL — HIGH (ref 3.8–10.5)
WBC # FLD AUTO: 36.66 K/UL — HIGH (ref 3.8–10.5)

## 2024-08-23 PROCEDURE — 99232 SBSQ HOSP IP/OBS MODERATE 35: CPT

## 2024-08-23 RX ADMIN — SUCRALFATE 1 GRAM(S): 1 SUSPENSION ORAL at 05:54

## 2024-08-23 RX ADMIN — SUCRALFATE 1 GRAM(S): 1 SUSPENSION ORAL at 18:02

## 2024-08-23 RX ADMIN — Medication 2: at 13:08

## 2024-08-23 RX ADMIN — Medication 10 MG/HR: at 22:02

## 2024-08-23 RX ADMIN — Medication 10 MG/HR: at 13:06

## 2024-08-23 RX ADMIN — ONDANSETRON 4 MILLIGRAM(S): 2 INJECTION, SOLUTION INTRAMUSCULAR; INTRAVENOUS at 01:05

## 2024-08-23 RX ADMIN — SUCRALFATE 1 GRAM(S): 1 SUSPENSION ORAL at 13:06

## 2024-08-23 RX ADMIN — Medication 1: at 18:00

## 2024-08-23 RX ADMIN — SUCRALFATE 1 GRAM(S): 1 SUSPENSION ORAL at 23:44

## 2024-08-23 NOTE — DIETITIAN INITIAL EVALUATION ADULT - OTHER INFO
Pt seen on medical floor, adm w/ symptomatic anemia 2/2 suspected GI bleed. Leukocytosis ; HTN ; Pre DM - 7/10 - A1c = 5.9 % ). Pt lives w/ wife ( currently at bedside) . They both were doing food shopping / cooking PTA. Pt reports weight loss x 1 week after beginning chemo tx 1 week ago for gastric CA. Denies N/V/D/C/Chewing/Swallowing issues (+) Food Allergies - shellfish. No c/o at this time. Encouraged High Calorie High Protein diet.

## 2024-08-23 NOTE — DIETITIAN INITIAL EVALUATION ADULT - PERTINENT MEDS FT
MEDICATIONS  (STANDING):  dextrose 5%. 1000 milliLiter(s) (100 mL/Hr) IV Continuous <Continuous>  dextrose 5%. 1000 milliLiter(s) (50 mL/Hr) IV Continuous <Continuous>  dextrose 50% Injectable 25 Gram(s) IV Push once  dextrose 50% Injectable 25 Gram(s) IV Push once  dextrose 50% Injectable 12.5 Gram(s) IV Push once  glucagon  Injectable 1 milliGRAM(s) IntraMuscular once  insulin lispro (ADMELOG) corrective regimen sliding scale   SubCutaneous three times a day before meals  insulin lispro (ADMELOG) corrective regimen sliding scale   SubCutaneous at bedtime  lactated ringers. 1000 milliLiter(s) (100 mL/Hr) IV Continuous <Continuous>  pantoprazole Infusion 8 mG/Hr (10 mL/Hr) IV Continuous <Continuous>  sucralfate suspension 1 Gram(s) Oral four times a day    MEDICATIONS  (PRN):  acetaminophen     Tablet .. 650 milliGRAM(s) Oral every 6 hours PRN Temp greater or equal to 38C (100.4F), Mild Pain (1 - 3)  aluminum hydroxide/magnesium hydroxide/simethicone Suspension 30 milliLiter(s) Oral every 4 hours PRN Dyspepsia  dextrose Oral Gel 15 Gram(s) Oral once PRN Blood Glucose LESS THAN 70 milliGRAM(s)/deciliter  melatonin 3 milliGRAM(s) Oral at bedtime PRN Insomnia  ondansetron Injectable 4 milliGRAM(s) IV Push every 8 hours PRN Nausea and/or Vomiting

## 2024-08-23 NOTE — DIETITIAN INITIAL EVALUATION ADULT - ENTER TO (ML/KG)
Refill levothyroxine  Last seen: 9/13/2019  Follow up: 9/14/2020  Last filled 6/23/2020 #90 with no refills  Refill declined since rx auth was sent to pharmacy on 6/23/2020 for 90 days     30

## 2024-08-23 NOTE — DIETITIAN INITIAL EVALUATION ADULT - PERTINENT LABORATORY DATA
08-21    133<L>  |  96  |  31<H>  ----------------------------<  286<H>  4.8   |  28  |  1.27    Ca    10.3<H>      21 Aug 2024 14:50    TPro  6.0  /  Alb  3.2<L>  /  TBili  0.4  /  DBili  x   /  AST  13<L>  /  ALT  23  /  AlkPhos  87  08-21  POCT Blood Glucose.: 215 mg/dL (08-23-24 @ 11:30)

## 2024-08-23 NOTE — DIETITIAN INITIAL EVALUATION ADULT - PERSON TAUGHT/METHOD
verbal instruction/teach back - (Patient repeats in own words)/ask me 3/patient instructed/spouse instructed

## 2024-08-24 LAB
ALBUMIN SERPL ELPH-MCNC: 2.3 G/DL — LOW (ref 3.3–5)
ALP SERPL-CCNC: 79 U/L — SIGNIFICANT CHANGE UP (ref 40–120)
ALT FLD-CCNC: 16 U/L — SIGNIFICANT CHANGE UP (ref 12–78)
ANION GAP SERPL CALC-SCNC: 5 MMOL/L — SIGNIFICANT CHANGE UP (ref 5–17)
AST SERPL-CCNC: 9 U/L — LOW (ref 15–37)
BILIRUB SERPL-MCNC: 0.2 MG/DL — SIGNIFICANT CHANGE UP (ref 0.2–1.2)
BLD GP AB SCN SERPL QL: SIGNIFICANT CHANGE UP
BUN SERPL-MCNC: 16 MG/DL — SIGNIFICANT CHANGE UP (ref 7–23)
CALCIUM SERPL-MCNC: 8.2 MG/DL — LOW (ref 8.5–10.1)
CHLORIDE SERPL-SCNC: 106 MMOL/L — SIGNIFICANT CHANGE UP (ref 96–108)
CO2 SERPL-SCNC: 28 MMOL/L — SIGNIFICANT CHANGE UP (ref 22–31)
CREAT SERPL-MCNC: 0.81 MG/DL — SIGNIFICANT CHANGE UP (ref 0.5–1.3)
EGFR: 107 ML/MIN/1.73M2 — SIGNIFICANT CHANGE UP
GLUCOSE BLDC GLUCOMTR-MCNC: 144 MG/DL — HIGH (ref 70–99)
GLUCOSE BLDC GLUCOMTR-MCNC: 145 MG/DL — HIGH (ref 70–99)
GLUCOSE BLDC GLUCOMTR-MCNC: 152 MG/DL — HIGH (ref 70–99)
GLUCOSE BLDC GLUCOMTR-MCNC: 181 MG/DL — HIGH (ref 70–99)
GLUCOSE SERPL-MCNC: 144 MG/DL — HIGH (ref 70–99)
HCT VFR BLD CALC: 23.2 % — LOW (ref 39–50)
HCT VFR BLD CALC: 26.8 % — LOW (ref 39–50)
HGB BLD-MCNC: 7.3 G/DL — LOW (ref 13–17)
HGB BLD-MCNC: 8.7 G/DL — LOW (ref 13–17)
MCHC RBC-ENTMCNC: 26.6 PG — LOW (ref 27–34)
MCHC RBC-ENTMCNC: 27.2 PG — SIGNIFICANT CHANGE UP (ref 27–34)
MCHC RBC-ENTMCNC: 31.5 G/DL — LOW (ref 32–36)
MCHC RBC-ENTMCNC: 32.5 G/DL — SIGNIFICANT CHANGE UP (ref 32–36)
MCV RBC AUTO: 83.8 FL — SIGNIFICANT CHANGE UP (ref 80–100)
MCV RBC AUTO: 84.7 FL — SIGNIFICANT CHANGE UP (ref 80–100)
NRBC # BLD: 0 /100 WBCS — SIGNIFICANT CHANGE UP (ref 0–0)
NRBC # BLD: 1 /100 WBCS — HIGH (ref 0–0)
PLATELET # BLD AUTO: 183 K/UL — SIGNIFICANT CHANGE UP (ref 150–400)
PLATELET # BLD AUTO: 196 K/UL — SIGNIFICANT CHANGE UP (ref 150–400)
POTASSIUM SERPL-MCNC: 4.1 MMOL/L — SIGNIFICANT CHANGE UP (ref 3.5–5.3)
POTASSIUM SERPL-SCNC: 4.1 MMOL/L — SIGNIFICANT CHANGE UP (ref 3.5–5.3)
PROT SERPL-MCNC: 4.5 GM/DL — LOW (ref 6–8.3)
RBC # BLD: 2.74 M/UL — LOW (ref 4.2–5.8)
RBC # BLD: 3.2 M/UL — LOW (ref 4.2–5.8)
RBC # FLD: 24 % — HIGH (ref 10.3–14.5)
RBC # FLD: 26 % — HIGH (ref 10.3–14.5)
SODIUM SERPL-SCNC: 139 MMOL/L — SIGNIFICANT CHANGE UP (ref 135–145)
WBC # BLD: 29.28 K/UL — HIGH (ref 3.8–10.5)
WBC # BLD: 36.09 K/UL — HIGH (ref 3.8–10.5)
WBC # FLD AUTO: 29.28 K/UL — HIGH (ref 3.8–10.5)
WBC # FLD AUTO: 36.09 K/UL — HIGH (ref 3.8–10.5)

## 2024-08-24 PROCEDURE — 99232 SBSQ HOSP IP/OBS MODERATE 35: CPT

## 2024-08-24 RX ADMIN — SUCRALFATE 1 GRAM(S): 1 SUSPENSION ORAL at 18:17

## 2024-08-24 RX ADMIN — SUCRALFATE 1 GRAM(S): 1 SUSPENSION ORAL at 12:28

## 2024-08-24 RX ADMIN — Medication 10 MG/HR: at 11:24

## 2024-08-24 RX ADMIN — Medication 1: at 11:50

## 2024-08-24 RX ADMIN — Medication 10 MG/HR: at 22:11

## 2024-08-24 RX ADMIN — Medication 10 MILLIGRAM(S): at 18:24

## 2024-08-24 RX ADMIN — SUCRALFATE 1 GRAM(S): 1 SUSPENSION ORAL at 23:29

## 2024-08-24 RX ADMIN — Medication 10 MG/HR: at 06:32

## 2024-08-24 RX ADMIN — SUCRALFATE 1 GRAM(S): 1 SUSPENSION ORAL at 05:44

## 2024-08-24 RX ADMIN — Medication 10 MG/HR: at 19:34

## 2024-08-25 LAB
ALBUMIN SERPL ELPH-MCNC: 2.4 G/DL — LOW (ref 3.3–5)
ALP SERPL-CCNC: 97 U/L — SIGNIFICANT CHANGE UP (ref 40–120)
ALT FLD-CCNC: 27 U/L — SIGNIFICANT CHANGE UP (ref 12–78)
ANION GAP SERPL CALC-SCNC: 5 MMOL/L — SIGNIFICANT CHANGE UP (ref 5–17)
AST SERPL-CCNC: 19 U/L — SIGNIFICANT CHANGE UP (ref 15–37)
BILIRUB SERPL-MCNC: 0.3 MG/DL — SIGNIFICANT CHANGE UP (ref 0.2–1.2)
BUN SERPL-MCNC: 12 MG/DL — SIGNIFICANT CHANGE UP (ref 7–23)
CALCIUM SERPL-MCNC: 8.4 MG/DL — LOW (ref 8.5–10.1)
CHLORIDE SERPL-SCNC: 108 MMOL/L — SIGNIFICANT CHANGE UP (ref 96–108)
CO2 SERPL-SCNC: 26 MMOL/L — SIGNIFICANT CHANGE UP (ref 22–31)
CREAT SERPL-MCNC: 0.77 MG/DL — SIGNIFICANT CHANGE UP (ref 0.5–1.3)
EGFR: 108 ML/MIN/1.73M2 — SIGNIFICANT CHANGE UP
GLUCOSE BLDC GLUCOMTR-MCNC: 143 MG/DL — HIGH (ref 70–99)
GLUCOSE BLDC GLUCOMTR-MCNC: 145 MG/DL — HIGH (ref 70–99)
GLUCOSE BLDC GLUCOMTR-MCNC: 168 MG/DL — HIGH (ref 70–99)
GLUCOSE BLDC GLUCOMTR-MCNC: 170 MG/DL — HIGH (ref 70–99)
GLUCOSE SERPL-MCNC: 124 MG/DL — HIGH (ref 70–99)
HCT VFR BLD CALC: 24.7 % — LOW (ref 39–50)
HCT VFR BLD CALC: 25.3 % — LOW (ref 39–50)
HGB BLD-MCNC: 7.9 G/DL — LOW (ref 13–17)
HGB BLD-MCNC: 7.9 G/DL — LOW (ref 13–17)
MCHC RBC-ENTMCNC: 26.7 PG — LOW (ref 27–34)
MCHC RBC-ENTMCNC: 27.3 PG — SIGNIFICANT CHANGE UP (ref 27–34)
MCHC RBC-ENTMCNC: 31.2 G/DL — LOW (ref 32–36)
MCHC RBC-ENTMCNC: 32 G/DL — SIGNIFICANT CHANGE UP (ref 32–36)
MCV RBC AUTO: 85.5 FL — SIGNIFICANT CHANGE UP (ref 80–100)
MCV RBC AUTO: 85.5 FL — SIGNIFICANT CHANGE UP (ref 80–100)
NRBC # BLD: 0 /100 WBCS — SIGNIFICANT CHANGE UP (ref 0–0)
NRBC # BLD: 0 /100 WBCS — SIGNIFICANT CHANGE UP (ref 0–0)
PLATELET # BLD AUTO: 178 K/UL — SIGNIFICANT CHANGE UP (ref 150–400)
PLATELET # BLD AUTO: 193 K/UL — SIGNIFICANT CHANGE UP (ref 150–400)
POTASSIUM SERPL-MCNC: 4 MMOL/L — SIGNIFICANT CHANGE UP (ref 3.5–5.3)
POTASSIUM SERPL-SCNC: 4 MMOL/L — SIGNIFICANT CHANGE UP (ref 3.5–5.3)
PROT SERPL-MCNC: 4.7 GM/DL — LOW (ref 6–8.3)
RBC # BLD: 2.89 M/UL — LOW (ref 4.2–5.8)
RBC # BLD: 2.96 M/UL — LOW (ref 4.2–5.8)
RBC # FLD: 24.3 % — HIGH (ref 10.3–14.5)
RBC # FLD: 24.5 % — HIGH (ref 10.3–14.5)
SODIUM SERPL-SCNC: 139 MMOL/L — SIGNIFICANT CHANGE UP (ref 135–145)
WBC # BLD: 25.93 K/UL — HIGH (ref 3.8–10.5)
WBC # BLD: 26.95 K/UL — HIGH (ref 3.8–10.5)
WBC # FLD AUTO: 25.93 K/UL — HIGH (ref 3.8–10.5)
WBC # FLD AUTO: 26.95 K/UL — HIGH (ref 3.8–10.5)

## 2024-08-25 PROCEDURE — 99232 SBSQ HOSP IP/OBS MODERATE 35: CPT

## 2024-08-25 RX ADMIN — SUCRALFATE 1 GRAM(S): 1 SUSPENSION ORAL at 17:38

## 2024-08-25 RX ADMIN — Medication 10 MG/HR: at 19:25

## 2024-08-25 RX ADMIN — Medication 10 MG/HR: at 17:42

## 2024-08-25 RX ADMIN — Medication 10 MG/HR: at 08:45

## 2024-08-25 RX ADMIN — SUCRALFATE 1 GRAM(S): 1 SUSPENSION ORAL at 05:38

## 2024-08-25 RX ADMIN — SUCRALFATE 1 GRAM(S): 1 SUSPENSION ORAL at 23:13

## 2024-08-25 RX ADMIN — SUCRALFATE 1 GRAM(S): 1 SUSPENSION ORAL at 12:21

## 2024-08-25 NOTE — PROGRESS NOTE ADULT - PROVIDER SPECIALTY LIST ADULT
Gastroenterology
Gastroenterology
Hospitalist
Hospitalist
Surgery
Gastroenterology
Gastroenterology
Hospitalist
Hospitalist

## 2024-08-25 NOTE — PROGRESS NOTE ADULT - REASON FOR ADMISSION
Symptomatic anemia secondary to suspected upper GI bleed

## 2024-08-25 NOTE — PROGRESS NOTE ADULT - ASSESSMENT
Isrrael Cortez is a 49 year old male with PMHx of HTN, prediabetes, and gastric CA (on neoadjuvant FLOT chemotherapy, received first session on 8/14/24) who presented to the ED on 8/21/24 for complaints of vomiting blood and passing out and admitted for symptomatic anemia secondary to suspected upper GI bleed.    Symptomatic anemia and syncope secondary to suspected upper GI bleed  In pt with recent diagnosis of gastric CA  Complaints of fatigue yesterday, "winded" while ambulating up the stairs today  Felt nauseous and lightheaded while in bathroom today, vomited up coffee ground emesis in bathroom sink and then fell onto floor  Wife states patient found on the floor, face down, projectile vomiting copious amounts of coffee ground emesis  Presumed associated head hit and LOC, total duration of episode ~2 minutes  Hgb 10.9 on admission, unclear baseline but hgb 10.6 (on 8/14/24)  CT head without acute intracranial findings  CTA A/P without active contrast extravasation into the GI tract to suspect active ongoing bleeding at the time of scanning but with gastric wall thickening and edema in the gastric antrum and severely distended fluid-filled stomach with decompressed duodenum which may relate to gastric outlet obstruction versus gastroparesis  S/p 2L NS bolus, ondansetron 4 mg IV, and pantoprazole 80 mg IV in the ED  IVF with  cc/hr started, continue pantoprazole 8 mg/hr   Clear liquid diet for now, NPO at midnight in case of GI intervention, hold DVT ppx  F/u repeat CBC tonight and in AM, transfuse for hgb < 7  Will hold off on echocardiogram given clear etiology of syncope is likely due to blood loss  Telemetry to monitor for arrhythmias  - Appreciate GI recs  - Heme/onc and surgery c/s placed  - Discussed with patient's outpatient oncologist, Dr. Marisol Pereira at [facility]. No indication for transfer   - H/H downtrending. Will transfuse 1U PRBC. No acute GI bleed overnight.    Lactic acidosis, resolved  Lactic acid 2.8 on admission  S/p 2L NS bolus in the ED    Leukocytosis, likely reactive to steroid administration  Currently received dexamethasone 4 mg 2 tabs BID day before, during, and after chemotherapy as per oncology notes  Afebrile, no signs of infection  Monitor WBC trend    Chronic medical conditions:   HTN: previously on losartan but no longer takes it  Prediabetes with hyperglycemia: last known A1c 5.9 (on 7/10/24), blood glucose 286 on admission, POC qac and qhs, low dose SSI qac started, blood glucose goal < 180    Dispo- Pending stable H/H
Isrrael Cortez is a 49 year old male with PMHx of HTN, prediabetes, and gastric CA (on neoadjuvant FLOT chemotherapy, received first session on 8/14/24) who presented to the ED on 8/21/24 for complaints of vomiting blood and passing out and admitted for symptomatic anemia secondary to suspected upper GI bleed.    Symptomatic anemia and syncope secondary to suspected upper GI bleed  In pt with recent diagnosis of gastric CA  Complaints of fatigue yesterday, "winded" while ambulating up the stairs today  Felt nauseous and lightheaded while in bathroom today, vomited up coffee ground emesis in bathroom sink and then fell onto floor  Wife states patient found on the floor, face down, projectile vomiting copious amounts of coffee ground emesis  Presumed associated head hit and LOC, total duration of episode ~2 minutes  Hgb 10.9 on admission, unclear baseline but hgb 10.6 (on 8/14/24)  CT head without acute intracranial findings  CTA A/P without active contrast extravasation into the GI tract to suspect active ongoing bleeding at the time of scanning but with gastric wall thickening and edema in the gastric antrum and severely distended fluid-filled stomach with decompressed duodenum which may relate to gastric outlet obstruction versus gastroparesis  S/p 2L NS bolus, ondansetron 4 mg IV, and pantoprazole 80 mg IV in the ED  IVF with  cc/hr started, continue pantoprazole 8 mg/hr   Clear liquid diet for now, NPO at midnight in case of GI intervention, hold DVT ppx  F/u repeat CBC tonight and in AM, transfuse for hgb < 7  Will hold off on echocardiogram given clear etiology of syncope is likely due to blood loss  Telemetry to monitor for arrhythmias  Appreciate GI recs- cbc q6h, liquid diet  - Heme/onc and surgery c/s placed  - Discussed with patient's outpatient oncologist, Dr. Marisol Pereira at [facility]. No indication for transfer   -H/H stable s/p transfusion    Lactic acidosis, resolved  Lactic acid 2.8 on admission  S/p 2L NS bolus in the ED    Leukocytosis, likely reactive to steroid administration  Currently received dexamethasone 4 mg 2 tabs BID day before, during, and after chemotherapy as per oncology notes  Afebrile, no signs of infection  Monitor WBC trend    Chronic medical conditions:   HTN: previously on losartan but no longer takes it  Prediabetes with hyperglycemia: last known A1c 5.9 (on 7/10/24), blood glucose 286 on admission, POC qac and qhs, low dose SSI qac started, blood glucose goal < 180    Dispo- Pending ability to tolerate DASH diet and resolution of n/v
Isrrael Cortez is a 49 year old male with PMHx of HTN, prediabetes, and gastric CA (on neoadjuvant FLOT chemotherapy, received first session on 8/14/24) who presented to the ED on 8/21/24 for complaints of vomiting blood and passing out and admitted for symptomatic anemia secondary to suspected upper GI bleed.    Symptomatic anemia and syncope secondary to suspected upper GI bleed  In pt with recent diagnosis of gastric CA  Complaints of fatigue yesterday, "winded" while ambulating up the stairs today  Felt nauseous and lightheaded while in bathroom today, vomited up coffee ground emesis in bathroom sink and then fell onto floor  Wife states patient found on the floor, face down, projectile vomiting copious amounts of coffee ground emesis  Presumed associated head hit and LOC, total duration of episode ~2 minutes  Hgb 10.9 on admission, unclear baseline but hgb 10.6 (on 8/14/24)  CT head without acute intracranial findings  CTA A/P without active contrast extravasation into the GI tract to suspect active ongoing bleeding at the time of scanning but with gastric wall thickening and edema in the gastric antrum and severely distended fluid-filled stomach with decompressed duodenum which may relate to gastric outlet obstruction versus gastroparesis  S/p 2L NS bolus, ondansetron 4 mg IV, and pantoprazole 80 mg IV in the ED  IVF with  cc/hr started, continue pantoprazole 8 mg/hr   Clear liquid diet for now, NPO at midnight in case of GI intervention, hold DVT ppx  F/u repeat CBC tonight and in AM, transfuse for hgb < 7  Will hold off on echocardiogram given clear etiology of syncope is likely due to blood loss  Telemetry to monitor for arrhythmias  - Appreciate GI recs  - Heme/onc and surgery c/s placed  - Discussed with patient's outpatient oncologist, Dr. Marisol Pereira at [facility]. No indication for transfer   - S/p 1U PRBC on 8/24.   - Appreciate GI recs- will monitor H/H tomorrow    Lactic acidosis, resolved  Lactic acid 2.8 on admission  S/p 2L NS bolus in the ED    Leukocytosis, likely reactive to steroid administration  Currently received dexamethasone 4 mg 2 tabs BID day before, during, and after chemotherapy as per oncology notes  Afebrile, no signs of infection  Monitor WBC trend    Chronic medical conditions:   HTN: previously on losartan but no longer takes it  Prediabetes with hyperglycemia: last known A1c 5.9 (on 7/10/24), blood glucose 286 on admission, POC qac and qhs, low dose SSI qac started, blood glucose goal < 180    Dispo- Pending stable H/H. Likely discharge tomorrow
48 Y/O male admitted with UGIB 2/2 gastric adenocarcinoma. H/H improved s/p 2u PRBC on 8/23. Overnight, patient had 2 episodes of coffee ground emesis. Patient tolerating clear liquid diet. Currently resting comfortably.   Afebrile with significant leukocytosis.   H/H currently stable. Recommend continued monitoring of H/H.   Currently receiving protonix and carafate.   Patient and wife, Cora, have updated primary oncology team Dr. Howard and Danilo.       PLAN:     - Trend H/H; monitor leukocytosis   - Consider transfusion for hgb <7   - Appreciate gastroenterology recommendations   - Continue care per primary team   - Discussed with Dr. Hamilton 
Isrrael Cortez is a 49 year old male with PMHx of HTN, prediabetes, and gastric CA (on neoadjuvant FLOT chemotherapy, received first session on 8/14/24) who presented to the ED on 8/21/24 for complaints of vomiting blood and passing out and admitted for symptomatic anemia secondary to suspected upper GI bleed.    Symptomatic anemia and syncope secondary to suspected upper GI bleed  In pt with recent diagnosis of gastric CA  Complaints of fatigue yesterday, "winded" while ambulating up the stairs today  Felt nauseous and lightheaded while in bathroom today, vomited up coffee ground emesis in bathroom sink and then fell onto floor  Wife states patient found on the floor, face down, projectile vomiting copious amounts of coffee ground emesis  Presumed associated head hit and LOC, total duration of episode ~2 minutes  Hgb 10.9 on admission, unclear baseline but hgb 10.6 (on 8/14/24)  CT head without acute intracranial findings  CTA A/P without active contrast extravasation into the GI tract to suspect active ongoing bleeding at the time of scanning but with gastric wall thickening and edema in the gastric antrum and severely distended fluid-filled stomach with decompressed duodenum which may relate to gastric outlet obstruction versus gastroparesis  S/p 2L NS bolus, ondansetron 4 mg IV, and pantoprazole 80 mg IV in the ED  IVF with  cc/hr started, continue pantoprazole 8 mg/hr   Clear liquid diet for now, NPO at midnight in case of GI intervention, hold DVT ppx  F/u repeat CBC tonight and in AM, transfuse for hgb < 7  Will hold off on echocardiogram given clear etiology of syncope is likely due to blood loss  Telemetry to monitor for arrhythmias  Appreciate GI recs- cbc q6h, liquid diet  - Heme/onc and surgery c/s placed  - Discussed with patient's outpatient oncologist, Dr. Marisol Pereira at [facility]. No indication for transfer   - Will transfuse 1U PRBC to maintain Hg< 7    Lactic acidosis, resolved  Lactic acid 2.8 on admission  S/p 2L NS bolus in the ED    Leukocytosis, likely reactive to steroid administration  Currently received dexamethasone 4 mg 2 tabs BID day before, during, and after chemotherapy as per oncology notes  Afebrile, no signs of infection  Monitor WBC trend    Chronic medical conditions:   HTN: previously on losartan but no longer takes it  Prediabetes with hyperglycemia: last known A1c 5.9 (on 7/10/24), blood glucose 286 on admission, POC qac and qhs, low dose SSI qac started, blood glucose goal < 180    Plan of care discussed with wife at bedside.

## 2024-08-25 NOTE — PROGRESS NOTE ADULT - SUBJECTIVE AND OBJECTIVE BOX
HPI:  Isrrael Cortez is a 49 year old male with PMHx of HTN, prediabetes, and gastric CA (on neoadjuvant FLOT chemotherapy, received first session on 8/14/24) who presented to the ED on 8/21/24 for complaints of vomiting blood and passing out.    Patient reports he was extremely tired yesterday but attributed it to recent initiation of chemotherapy. Wife called oncology who advised her to monitor. This morning, patient went downstairs for a snack and then became "winded" while ambulating back up the stairs to his bedroom. He laid down in bed and then went to the bathroom. Felt nauseous and lightheaded. Vomited up coffee ground emesis in the bathroom sink. Then he remembers falling onto the floor and hitting his right bottom lip. Wife was downstairs when she heard a thud which prompted her to come upstairs. Found patient on the floor, face down, projectile vomiting copious amounts of coffee ground emesis. Total duration of unconsciousness is estimated to be about two minutes. Brought to the ER for further evaluation.     Recent admission from 7/11/24 - 7/13/24 for symptomatic anemia. Hemoglobin 5.2 on admission, received 3 units pRBCs with improvement of hemoglobin to 9.3. Underwent EGD on 7/12/24 which revealed large ulcerated mass on lesser curvature on stomach and without evidence of active bleeding. Biopsy returned for adenocarcinoma, moderately differentiated in background of ulceration and H. pylori associated gastritis and intestinal metaplasia. Discharged on PPI. Advised to follow up outpatient with oncology. Since discharge, completed two week course of amoxicillin and clarithromycin. During follow up, underwent CT chest which was unremarkable for metastatic disease. 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 Underwent R. IJ chest port placement on 8/8/24. Started on neoadjuvant FLOT therapy on 8/14/24, which is planned every two weeks. Plan is for surgical resection after chemotherapy, followed by adjuvant chemotherapy.     In the ED, VSS except HR as elevated as 126 and BP as low as 84/55. WBC 24.33K, hgb 10.9, sodium 133, BUN 31, blood glucose 286. Lactic acid 2.8. CT head without acute intracranial findings. CTA A/P without active contrast extravasation into the GI tract to suspect active ongoing bleeding at the time of scanning but with gastric wall thickening and edema in the gastric antrum and severely distended fluid-filled stomach with decompressed duodenum which may relate to gastric outlet obstruction versus gastroparesis. Received 2L NS bolus, ondansetron 4 mg IV, and pantoprazole 80 mg IV. Started on pantoprazole 8 mg/hr. ER PA discussed with GI who will see patient in AM but does not have any current recommendations at this time.  (21 Aug 2024 22:31)  Pt has no active bleeding. Will need chemoradiation for control of bleeding. Monitor H/H    REVIEW OF SYSTEMS unremarkable      General:	    Skin/Breast:  	  Ophthalmologic:  	  ENMT:	    Respiratory and Thorax:  	  Cardiovascular:	    Gastrointestinal:	    Genitourinary:	    Musculoskeletal:	    Neurological:	    Psychiatric:	    Hematology/Lymphatics:	    Endocrine:	    Allergic/Immunologic:	    MEDICATIONS  (STANDING):  dextrose 5%. 1000 milliLiter(s) (100 mL/Hr) IV Continuous <Continuous>  dextrose 5%. 1000 milliLiter(s) (50 mL/Hr) IV Continuous <Continuous>  dextrose 50% Injectable 25 Gram(s) IV Push once  dextrose 50% Injectable 25 Gram(s) IV Push once  dextrose 50% Injectable 12.5 Gram(s) IV Push once  glucagon  Injectable 1 milliGRAM(s) IntraMuscular once  insulin lispro (ADMELOG) corrective regimen sliding scale   SubCutaneous three times a day before meals  insulin lispro (ADMELOG) corrective regimen sliding scale   SubCutaneous at bedtime  lactated ringers. 1000 milliLiter(s) (100 mL/Hr) IV Continuous <Continuous>  pantoprazole Infusion 8 mG/Hr (10 mL/Hr) IV Continuous <Continuous>  sucralfate suspension 1 Gram(s) Oral four times a day    MEDICATIONS  (PRN):  acetaminophen     Tablet .. 650 milliGRAM(s) Oral every 6 hours PRN Temp greater or equal to 38C (100.4F), Mild Pain (1 - 3)  aluminum hydroxide/magnesium hydroxide/simethicone Suspension 30 milliLiter(s) Oral every 4 hours PRN Dyspepsia  dextrose Oral Gel 15 Gram(s) Oral once PRN Blood Glucose LESS THAN 70 milliGRAM(s)/deciliter  melatonin 3 milliGRAM(s) Oral at bedtime PRN Insomnia  ondansetron Injectable 4 milliGRAM(s) IV Push every 8 hours PRN Nausea and/or Vomiting      Vital Signs Last 24 Hrs  T(C): 37.1 (24 Aug 2024 21:20), Max: 37.1 (24 Aug 2024 16:27)  T(F): 98.8 (24 Aug 2024 21:20), Max: 98.8 (24 Aug 2024 16:27)  HR: 90 (24 Aug 2024 21:20) (72 - 105)  BP: 100/67 (24 Aug 2024 21:20) (97/62 - 117/81)  BP(mean): --  RR: 18 (24 Aug 2024 21:20) (16 - 18)  SpO2: 98% (24 Aug 2024 21:20) (98% - 100%)    Parameters below as of 24 Aug 2024 21:20  Patient On (Oxygen Delivery Method): room air        PHYSICAL EXAM:      Constitutional: in no distress    Eyes: no jaundice    ENMT:    Neck: supple    Breasts:    Back:    Respiratory: normal    Cardiovascular: normal    Gastrointestinal: normal    Genitourinary:    Rectal:    Extremities:    Vascular:    Neurological:    Skin:    Lymph Nodes:    Musculoskeletal:    Psychiatric:            HEALTH ISSUES - PROBLEM Dx:  Symptomatic anemia    Coffee ground emesis    Syncope    Lactic acidosis    Leukocytosis    HTN (hypertension)    Prediabetes    Gastric adenocarcinoma              Assesment & Plan: GI bleeding. Chemoradiation when feasible for control of bleeding. Resuscitate as indicated.         
Patient is a 49y old  Male who presents with a chief complaint of Symptomatic anemia secondary to suspected upper GI bleed (22 Aug 2024 13:50)    INTERVAL HPI/OVERNIGHT EVENTS: No acute events overnight. HD stable.     MEDICATIONS  (STANDING):  dextrose 5%. 1000 milliLiter(s) (100 mL/Hr) IV Continuous <Continuous>  dextrose 5%. 1000 milliLiter(s) (50 mL/Hr) IV Continuous <Continuous>  dextrose 50% Injectable 25 Gram(s) IV Push once  dextrose 50% Injectable 12.5 Gram(s) IV Push once  dextrose 50% Injectable 25 Gram(s) IV Push once  glucagon  Injectable 1 milliGRAM(s) IntraMuscular once  insulin lispro (ADMELOG) corrective regimen sliding scale   SubCutaneous three times a day before meals  insulin lispro (ADMELOG) corrective regimen sliding scale   SubCutaneous at bedtime  lactated ringers. 1000 milliLiter(s) (100 mL/Hr) IV Continuous <Continuous>  pantoprazole Infusion 8 mG/Hr (10 mL/Hr) IV Continuous <Continuous>  sucralfate suspension 1 Gram(s) Oral four times a day    MEDICATIONS  (PRN):  acetaminophen     Tablet .. 650 milliGRAM(s) Oral every 6 hours PRN Temp greater or equal to 38C (100.4F), Mild Pain (1 - 3)  aluminum hydroxide/magnesium hydroxide/simethicone Suspension 30 milliLiter(s) Oral every 4 hours PRN Dyspepsia  dextrose Oral Gel 15 Gram(s) Oral once PRN Blood Glucose LESS THAN 70 milliGRAM(s)/deciliter  melatonin 3 milliGRAM(s) Oral at bedtime PRN Insomnia  ondansetron Injectable 4 milliGRAM(s) IV Push every 8 hours PRN Nausea and/or Vomiting      Allergies    No Known Drug Allergies  shellfish (Anaphylaxis)    Intolerances        REVIEW OF SYSTEMS: all negative with exception of above    Vital Signs Last 24 Hrs  T(C): 37.4 (22 Aug 2024 16:38), Max: 37.4 (22 Aug 2024 16:38)  T(F): 99.4 (22 Aug 2024 16:38), Max: 99.4 (22 Aug 2024 16:38)  HR: 86 (22 Aug 2024 16:38) (81 - 103)  BP: 111/66 (22 Aug 2024 16:38) (81/71 - 111/66)  BP(mean): 75 (21 Aug 2024 19:59) (75 - 75)  RR: 17 (22 Aug 2024 16:38) (13 - 18)  SpO2: 97% (22 Aug 2024 16:38) (97% - 100%)    Parameters below as of 22 Aug 2024 16:38  Patient On (Oxygen Delivery Method): room air        PHYSICAL EXAM:  CONSTITUTIONAL: Well groomed, no apparent distress  EYES: PERRLA and symmetric, EOMI  ENMT: Oral mucosa with moist membranes, R. bottom lip with laceration without active bleeding (from syncopal episode as per pt)  RESP: No respiratory distress, no use of accessory muscles; CTA b/l  CV: RRR, R. IJ chest port in place  GI: Soft, NT, ND    LABS:                        8.9    28.92 )-----------( 158      ( 22 Aug 2024 17:20 )             27.6     08-21    133<L>  |  96  |  31<H>  ----------------------------<  286<H>  4.8   |  28  |  1.27    Ca    10.3<H>      21 Aug 2024 14:50    TPro  6.0  /  Alb  3.2<L>  /  TBili  0.4  /  DBili  x   /  AST  13<L>  /  ALT  23  /  AlkPhos  87  08-21    PT/INR - ( 21 Aug 2024 18:14 )   PT: 12.9 sec;   INR: 1.08 ratio         PTT - ( 21 Aug 2024 18:14 )  PTT:26.1 sec  Urinalysis Basic - ( 21 Aug 2024 14:50 )    Color: x / Appearance: x / SG: x / pH: x  Gluc: 286 mg/dL / Ketone: x  / Bili: x / Urobili: x   Blood: x / Protein: x / Nitrite: x   Leuk Esterase: x / RBC: x / WBC x   Sq Epi: x / Non Sq Epi: x / Bacteria: x      CAPILLARY BLOOD GLUCOSE      POCT Blood Glucose.: 139 mg/dL (22 Aug 2024 15:54)  POCT Blood Glucose.: 157 mg/dL (22 Aug 2024 11:37)  POCT Blood Glucose.: 159 mg/dL (22 Aug 2024 07:43)  POCT Blood Glucose.: 152 mg/dL (22 Aug 2024 00:29)  POCT Blood Glucose.: 153 mg/dL (21 Aug 2024 21:25)      RADIOLOGY & ADDITIONAL TESTS:    Imaging Personally Reviewed:  [ ] YES  [ ] NO    Consultant(s) Notes Reviewed:  [ ] YES  [ ] NO    Care Discussed with Consultants/Other Providers [ ] YES  [ ] NO
Patient seen and examined at bedside resting comfortably.   Admits to 2 episodes of coffee ground emesis overnight.   Still tolerating clear liquid diet at this time.   Denies dizziness, SOB, chest pain, abdominal pain, nausea/ vomiting, chills, calf tenderness.          Vital Signs Last 24 Hrs  T(F): 98.6 (08-23-24 @ 11:08), Max: 99.4 (08-22-24 @ 16:38)  HR: 98 (08-23-24 @ 11:08)  BP: 112/74 (08-23-24 @ 11:08)  RR: 19 (08-23-24 @ 11:08)  SpO2: 100% (08-23-24 @ 11:08)  Wt(kg): --   CAPILLARY BLOOD GLUCOSE      POCT Blood Glucose.: 215 mg/dL (23 Aug 2024 11:30)      GENERAL: Alert, NAD  CHEST/LUNG: Respirations non labored, good inspiratory effort  HEART: S1S2  HEENT: NCAT, EOMI, conjunctiva clear   ABDOMEN: Nondistended, + bowel sounds, nontender  EXTREMITIES:  No calf tenderness, no edema    I&O's Detail    22 Aug 2024 07:01  -  23 Aug 2024 07:00  --------------------------------------------------------  IN:    Oral Fluid: 777 mL  Total IN: 777 mL    OUT:    Voided (mL): 600 mL  Total OUT: 600 mL    Total NET: 177 mL          LABS:                        8.6    33.52 )-----------( 176      ( 23 Aug 2024 05:46 )             27.3     08-21    133<L>  |  96  |  31<H>  ----------------------------<  286<H>  4.8   |  28  |  1.27    Ca    10.3<H>      21 Aug 2024 14:50    TPro  6.0  /  Alb  3.2<L>  /  TBili  0.4  /  DBili  x   /  AST  13<L>  /  ALT  23  /  AlkPhos  87  08-21    PT/INR - ( 21 Aug 2024 18:14 )   PT: 12.9 sec;   INR: 1.08 ratio         PTT - ( 21 Aug 2024 18:14 )  PTT:26.1 sec          
Pt dropped Hgb overnight and transfused 1u prbc's  Hgb 7.5 -->8.1  Pt with ulcerated gastric mass diagnosed at endoscopy      MEDICATIONS  (STANDING):  dextrose 5%. 1000 milliLiter(s) (100 mL/Hr) IV Continuous <Continuous>  dextrose 5%. 1000 milliLiter(s) (50 mL/Hr) IV Continuous <Continuous>  dextrose 50% Injectable 25 Gram(s) IV Push once  dextrose 50% Injectable 25 Gram(s) IV Push once  dextrose 50% Injectable 12.5 Gram(s) IV Push once  glucagon  Injectable 1 milliGRAM(s) IntraMuscular once  insulin lispro (ADMELOG) corrective regimen sliding scale   SubCutaneous three times a day before meals  insulin lispro (ADMELOG) corrective regimen sliding scale   SubCutaneous at bedtime  lactated ringers. 1000 milliLiter(s) (100 mL/Hr) IV Continuous <Continuous>  pantoprazole Infusion 8 mG/Hr (10 mL/Hr) IV Continuous <Continuous>  sucralfate suspension 1 Gram(s) Oral four times a day    MEDICATIONS  (PRN):  acetaminophen     Tablet .. 650 milliGRAM(s) Oral every 6 hours PRN Temp greater or equal to 38C (100.4F), Mild Pain (1 - 3)  aluminum hydroxide/magnesium hydroxide/simethicone Suspension 30 milliLiter(s) Oral every 4 hours PRN Dyspepsia  dextrose Oral Gel 15 Gram(s) Oral once PRN Blood Glucose LESS THAN 70 milliGRAM(s)/deciliter  melatonin 3 milliGRAM(s) Oral at bedtime PRN Insomnia  ondansetron Injectable 4 milliGRAM(s) IV Push every 8 hours PRN Nausea and/or Vomiting      Allergies    No Known Drug Allergies  shellfish (Anaphylaxis)    Intolerances        Vital Signs Last 24 Hrs  T(C): 36.8 (22 Aug 2024 05:22), Max: 37.1 (22 Aug 2024 00:55)  T(F): 98.3 (22 Aug 2024 05:22), Max: 98.7 (22 Aug 2024 00:55)  HR: 83 (22 Aug 2024 05:22) (83 - 126)  BP: 101/67 (22 Aug 2024 05:22) (81/71 - 111/74)  BP(mean): 75 (21 Aug 2024 19:59) (75 - 92)  RR: 18 (22 Aug 2024 05:22) (10 - 18)  SpO2: 98% (22 Aug 2024 05:22) (95% - 100%)    Parameters below as of 22 Aug 2024 05:22  Patient On (Oxygen Delivery Method): room air        PHYSICAL EXAM:  General: NAD.  CVS: S1, S2  Chest: air entry bilaterally present  Abd: BS present, soft, non-tender      LABS:                        8.1    21.37 )-----------( 168      ( 22 Aug 2024 06:36 )             26.6     08-21    133<L>  |  96  |  31<H>  ----------------------------<  286<H>  4.8   |  28  |  1.27    Ca    10.3<H>      21 Aug 2024 14:50    TPro  6.0  /  Alb  3.2<L>  /  TBili  0.4  /  DBili  x   /  AST  13<L>  /  ALT  23  /  AlkPhos  87  08-21    PT/INR - ( 21 Aug 2024 18:14 )   PT: 12.9 sec;   INR: 1.08 ratio         PTT - ( 21 Aug 2024 18:14 )  PTT:26.1 sec    continue carafate and protonix  liquid diet  follow CBC q6h  would get Oncology and surgery involved  transfuse 2nd unit prbc's    
Pt has received 2u prbc's  Pt feeling a little better but had episode of coffee ground emesis overnight      MEDICATIONS  (STANDING):  dextrose 5%. 1000 milliLiter(s) (100 mL/Hr) IV Continuous <Continuous>  dextrose 5%. 1000 milliLiter(s) (50 mL/Hr) IV Continuous <Continuous>  dextrose 50% Injectable 25 Gram(s) IV Push once  dextrose 50% Injectable 25 Gram(s) IV Push once  dextrose 50% Injectable 12.5 Gram(s) IV Push once  glucagon  Injectable 1 milliGRAM(s) IntraMuscular once  insulin lispro (ADMELOG) corrective regimen sliding scale   SubCutaneous three times a day before meals  insulin lispro (ADMELOG) corrective regimen sliding scale   SubCutaneous at bedtime  lactated ringers. 1000 milliLiter(s) (100 mL/Hr) IV Continuous <Continuous>  pantoprazole Infusion 8 mG/Hr (10 mL/Hr) IV Continuous <Continuous>  sucralfate suspension 1 Gram(s) Oral four times a day    MEDICATIONS  (PRN):  acetaminophen     Tablet .. 650 milliGRAM(s) Oral every 6 hours PRN Temp greater or equal to 38C (100.4F), Mild Pain (1 - 3)  aluminum hydroxide/magnesium hydroxide/simethicone Suspension 30 milliLiter(s) Oral every 4 hours PRN Dyspepsia  dextrose Oral Gel 15 Gram(s) Oral once PRN Blood Glucose LESS THAN 70 milliGRAM(s)/deciliter  melatonin 3 milliGRAM(s) Oral at bedtime PRN Insomnia  ondansetron Injectable 4 milliGRAM(s) IV Push every 8 hours PRN Nausea and/or Vomiting      Allergies    No Known Drug Allergies  shellfish (Anaphylaxis)    Intolerances        Vital Signs Last 24 Hrs  T(C): 37.1 (23 Aug 2024 04:59), Max: 37.4 (22 Aug 2024 16:38)  T(F): 98.7 (23 Aug 2024 04:59), Max: 99.4 (22 Aug 2024 16:38)  HR: 95 (23 Aug 2024 04:59) (86 - 96)  BP: 106/67 (23 Aug 2024 04:59) (103/71 - 120/74)  BP(mean): --  RR: 18 (23 Aug 2024 04:59) (17 - 18)  SpO2: 99% (23 Aug 2024 04:59) (97% - 100%)    Parameters below as of 23 Aug 2024 04:59  Patient On (Oxygen Delivery Method): room air        PHYSICAL EXAM:  General: NAD.  CVS: S1, S2  Chest: air entry bilaterally present  Abd: BS present, soft, non-tender      LABS:                        8.6    33.52 )-----------( 176      ( 23 Aug 2024 05:46 )             27.3     08-21    133<L>  |  96  |  31<H>  ----------------------------<  286<H>  4.8   |  28  |  1.27    Ca    10.3<H>      21 Aug 2024 14:50    TPro  6.0  /  Alb  3.2<L>  /  TBili  0.4  /  DBili  x   /  AST  13<L>  /  ALT  23  /  AlkPhos  87  08-21    PT/INR - ( 21 Aug 2024 18:14 )   PT: 12.9 sec;   INR: 1.08 ratio         PTT - ( 21 Aug 2024 18:14 )  PTT:26.1 sec    continue to follow H/H  on protonix and carafate  full fluids  Oncology management  Dr Simmons-Onu to assume GI care as of 8/24        
HPI:  Isrrael Cortez is a 49 year old male with PMHx of HTN, prediabetes, and gastric CA (on neoadjuvant FLOT chemotherapy, received first session on 8/14/24) who presented to the ED on 8/21/24 for complaints of vomiting blood and passing out.    Patient reports he was extremely tired yesterday but attributed it to recent initiation of chemotherapy. Wife called oncology who advised her to monitor. This morning, patient went downstairs for a snack and then became "winded" while ambulating back up the stairs to his bedroom. He laid down in bed and then went to the bathroom. Felt nauseous and lightheaded. Vomited up coffee ground emesis in the bathroom sink. Then he remembers falling onto the floor and hitting his right bottom lip. Wife was downstairs when she heard a thud which prompted her to come upstairs. Found patient on the floor, face down, projectile vomiting copious amounts of coffee ground emesis. Total duration of unconsciousness is estimated to be about two minutes. Brought to the ER for further evaluation.     Recent admission from 7/11/24 - 7/13/24 for symptomatic anemia. Hemoglobin 5.2 on admission, received 3 units pRBCs with improvement of hemoglobin to 9.3. Underwent EGD on 7/12/24 which revealed large ulcerated mass on lesser curvature on stomach and without evidence of active bleeding. Biopsy returned for adenocarcinoma, moderately differentiated in background of ulceration and H. pylori associated gastritis and intestinal metaplasia. Discharged on PPI. Advised to follow up outpatient with oncology. Since discharge, completed two week course of amoxicillin and clarithromycin. During follow up, underwent CT chest which was unremarkable for metastatic disease. 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 Underwent R. IJ chest port placement on 8/8/24. Started on neoadjuvant FLOT therapy on 8/14/24, which is planned every two weeks. Plan is for surgical resection after chemotherapy, followed by adjuvant chemotherapy.     In the ED, VSS except HR as elevated as 126 and BP as low as 84/55. WBC 24.33K, hgb 10.9, sodium 133, BUN 31, blood glucose 286. Lactic acid 2.8. CT head without acute intracranial findings. CTA A/P without active contrast extravasation into the GI tract to suspect active ongoing bleeding at the time of scanning but with gastric wall thickening and edema in the gastric antrum and severely distended fluid-filled stomach with decompressed duodenum which may relate to gastric outlet obstruction versus gastroparesis. Received 2L NS bolus, ondansetron 4 mg IV, and pantoprazole 80 mg IV. Started on pantoprazole 8 mg/hr. ER PA discussed with GI who will see patient in AM but does not have any current recommendations at this time.  (21 Aug 2024 22:31)  Pt resting in bed. No active bleeding    REVIEW OF SYSTEMS unremarkable      General:	    Skin/Breast:  	  Ophthalmologic:  	  ENMT:	    Respiratory and Thorax:  	  Cardiovascular:	    Gastrointestinal:	    Genitourinary:	    Musculoskeletal:	    Neurological:	    Psychiatric:	    Hematology/Lymphatics:	    Endocrine:	    Allergic/Immunologic:	    MEDICATIONS  (STANDING):  dextrose 5%. 1000 milliLiter(s) (100 mL/Hr) IV Continuous <Continuous>  dextrose 5%. 1000 milliLiter(s) (50 mL/Hr) IV Continuous <Continuous>  dextrose 50% Injectable 25 Gram(s) IV Push once  dextrose 50% Injectable 25 Gram(s) IV Push once  dextrose 50% Injectable 12.5 Gram(s) IV Push once  glucagon  Injectable 1 milliGRAM(s) IntraMuscular once  insulin lispro (ADMELOG) corrective regimen sliding scale   SubCutaneous three times a day before meals  insulin lispro (ADMELOG) corrective regimen sliding scale   SubCutaneous at bedtime  lactated ringers. 1000 milliLiter(s) (100 mL/Hr) IV Continuous <Continuous>  pantoprazole Infusion 8 mG/Hr (10 mL/Hr) IV Continuous <Continuous>  sucralfate suspension 1 Gram(s) Oral four times a day    MEDICATIONS  (PRN):  acetaminophen     Tablet .. 650 milliGRAM(s) Oral every 6 hours PRN Temp greater or equal to 38C (100.4F), Mild Pain (1 - 3)  aluminum hydroxide/magnesium hydroxide/simethicone Suspension 30 milliLiter(s) Oral every 4 hours PRN Dyspepsia  dextrose Oral Gel 15 Gram(s) Oral once PRN Blood Glucose LESS THAN 70 milliGRAM(s)/deciliter  melatonin 3 milliGRAM(s) Oral at bedtime PRN Insomnia  ondansetron Injectable 4 milliGRAM(s) IV Push every 8 hours PRN Nausea and/or Vomiting      Vital Signs Last 24 Hrs  T(C): 36.9 (25 Aug 2024 10:34), Max: 37.2 (24 Aug 2024 23:55)  T(F): 98.4 (25 Aug 2024 10:34), Max: 98.9 (24 Aug 2024 23:55)  HR: 73 (25 Aug 2024 10:34) (73 - 97)  BP: 103/69 (25 Aug 2024 10:34) (97/62 - 108/72)  BP(mean): --  RR: 18 (25 Aug 2024 10:34) (17 - 18)  SpO2: 99% (25 Aug 2024 10:34) (98% - 99%)    Parameters below as of 25 Aug 2024 10:34  Patient On (Oxygen Delivery Method): room air        PHYSICAL EXAM:      Constitutional: in no distress    Eyes: no icterus    ENMT:    Neck: supple    Breasts:    Back:    Respiratory: normal    Cardiovascular: normal    Gastrointestinal: normal    Genitourinary:    Rectal:    Extremities:    Vascular:    Neurological:    Skin:    Lymph Nodes:    Musculoskeletal:    Psychiatric:            HEALTH ISSUES - PROBLEM Dx:  Symptomatic anemia    Coffee ground emesis    Syncope    Lactic acidosis    Leukocytosis    HTN (hypertension)    Prediabetes    Gastric adenocarcinoma              Assesment & Plan: Gastric ca. Chemoradiation per oncologist        
Patient is a 49y old  Male who presents with a chief complaint of Symptomatic anemia secondary to suspected upper GI bleed (23 Aug 2024 13:54)    INTERVAL HPI/OVERNIGHT EVENTS: No acute events overnight. HD stable.     MEDICATIONS  (STANDING):  dextrose 5%. 1000 milliLiter(s) (100 mL/Hr) IV Continuous <Continuous>  dextrose 5%. 1000 milliLiter(s) (50 mL/Hr) IV Continuous <Continuous>  dextrose 50% Injectable 25 Gram(s) IV Push once  dextrose 50% Injectable 25 Gram(s) IV Push once  dextrose 50% Injectable 12.5 Gram(s) IV Push once  glucagon  Injectable 1 milliGRAM(s) IntraMuscular once  insulin lispro (ADMELOG) corrective regimen sliding scale   SubCutaneous three times a day before meals  insulin lispro (ADMELOG) corrective regimen sliding scale   SubCutaneous at bedtime  lactated ringers. 1000 milliLiter(s) (100 mL/Hr) IV Continuous <Continuous>  pantoprazole Infusion 8 mG/Hr (10 mL/Hr) IV Continuous <Continuous>  sucralfate suspension 1 Gram(s) Oral four times a day    MEDICATIONS  (PRN):  acetaminophen     Tablet .. 650 milliGRAM(s) Oral every 6 hours PRN Temp greater or equal to 38C (100.4F), Mild Pain (1 - 3)  aluminum hydroxide/magnesium hydroxide/simethicone Suspension 30 milliLiter(s) Oral every 4 hours PRN Dyspepsia  dextrose Oral Gel 15 Gram(s) Oral once PRN Blood Glucose LESS THAN 70 milliGRAM(s)/deciliter  melatonin 3 milliGRAM(s) Oral at bedtime PRN Insomnia  ondansetron Injectable 4 milliGRAM(s) IV Push every 8 hours PRN Nausea and/or Vomiting      Allergies    No Known Drug Allergies  shellfish (Anaphylaxis)    Intolerances        REVIEW OF SYSTEMS: all negative with exception of above    Vital Signs Last 24 Hrs  T(C): 36.8 (24 Aug 2024 12:00), Max: 36.9 (23 Aug 2024 16:40)  T(F): 98.2 (24 Aug 2024 12:00), Max: 98.5 (23 Aug 2024 16:40)  HR: 99 (24 Aug 2024 12:00) (72 - 105)  BP: 113/76 (24 Aug 2024 12:00) (100/64 - 117/75)  BP(mean): --  RR: 16 (24 Aug 2024 12:00) (16 - 18)  SpO2: 100% (24 Aug 2024 12:00) (98% - 100%)    Parameters below as of 24 Aug 2024 12:00  Patient On (Oxygen Delivery Method): room air        PHYSICAL EXAM:  GENERAL: NAD, well-groomed  NERVOUS SYSTEM:  Alert & Oriented X3, Good concentration; Motor Strength 5/5 B/L upper and lower extremities; DTRs 2+ intact and symmetric  CHEST/LUNG: Clear to percussion bilaterally; No rales, rhonchi, wheezing, or rubs  HEART: Regular rate and rhythm; No murmurs, rubs, or gallops  ABDOMEN: Soft, Nontender, Nondistended; Bowel sounds present  EXTREMITIES:  2+ Peripheral Pulses, No clubbing, cyanosis, or edema    LABS:                        7.3    29.28 )-----------( 183      ( 24 Aug 2024 05:53 )             23.2     08-24    139  |  106  |  16  ----------------------------<  144<H>  4.1   |  28  |  0.81    Ca    8.2<L>      24 Aug 2024 05:53    TPro  4.5<L>  /  Alb  2.3<L>  /  TBili  0.2  /  DBili  x   /  AST  9<L>  /  ALT  16  /  AlkPhos  79  08-24      Urinalysis Basic - ( 24 Aug 2024 05:53 )    Color: x / Appearance: x / SG: x / pH: x  Gluc: 144 mg/dL / Ketone: x  / Bili: x / Urobili: x   Blood: x / Protein: x / Nitrite: x   Leuk Esterase: x / RBC: x / WBC x   Sq Epi: x / Non Sq Epi: x / Bacteria: x      CAPILLARY BLOOD GLUCOSE      POCT Blood Glucose.: 181 mg/dL (24 Aug 2024 11:27)  POCT Blood Glucose.: 144 mg/dL (24 Aug 2024 07:53)  POCT Blood Glucose.: 143 mg/dL (23 Aug 2024 22:00)  POCT Blood Glucose.: 196 mg/dL (23 Aug 2024 17:34)      RADIOLOGY & ADDITIONAL TESTS:    Imaging Personally Reviewed:  [ ] YES  [ ] NO    Consultant(s) Notes Reviewed:  [ ] YES  [ ] NO    Care Discussed with Consultants/Other Providers [ ] YES  [ ] NO
Patient is a 49y old  Male who presents with a chief complaint of GI BLEEDING (23 Aug 2024 12:00)    INTERVAL HPI/OVERNIGHT EVENTS: No acute events overnight. HD stable.     MEDICATIONS  (STANDING):  dextrose 5%. 1000 milliLiter(s) (100 mL/Hr) IV Continuous <Continuous>  dextrose 5%. 1000 milliLiter(s) (50 mL/Hr) IV Continuous <Continuous>  dextrose 50% Injectable 25 Gram(s) IV Push once  dextrose 50% Injectable 25 Gram(s) IV Push once  dextrose 50% Injectable 12.5 Gram(s) IV Push once  glucagon  Injectable 1 milliGRAM(s) IntraMuscular once  insulin lispro (ADMELOG) corrective regimen sliding scale   SubCutaneous three times a day before meals  insulin lispro (ADMELOG) corrective regimen sliding scale   SubCutaneous at bedtime  lactated ringers. 1000 milliLiter(s) (100 mL/Hr) IV Continuous <Continuous>  pantoprazole Infusion 8 mG/Hr (10 mL/Hr) IV Continuous <Continuous>  sucralfate suspension 1 Gram(s) Oral four times a day    MEDICATIONS  (PRN):  acetaminophen     Tablet .. 650 milliGRAM(s) Oral every 6 hours PRN Temp greater or equal to 38C (100.4F), Mild Pain (1 - 3)  aluminum hydroxide/magnesium hydroxide/simethicone Suspension 30 milliLiter(s) Oral every 4 hours PRN Dyspepsia  dextrose Oral Gel 15 Gram(s) Oral once PRN Blood Glucose LESS THAN 70 milliGRAM(s)/deciliter  melatonin 3 milliGRAM(s) Oral at bedtime PRN Insomnia  ondansetron Injectable 4 milliGRAM(s) IV Push every 8 hours PRN Nausea and/or Vomiting      Allergies    No Known Drug Allergies  shellfish (Anaphylaxis)    Intolerances        REVIEW OF SYSTEMS: all negative with exception of above    Vital Signs Last 24 Hrs  T(C): 37 (23 Aug 2024 11:08), Max: 37.4 (22 Aug 2024 16:38)  T(F): 98.6 (23 Aug 2024 11:08), Max: 99.4 (22 Aug 2024 16:38)  HR: 98 (23 Aug 2024 11:08) (86 - 98)  BP: 112/74 (23 Aug 2024 11:08) (106/67 - 120/74)  BP(mean): --  RR: 19 (23 Aug 2024 11:08) (17 - 19)  SpO2: 100% (23 Aug 2024 11:08) (97% - 100%)    Parameters below as of 23 Aug 2024 11:08  Patient On (Oxygen Delivery Method): room air        PHYSICAL EXAM:  GENERAL: NAD, well-groomed, well-developed  HEAD:  Atraumatic, Normocephalic  EYES: EOMI, PERRLA, conjunctiva and sclera clear  ENMT: No tonsillar erythema, exudates, or enlargement; Moist mucous membranes, Good dentition, No lesions  NECK: Supple, No JVD, Normal thyroid  NERVOUS SYSTEM:  Alert & Oriented X3, Good concentration; Motor Strength 5/5 B/L upper and lower extremities; DTRs 2+ intact and symmetric  CHEST/LUNG: Clear to percussion bilaterally; No rales, rhonchi, wheezing, or rubs  HEART: Regular rate and rhythm; No murmurs, rubs, or gallops  ABDOMEN: Soft, Nontender, Nondistended; Bowel sounds present  EXTREMITIES:  2+ Peripheral Pulses, No clubbing, cyanosis, or edema  LYMPH: No lymphadenopathy noted  SKIN: No rashes or lesions    LABS:                        8.6    33.52 )-----------( 176      ( 23 Aug 2024 05:46 )             27.3     08-21    133<L>  |  96  |  31<H>  ----------------------------<  286<H>  4.8   |  28  |  1.27    Ca    10.3<H>      21 Aug 2024 14:50    TPro  6.0  /  Alb  3.2<L>  /  TBili  0.4  /  DBili  x   /  AST  13<L>  /  ALT  23  /  AlkPhos  87  08-21    PT/INR - ( 21 Aug 2024 18:14 )   PT: 12.9 sec;   INR: 1.08 ratio         PTT - ( 21 Aug 2024 18:14 )  PTT:26.1 sec  Urinalysis Basic - ( 21 Aug 2024 14:50 )    Color: x / Appearance: x / SG: x / pH: x  Gluc: 286 mg/dL / Ketone: x  / Bili: x / Urobili: x   Blood: x / Protein: x / Nitrite: x   Leuk Esterase: x / RBC: x / WBC x   Sq Epi: x / Non Sq Epi: x / Bacteria: x      CAPILLARY BLOOD GLUCOSE      POCT Blood Glucose.: 215 mg/dL (23 Aug 2024 11:30)  POCT Blood Glucose.: 185 mg/dL (23 Aug 2024 07:52)  POCT Blood Glucose.: 127 mg/dL (22 Aug 2024 21:15)  POCT Blood Glucose.: 139 mg/dL (22 Aug 2024 15:54)      RADIOLOGY & ADDITIONAL TESTS:    Imaging Personally Reviewed:  [ ] YES  [ ] NO    Consultant(s) Notes Reviewed:  [ ] YES  [ ] NO    Care Discussed with Consultants/Other Providers [ ] YES  [ ] NO
Patient is a 49y old  Male who presents with a chief complaint of Symptomatic anemia secondary to suspected upper GI bleed (24 Aug 2024 22:00)    INTERVAL HPI/OVERNIGHT EVENTS: No acute events overnight. HD stable.     MEDICATIONS  (STANDING):  dextrose 5%. 1000 milliLiter(s) (100 mL/Hr) IV Continuous <Continuous>  dextrose 5%. 1000 milliLiter(s) (50 mL/Hr) IV Continuous <Continuous>  dextrose 50% Injectable 25 Gram(s) IV Push once  dextrose 50% Injectable 12.5 Gram(s) IV Push once  dextrose 50% Injectable 25 Gram(s) IV Push once  glucagon  Injectable 1 milliGRAM(s) IntraMuscular once  insulin lispro (ADMELOG) corrective regimen sliding scale   SubCutaneous three times a day before meals  insulin lispro (ADMELOG) corrective regimen sliding scale   SubCutaneous at bedtime  lactated ringers. 1000 milliLiter(s) (100 mL/Hr) IV Continuous <Continuous>  pantoprazole Infusion 8 mG/Hr (10 mL/Hr) IV Continuous <Continuous>  sucralfate suspension 1 Gram(s) Oral four times a day    MEDICATIONS  (PRN):  acetaminophen     Tablet .. 650 milliGRAM(s) Oral every 6 hours PRN Temp greater or equal to 38C (100.4F), Mild Pain (1 - 3)  aluminum hydroxide/magnesium hydroxide/simethicone Suspension 30 milliLiter(s) Oral every 4 hours PRN Dyspepsia  dextrose Oral Gel 15 Gram(s) Oral once PRN Blood Glucose LESS THAN 70 milliGRAM(s)/deciliter  melatonin 3 milliGRAM(s) Oral at bedtime PRN Insomnia  ondansetron Injectable 4 milliGRAM(s) IV Push every 8 hours PRN Nausea and/or Vomiting      Allergies    No Known Drug Allergies  shellfish (Anaphylaxis)    Intolerances        REVIEW OF SYSTEMS: all negative with exception of above    Vital Signs Last 24 Hrs  T(C): 36.9 (25 Aug 2024 10:34), Max: 37.2 (24 Aug 2024 23:55)  T(F): 98.4 (25 Aug 2024 10:34), Max: 98.9 (24 Aug 2024 23:55)  HR: 73 (25 Aug 2024 10:34) (73 - 97)  BP: 103/69 (25 Aug 2024 10:34) (97/62 - 108/72)  BP(mean): --  RR: 18 (25 Aug 2024 10:34) (17 - 18)  SpO2: 99% (25 Aug 2024 10:34) (98% - 99%)    Parameters below as of 25 Aug 2024 10:34  Patient On (Oxygen Delivery Method): room air        PHYSICAL EXAM:  GENERAL: NAD, well-groomed  NERVOUS SYSTEM:  Alert & Oriented X3, Good concentration; Motor Strength 5/5 B/L upper and lower extremities; DTRs 2+ intact and symmetric  CHEST/LUNG: Clear to percussion bilaterally; No rales, rhonchi, wheezing, or rubs  HEART: Regular rate and rhythm; No murmurs, rubs, or gallops  ABDOMEN: Soft, Nontender, Nondistended; Bowel sounds present  EXTREMITIES:  2+ Peripheral Pulses, No clubbing, cyanosis, or edema    LABS:                        7.9    25.93 )-----------( 178      ( 25 Aug 2024 11:20 )             24.7     08-25    139  |  108  |  12  ----------------------------<  124<H>  4.0   |  26  |  0.77    Ca    8.4<L>      25 Aug 2024 05:43    TPro  4.7<L>  /  Alb  2.4<L>  /  TBili  0.3  /  DBili  x   /  AST  19  /  ALT  27  /  AlkPhos  97  08-25      Urinalysis Basic - ( 25 Aug 2024 05:43 )    Color: x / Appearance: x / SG: x / pH: x  Gluc: 124 mg/dL / Ketone: x  / Bili: x / Urobili: x   Blood: x / Protein: x / Nitrite: x   Leuk Esterase: x / RBC: x / WBC x   Sq Epi: x / Non Sq Epi: x / Bacteria: x      CAPILLARY BLOOD GLUCOSE      POCT Blood Glucose.: 143 mg/dL (25 Aug 2024 11:18)  POCT Blood Glucose.: 145 mg/dL (25 Aug 2024 08:01)  POCT Blood Glucose.: 152 mg/dL (24 Aug 2024 21:17)  POCT Blood Glucose.: 145 mg/dL (24 Aug 2024 17:10)      RADIOLOGY & ADDITIONAL TESTS:    Imaging Personally Reviewed:  [ ] YES  [ ] NO    Consultant(s) Notes Reviewed:  [ ] YES  [ ] NO    Care Discussed with Consultants/Other Providers [ ] YES  [ ] NO

## 2024-08-26 ENCOUNTER — TRANSCRIPTION ENCOUNTER (OUTPATIENT)
Age: 49
End: 2024-08-26

## 2024-08-26 VITALS
DIASTOLIC BLOOD PRESSURE: 66 MMHG | RESPIRATION RATE: 18 BRPM | SYSTOLIC BLOOD PRESSURE: 101 MMHG | HEART RATE: 80 BPM | TEMPERATURE: 98 F | OXYGEN SATURATION: 97 %

## 2024-08-26 LAB
ALBUMIN SERPL ELPH-MCNC: 2.3 G/DL — LOW (ref 3.3–5)
ALP SERPL-CCNC: 87 U/L — SIGNIFICANT CHANGE UP (ref 40–120)
ALT FLD-CCNC: 32 U/L — SIGNIFICANT CHANGE UP (ref 12–78)
ANION GAP SERPL CALC-SCNC: 4 MMOL/L — LOW (ref 5–17)
AST SERPL-CCNC: 22 U/L — SIGNIFICANT CHANGE UP (ref 15–37)
BILIRUB SERPL-MCNC: 0.2 MG/DL — SIGNIFICANT CHANGE UP (ref 0.2–1.2)
BUN SERPL-MCNC: 11 MG/DL — SIGNIFICANT CHANGE UP (ref 7–23)
CALCIUM SERPL-MCNC: 8.1 MG/DL — LOW (ref 8.5–10.1)
CHLORIDE SERPL-SCNC: 109 MMOL/L — HIGH (ref 96–108)
CO2 SERPL-SCNC: 28 MMOL/L — SIGNIFICANT CHANGE UP (ref 22–31)
CREAT SERPL-MCNC: 0.69 MG/DL — SIGNIFICANT CHANGE UP (ref 0.5–1.3)
EGFR: 112 ML/MIN/1.73M2 — SIGNIFICANT CHANGE UP
GLUCOSE BLDC GLUCOMTR-MCNC: 145 MG/DL — HIGH (ref 70–99)
GLUCOSE SERPL-MCNC: 130 MG/DL — HIGH (ref 70–99)
HCT VFR BLD CALC: 24.5 % — LOW (ref 39–50)
HGB BLD-MCNC: 7.6 G/DL — LOW (ref 13–17)
MCHC RBC-ENTMCNC: 27.4 PG — SIGNIFICANT CHANGE UP (ref 27–34)
MCHC RBC-ENTMCNC: 31 G/DL — LOW (ref 32–36)
MCV RBC AUTO: 88.4 FL — SIGNIFICANT CHANGE UP (ref 80–100)
NRBC # BLD: 0 /100 WBCS — SIGNIFICANT CHANGE UP (ref 0–0)
PLATELET # BLD AUTO: 195 K/UL — SIGNIFICANT CHANGE UP (ref 150–400)
POTASSIUM SERPL-MCNC: 3.5 MMOL/L — SIGNIFICANT CHANGE UP (ref 3.5–5.3)
POTASSIUM SERPL-SCNC: 3.5 MMOL/L — SIGNIFICANT CHANGE UP (ref 3.5–5.3)
PROT SERPL-MCNC: 4.5 GM/DL — LOW (ref 6–8.3)
RBC # BLD: 2.77 M/UL — LOW (ref 4.2–5.8)
RBC # FLD: 25.1 % — HIGH (ref 10.3–14.5)
SODIUM SERPL-SCNC: 141 MMOL/L — SIGNIFICANT CHANGE UP (ref 135–145)
WBC # BLD: 21.96 K/UL — HIGH (ref 3.8–10.5)
WBC # FLD AUTO: 21.96 K/UL — HIGH (ref 3.8–10.5)

## 2024-08-26 PROCEDURE — 99239 HOSP IP/OBS DSCHRG MGMT >30: CPT

## 2024-08-26 RX ORDER — PANTOPRAZOLE SODIUM 40 MG
1 TABLET, DELAYED RELEASE (ENTERIC COATED) ORAL
Qty: 30 | Refills: 0
Start: 2024-08-26 | End: 2024-09-24

## 2024-08-26 RX ORDER — SUCRALFATE 1 G/10ML
10 SUSPENSION ORAL
Qty: 1200 | Refills: 0
Start: 2024-08-26 | End: 2024-09-24

## 2024-08-26 RX ADMIN — Medication 10 MG/HR: at 03:50

## 2024-08-26 RX ADMIN — SUCRALFATE 1 GRAM(S): 1 SUSPENSION ORAL at 11:15

## 2024-08-26 RX ADMIN — Medication 10 MG/HR: at 11:15

## 2024-08-26 RX ADMIN — SUCRALFATE 1 GRAM(S): 1 SUSPENSION ORAL at 06:06

## 2024-08-26 NOTE — DISCHARGE NOTE PROVIDER - ATTENDING DISCHARGE PHYSICAL EXAMINATION:
Vital Signs Last 24 Hrs  T(F): 98 (26 Aug 2024 04:58), Max: 98.6 (25 Aug 2024 20:50)  HR: 73 (26 Aug 2024 04:58) (73 - 87)  BP: 101/64 (26 Aug 2024 04:58) (100/69 - 108/70)  RR: 18 (26 Aug 2024 04:58) (17 - 18)  SpO2: 99% (26 Aug 2024 04:58) (98% - 99%)    Physical Exam:  Constitutional: NAD, awake and alert  Respiratory: cta b/l no wheezing no rhonchi  Cardiovascular: +s1/s2 no edema b/l le  Gastrointestinal: soft nt nd bs+  Vascular: 2+ peripheral pulses  Neurological: A/O x 3, no focal deficits

## 2024-08-26 NOTE — DISCHARGE NOTE PROVIDER - NSDCMRMEDTOKEN_GEN_ALL_CORE_FT
pantoprazole 40 mg oral delayed release tablet: 1 tab(s) orally once a day (before a meal) MDD: 2   pantoprazole 40 mg oral delayed release tablet: 1 tab(s) orally once a day (before a meal) MDD: 2  sucralfate 1 g/10 mL oral suspension: 10 milliliter(s) orally 4 times a day

## 2024-08-26 NOTE — DISCHARGE NOTE PROVIDER - NSDCCPCAREPLAN_GEN_ALL_CORE_FT
PRINCIPAL DISCHARGE DIAGNOSIS  Diagnosis: GI bleeding  Assessment and Plan of Treatment:      PRINCIPAL DISCHARGE DIAGNOSIS  Diagnosis: Anemia due to acute blood loss  Assessment and Plan of Treatment:       SECONDARY DISCHARGE DIAGNOSES  Diagnosis: Lactic acidosis  Assessment and Plan of Treatment:     Diagnosis: Gastric adenocarcinoma  Assessment and Plan of Treatment:     Diagnosis: Leukocytosis  Assessment and Plan of Treatment:

## 2024-08-26 NOTE — DISCHARGE NOTE PROVIDER - HOSPITAL COURSE
Isrrael Cortez is a 49 year old male with PMHx of HTN, prediabetes, and gastric CA (on neoadjuvant FLOT chemotherapy, received first session on 8/14/24) who presented to the ED on 8/21/24 for complaints of vomiting blood and passing out and admitted for symptomatic anemia secondary to suspected upper GI bleed.    Symptomatic anemia and syncope secondary to acute blood loss anemia in setting of upper GI Bleed  Lactic acidosis, resolved  Gastric cancer, adenocarcinoma  Leukocytosis, likely reactive to steroid administration    On 8/26/24 this case was reviewed with Dr. Lang, the patient is medically stable and optimized for discharge. All medications were reviewed and appropriate prescriptions were sent to mutually agreed upon pharmacy.

## 2024-08-26 NOTE — DISCHARGE NOTE NURSING/CASE MANAGEMENT/SOCIAL WORK - PATIENT PORTAL LINK FT
You can access the FollowMyHealth Patient Portal offered by [facility] by registering at the following website: [email or web address]. By joining [facility]’s FollowMyHealth portal, you will also be able to view your health information using other applications (apps) compatible with our system.

## 2024-08-26 NOTE — DISCHARGE NOTE PROVIDER - NSDCFUSCHEDAPPT_GEN_ALL_CORE_FT
Marisol Pereira  [facility] Physician [facility]  [facility] [address]  Scheduled Appointment: 08/28/2024    [facility]  [address]  [address]  Scheduled Appointment: 08/28/2024    [facility]  AMBCHEMO  [address]  Scheduled Appointment: 08/29/2024

## 2024-08-28 ENCOUNTER — NON-APPOINTMENT (OUTPATIENT)
Age: 49
End: 2024-08-28

## 2024-08-28 ENCOUNTER — APPOINTMENT (OUTPATIENT)
Dept: HEMATOLOGY ONCOLOGY | Facility: CLINIC | Age: 49
End: 2024-08-28
Payer: COMMERCIAL

## 2024-08-28 ENCOUNTER — OUTPATIENT (OUTPATIENT)
Dept: OUTPATIENT SERVICES | Facility: HOSPITAL | Age: 49
LOS: 1 days | End: 2024-08-28
Payer: COMMERCIAL

## 2024-08-28 ENCOUNTER — APPOINTMENT (OUTPATIENT)
Dept: INFUSION THERAPY | Facility: CLINIC | Age: 49
End: 2024-08-28

## 2024-08-28 VITALS
DIASTOLIC BLOOD PRESSURE: 72 MMHG | TEMPERATURE: 98 F | HEART RATE: 80 BPM | SYSTOLIC BLOOD PRESSURE: 118 MMHG | WEIGHT: 134.04 LBS | OXYGEN SATURATION: 99 % | RESPIRATION RATE: 16 BRPM | HEIGHT: 67 IN

## 2024-08-28 VITALS
BODY MASS INDEX: 21.19 KG/M2 | HEART RATE: 83 BPM | DIASTOLIC BLOOD PRESSURE: 76 MMHG | OXYGEN SATURATION: 97 % | WEIGHT: 135 LBS | SYSTOLIC BLOOD PRESSURE: 125 MMHG | HEIGHT: 67 IN | RESPIRATION RATE: 18 BRPM | TEMPERATURE: 97.8 F

## 2024-08-28 VITALS
SYSTOLIC BLOOD PRESSURE: 122 MMHG | HEART RATE: 78 BPM | OXYGEN SATURATION: 98 % | RESPIRATION RATE: 17 BRPM | TEMPERATURE: 98 F | DIASTOLIC BLOOD PRESSURE: 74 MMHG

## 2024-08-28 DIAGNOSIS — K92.2 GASTROINTESTINAL HEMORRHAGE, UNSPECIFIED: ICD-10-CM

## 2024-08-28 DIAGNOSIS — C16.9 MALIGNANT NEOPLASM OF STOMACH, UNSPECIFIED: ICD-10-CM

## 2024-08-28 DIAGNOSIS — D50.0 IRON DEFICIENCY ANEMIA SECONDARY TO BLOOD LOSS (CHRONIC): ICD-10-CM

## 2024-08-28 DIAGNOSIS — Z98.890 OTHER SPECIFIED POSTPROCEDURAL STATES: Chronic | ICD-10-CM

## 2024-08-28 DIAGNOSIS — D50.9 IRON DEFICIENCY ANEMIA, UNSPECIFIED: ICD-10-CM

## 2024-08-28 LAB
ALBUMIN SERPL ELPH-MCNC: 3.1 G/DL
ALP BLD-CCNC: 82 U/L
ALT SERPL-CCNC: 41 U/L
ANION GAP SERPL CALC-SCNC: -1 MMOL/L
AST SERPL-CCNC: 27 U/L
BILIRUB SERPL-MCNC: 0.5 MG/DL
BUN SERPL-MCNC: 13 MG/DL
CALCIUM SERPL-MCNC: 9.4 MG/DL
CHLORIDE SERPL-SCNC: 113 MMOL/L
CO2 SERPL-SCNC: 29 MMOL/L
CREAT SERPL-MCNC: 0.7 MG/DL
EGFR: 112 ML/MIN/1.73M2
GLUCOSE SERPL-MCNC: 280 MG/DL
HCT VFR BLD CALC: 26.4 %
HGB BLD-MCNC: 8.2 G/DL
LYMPHOCYTES # BLD AUTO: 1.1 K/UL
LYMPHOCYTES NFR BLD AUTO: 3.1 %
MAGNESIUM SERPL-MCNC: 2.1 MG/DL
MAN DIFF?: NO
MCHC RBC-ENTMCNC: 27.8 PG
MCHC RBC-ENTMCNC: 31.1 GM/DL
MCV RBC AUTO: 89.5 FL
NEUTROPHILS # BLD AUTO: 31.9 K/UL
NEUTROPHILS NFR BLD AUTO: 93.3 %
PLATELET # BLD AUTO: 237 K/UL
POTASSIUM SERPL-SCNC: 5 MMOL/L
PROT SERPL-MCNC: 5.8 G/DL
RBC # BLD: 2.95 M/UL
RBC # FLD: 26.6 %
SODIUM SERPL-SCNC: 141 MMOL/L
WBC # FLD AUTO: 34.2 K/UL

## 2024-08-28 PROCEDURE — 96413 CHEMO IV INFUSION 1 HR: CPT

## 2024-08-28 PROCEDURE — 96367 TX/PROPH/DG ADDL SEQ IV INF: CPT

## 2024-08-28 PROCEDURE — 36415 COLL VENOUS BLD VENIPUNCTURE: CPT

## 2024-08-28 PROCEDURE — 96375 TX/PRO/DX INJ NEW DRUG ADDON: CPT

## 2024-08-28 PROCEDURE — 96415 CHEMO IV INFUSION ADDL HR: CPT

## 2024-08-28 PROCEDURE — 99215 OFFICE O/P EST HI 40 MIN: CPT | Mod: 25

## 2024-08-28 PROCEDURE — 96416 CHEMO PROLONG INFUSE W/PUMP: CPT

## 2024-08-28 RX ORDER — LIDOCAINE/BENZALKONIUM/ALCOHOL
1 SOLUTION, NON-ORAL TOPICAL ONCE
Refills: 0 | Status: COMPLETED | OUTPATIENT
Start: 2024-08-28 | End: 2024-08-28

## 2024-08-28 RX ORDER — FLUOROURACIL 50 MG/ML
4400 VIAL (ML) INTRAVENOUS ONCE
Refills: 0 | Status: COMPLETED | OUTPATIENT
Start: 2024-08-28 | End: 2024-08-28

## 2024-08-28 RX ORDER — DEXAMETHASONE 0.75 MG
10 TABLET ORAL ONCE
Refills: 0 | Status: COMPLETED | OUTPATIENT
Start: 2024-08-28 | End: 2024-08-28

## 2024-08-28 RX ORDER — FOSAPREPITANT DIMEGLUMINE 150 MG/5ML
150 INJECTION, POWDER, LYOPHILIZED, FOR SOLUTION INTRAVENOUS ONCE
Refills: 0 | Status: COMPLETED | OUTPATIENT
Start: 2024-08-28 | End: 2024-08-28

## 2024-08-28 RX ORDER — OXALIPLATIN 5 MG/ML
145 INJECTION, SOLUTION INTRAVENOUS ONCE
Refills: 0 | Status: COMPLETED | OUTPATIENT
Start: 2024-08-28 | End: 2024-08-28

## 2024-08-28 RX ORDER — SUCRALFATE 1 G/1
1 TABLET ORAL 4 TIMES DAILY
Refills: 0 | Status: ACTIVE | COMMUNITY
Start: 2024-08-28

## 2024-08-28 RX ORDER — DOCETAXEL 80 MG/4ML
85 INJECTION, SOLUTION, CONCENTRATE INTRAVENOUS ONCE
Refills: 0 | Status: COMPLETED | OUTPATIENT
Start: 2024-08-28 | End: 2024-08-28

## 2024-08-28 RX ORDER — LEUCOVORIN CALCIUM 100 MG/10ML
340 INJECTION, POWDER, LYOPHILIZED, FOR SOLUTION INTRAMUSCULAR; INTRAVENOUS ONCE
Refills: 0 | Status: COMPLETED | OUTPATIENT
Start: 2024-08-28 | End: 2024-08-28

## 2024-08-28 RX ORDER — PALONOSETRON HYDROCHLORIDE 0.25 MG/5ML
0.25 INJECTION INTRAVENOUS ONCE
Refills: 0 | Status: COMPLETED | OUTPATIENT
Start: 2024-08-28 | End: 2024-08-28

## 2024-08-28 RX ADMIN — FOSAPREPITANT DIMEGLUMINE 150 MILLIGRAM(S): 150 INJECTION, POWDER, LYOPHILIZED, FOR SOLUTION INTRAVENOUS at 10:30

## 2024-08-28 RX ADMIN — Medication 204 MILLIGRAM(S): at 10:35

## 2024-08-28 RX ADMIN — LEUCOVORIN CALCIUM 340 MILLIGRAM(S): 100 INJECTION, POWDER, LYOPHILIZED, FOR SOLUTION INTRAMUSCULAR; INTRAVENOUS at 14:00

## 2024-08-28 RX ADMIN — OXALIPLATIN 145 MILLIGRAM(S): 5 INJECTION, SOLUTION INTRAVENOUS at 14:00

## 2024-08-28 RX ADMIN — DOCETAXEL 85 MILLIGRAM(S): 80 INJECTION, SOLUTION, CONCENTRATE INTRAVENOUS at 10:50

## 2024-08-28 RX ADMIN — PALONOSETRON HYDROCHLORIDE 0.25 MILLIGRAM(S): 0.25 INJECTION INTRAVENOUS at 09:57

## 2024-08-28 RX ADMIN — DOCETAXEL 85 MILLIGRAM(S): 80 INJECTION, SOLUTION, CONCENTRATE INTRAVENOUS at 11:50

## 2024-08-28 RX ADMIN — OXALIPLATIN 145 MILLIGRAM(S): 5 INJECTION, SOLUTION INTRAVENOUS at 11:55

## 2024-08-28 RX ADMIN — Medication 10 MILLIGRAM(S): at 10:49

## 2024-08-28 RX ADMIN — LEUCOVORIN CALCIUM 340 MILLIGRAM(S): 100 INJECTION, POWDER, LYOPHILIZED, FOR SOLUTION INTRAMUSCULAR; INTRAVENOUS at 11:55

## 2024-08-28 RX ADMIN — Medication 4400 MILLIGRAM(S): at 14:15

## 2024-08-28 RX ADMIN — FOSAPREPITANT DIMEGLUMINE 500 MILLIGRAM(S): 150 INJECTION, POWDER, LYOPHILIZED, FOR SOLUTION INTRAVENOUS at 09:57

## 2024-08-28 NOTE — REASON FOR VISIT
[Follow-Up Visit] : a follow-up [Spouse] : spouse [FreeTextEntry2] : FLOT chemotherapy for gastric adenocarcinoma

## 2024-08-28 NOTE — REVIEW OF SYSTEMS
[Fever] : no fever [Chills] : no chills [Night Sweats] : no night sweats [Eye Pain] : no eye pain [Dysphagia] : no dysphagia [Odynophagia] : no odynophagia [Mucosal Pain] : no mucosal pain [Chest Pain] : no chest pain [Palpitations] : no palpitations [Shortness Of Breath] : no shortness of breath [Wheezing] : no wheezing [Abdominal Pain] : no abdominal pain [Constipation] : no constipation [Dysuria] : no dysuria [Joint Pain] : no joint pain [Difficulty Walking] : no difficulty walking [Anxiety] : no anxiety [Depression] : no depression [Muscle Weakness] : no muscle weakness [Easy Bleeding] : no tendency for easy bleeding [Easy Bruising] : no tendency for easy bruising [FreeTextEntry7] : interim black emesis and melena, now resolved [de-identified] : bruising at prior IV sites on arms [de-identified] : interim dizziness and syncope, now resolved

## 2024-08-28 NOTE — END OF VISIT
[] : Fellow [FreeTextEntry3] : I evaluated the patient with the Oncology fellow, Dr Ontiveros.  the patient for cycle 2 of neoadjuvant chemotherapy with FLOT.  cycle 1 was initiated on 8/14/24.  Unfortunately, one week into treatment he developed hematemesis and syncope; transported by EMS to [facility] where he was hospitalized for 4 days.  He did require pRBC transfusion but no other more aggressive intervention.  Today, he denies any concerns for blood loss.  Actually feels pretty good.  Did not have any other acute or serious chemo-related toxicity.    CBC and CMP reviewed.  He has a significant leukocytosis - most likely related to dexamethasone and neulasta administration (was given with C1 of chemotherapy).  Hemoglobin has improved slightly from 7.6 at discharge on 8/26, to 8.2 today.  Glucose elevated at 280, most likely due to dexamethasone.  Plan is to proceed w/ C2 of FLOT today.  If there are any further concerns for bleeding, will abort chemotherapy and pursue surgical resection of the cancer instead. Mid cycle CBC check requested on or around 9/3/24.  to monitor the H/H Reminded him that if there are concerns for bleeding, he should seek emergency care. Continue pantoprazole 40 mg daily.   ossible risks, side effects of chemotherapy at length.  informed consent for treatment obtained.  RTC in 2 weeks for C2 of FLOT.  Trend CBC for OSIRIS and repeat iron studies w/ C4.  Complete antibiotics for H pylori.

## 2024-08-28 NOTE — END OF VISIT
[] : Fellow [FreeTextEntry3] : I evaluated the patient with the Oncology fellow, Dr Ontiveros.  the patient for cycle 2 of neoadjuvant chemotherapy with FLOT.  cycle 1 was initiated on 8/14/24.  Unfortunately, one week into treatment he developed hematemesis and syncope; transported by EMS to [facility] where he was hospitalized for 4 days.  He did require pRBC transfusion but no other more aggressive intervention.  Today, he denies any concerns for blood loss.  Actually feels pretty good.  Did not have any other acute or serious chemo-related toxicity.    CBC and CMP reviewed.  He has a significant leukocytosis - most likely related to dexamethasone and neulasta administration (was given with C1 of chemotherapy).  Hemoglobin has improved slightly from 7.6 at discharge on 8/26, to 8.2 today.  Glucose elevated at 280, most likely due to dexamethasone.  Plan is to proceed w/ C2 of FLOT today.  If there are any further concerns for bleeding, will abort chemotherapy and pursue surgical resection of the cancer instead. Mid cycle CBC check requested on or around 9/3/24.  to monitor the H/H Reminded him that if there are concerns for bleeding, he should seek emergency care. Continue pantoprazole 40 mg daily.   ossible risks, side effects of chemotherapy at length.  informed consent for treatment obtained.  RTC in 2 weeks for C2 of FLOT.  Trend CBC for OSIRIS and repeat iron studies w/ C4.  Complete antibiotics for H pylori.

## 2024-08-28 NOTE — HISTORY OF PRESENT ILLNESS
[FreeTextEntry1] : FLOT C1D1 (8/14/24) Hospitalization (8/21/24-8/26/24) for symptomatic anemia 2/2 suspected UGIB FLOT C2D1 (8/28/24) [de-identified] : Isrrael Cortez is a 49 year old male here for follow up of gastric cancer.   Oncology history: 1.  gastric adenocarcinoma -presented to [facility] ER on 7/11/24 with severe symptomatic anemia, epigastric pain, and weight loss of 20lbs over 3 months.  s/p 3 units of PRBC during admission.  labs confirmed iron deficiency - CT AP on 7/11/24 showed mural thickening at the gastric antrum may represent gastritis or gastric cancer.  0.9 cm sclerotic focus in the left iliac bone may represent a bone island.  - s/p EGD with a biopsy on 7/12/24, notable for large ulcerated mass on lesser curvature of stomach, c/w malignancy. No active bleeding.  Biopsy of the gastric mass confirmed adenocarcinoma, moderately differentiated in background of ulceration and Helicobacter pylori associated gastritis and intestinal metaplasia, MMR intact, HER2/khang:  1+ score, negative.  PD-L1/CPS : 4 - CT chest on 7/25/24 (at [facility] due to insurance reasons): no evidence of metastatic disease in chest  - Diagnostic laparoscopy (Dr. Carrasco) on 7/31/24, notable for tumor along lesser curvature, clinically T2-T3 disease. Peritoneal washings with rare groups of atypical, degenerated cells.  No peritoneal carcinomatosis. - neoadjuvant FLOT chemotherapy initiated 8/14/24.  C1 complicated by hospitalization 8/21/24 - 8/26/24, for symptomatic anemia 2/2 suspected UGIB. s/p pRBC x3, no other acute interventions required  PMH: HTN PSH:  none  FH: aunt with breast ca SH: nonsmoker, no drug/alcohol,  at high school,  (wife Cora) with 2 teenagers, lives in [place] [de-identified] : Presents for C2D1 of FLOT regimen. Accompanied by wife, Cora.  Pt was hospitalized at [facility] from 8/21/24-8/26/24 for syncope and symptomatic anemia 2/2 suspected UGIB.  On day prior to hospitalization, pt experienced FREIRE and fatigue, as well as pressure/humming sensation in his ears. On 8/21, pt walked to bathroom felt lightheaded and winded, vomited coffee ground emesis in bathroom sink. He then remembers falling onto the floor. Wife was downstairs, who heard a thud and found pt on floor face down, projectile vomiting coffee ground emesis. Duration of unconsciousness thought to be two minutes. Upon admission, pt received 2u pRBC and was started on Protonix gtt and sucralfate. Hgb 7.5 (last prev outpatient hgb 10.6). CTH was unremarkable. CTA abd/pel showed no extravasation into the GI tract to suggest active ongoing bleeding. GI and surgery were consulted, recommending to trend hgb. Pt had one additional black emesis on day 2 of hospitalization, hgb was 7.3 (charan), and was given a third unit pRBC. His counts remained stable and he was discharged home on PO Protonix and sucralfate. Hgb at discharge was 7.6.  Aside from his hospitalization, pt has not experienced any chemotherapy related adverse effects. He denies any fevers, sweats, chills, cold dysthesia, oral ulcers, abdominal pain, constipation, diarrhea, leg swelling, confusion. Since his discharge from the hospital, he continues to have improvement in melena (black/dark brown). No new emesis, lightheadedness, dizziness. No tinnitus. Appetite is good. Took dexamethasone yesterday and today.

## 2024-08-28 NOTE — PHYSICAL EXAM
[Restricted in physically strenuous activity but ambulatory and able to carry out work of a light or sedentary nature] : Status 1- Restricted in physically strenuous activity but ambulatory and able to carry out work of a light or sedentary nature, e.g., light house work, office work [de-identified] : Laparoscopy incision sites well healed [de-identified] : No cervical or supraclavicular lymphadenopathy noted [de-identified] : 5/5 strength in UE/LE [de-identified] : Cranial nerves grossly intact. [Normal] : normal spine exam without palpable tenderness, no kyphosis or scoliosis [de-identified] : Right chest wall port site CDI, no surrounding tenderness or signs of infection. Scattered ecchymoses at bilateral forearms from prior IV sites. Small nodule at right forearm at prior infiltrated IV site.

## 2024-08-28 NOTE — HISTORY OF PRESENT ILLNESS
[FreeTextEntry1] : FLOT C1D1 (8/14/24) Hospitalization (8/21/24-8/26/24) for symptomatic anemia 2/2 suspected UGIB FLOT C2D1 (8/28/24) [de-identified] : Isrrael Cortez is a 49 year old male here for follow up of gastric cancer.   Oncology history: 1.  gastric adenocarcinoma -presented to [facility] ER on 7/11/24 with severe symptomatic anemia, epigastric pain, and weight loss of 20lbs over 3 months.  s/p 3 units of PRBC during admission.  labs confirmed iron deficiency - CT AP on 7/11/24 showed mural thickening at the gastric antrum may represent gastritis or gastric cancer.  0.9 cm sclerotic focus in the left iliac bone may represent a bone island.  - s/p EGD with a biopsy on 7/12/24, notable for large ulcerated mass on lesser curvature of stomach, c/w malignancy. No active bleeding.  Biopsy of the gastric mass confirmed adenocarcinoma, moderately differentiated in background of ulceration and Helicobacter pylori associated gastritis and intestinal metaplasia, MMR intact, HER2/khang:  1+ score, negative.  PD-L1/CPS : 4 - CT chest on 7/25/24 (at [facility] due to insurance reasons): no evidence of metastatic disease in chest  - Diagnostic laparoscopy (Dr. Carrasco) on 7/31/24, notable for tumor along lesser curvature, clinically T2-T3 disease. Peritoneal washings with rare groups of atypical, degenerated cells.  No peritoneal carcinomatosis. - neoadjuvant FLOT chemotherapy initiated 8/14/24.  C1 complicated by hospitalization 8/21/24 - 8/26/24, for symptomatic anemia 2/2 suspected UGIB. s/p pRBC x3, no other acute interventions required  PMH: HTN PSH:  none  FH: aunt with breast ca SH: nonsmoker, no drug/alcohol,  at high school,  (wife Cora) with 2 teenagers, lives in [place] [de-identified] : Presents for C2D1 of FLOT regimen. Accompanied by wife, Cora.  Pt was hospitalized at [facility] from 8/21/24-8/26/24 for syncope and symptomatic anemia 2/2 suspected UGIB.  On day prior to hospitalization, pt experienced FREIRE and fatigue, as well as pressure/humming sensation in his ears. On 8/21, pt walked to bathroom felt lightheaded and winded, vomited coffee ground emesis in bathroom sink. He then remembers falling onto the floor. Wife was downstairs, who heard a thud and found pt on floor face down, projectile vomiting coffee ground emesis. Duration of unconsciousness thought to be two minutes. Upon admission, pt received 2u pRBC and was started on Protonix gtt and sucralfate. Hgb 7.5 (last prev outpatient hgb 10.6). CTH was unremarkable. CTA abd/pel showed no extravasation into the GI tract to suggest active ongoing bleeding. GI and surgery were consulted, recommending to trend hgb. Pt had one additional black emesis on day 2 of hospitalization, hgb was 7.3 (charan), and was given a third unit pRBC. His counts remained stable and he was discharged home on PO Protonix and sucralfate. Hgb at discharge was 7.6.  Aside from his hospitalization, pt has not experienced any chemotherapy related adverse effects. He denies any fevers, sweats, chills, cold dysthesia, oral ulcers, abdominal pain, constipation, diarrhea, leg swelling, confusion. Since his discharge from the hospital, he continues to have improvement in melena (black/dark brown). No new emesis, lightheadedness, dizziness. No tinnitus. Appetite is good. Took dexamethasone yesterday and today.

## 2024-08-28 NOTE — ASSESSMENT
[FreeTextEntry1] : Isrrael Cortez is a 49 year old man who presented with severe iron deficiency anemia. CT scan showed thickening of the gastric antrum concerning for malignancy.  EGD showed an ulcerated mass along the lesser curvature of the stomach; biopsy confirmed moderately differentiated adenocarcinoma, with H pylori organisms present, MMR intact by IHC. CT of the chest did not demonstrate any thoracic metastatic disease. Diagnostic laparoscopy on 7/31/24 showing clinical T2-T3 disease. Peritoneal washings with rare atypical cells.  Undergoing neoadjuvant chemotherapy with FLOT.  Plan: #  gastric cancer --diagnosis, imaging and pathology results reviewed w/ the patient and his wife. -- His case was reviewed in multidisciplinary fashion (in discussion w/ surgical oncologist Dr Carrasco as well as in tumor board).  Neoadjuvant chemotherapy with FLOT regimen followed by surgery was recommended. Following surgical resection of the cancer, plan for adjuvant chemotherapy. --prognosis discussed previously - treatment is being delivered with Curative intent.  Plan is for 4 cycles of neoadjuvant FLOT, if tolerated. --course complicated by hospitalization (8/21-8/26) for syncope and symptomatic anemia 2/2 suspected UGIB --CBC and CMP reviewed. hgb improved from discharge. cleared for treatment with C2D1 --discussed monitoring UGIB symptoms and criteria to return to ED for urgent evaluation if needed --repeat CBC in 1 week  #  iron deficiency anemia #  coffee ground emesis with melena --Labs from hospitalization 7/2024 confirmed iron deficiency --received 1g infed on 8/6/24, with improvement in counts --hospitalized for symptomatic anemia 2/2 suspected UGIB (8/21-8/26), received 3u pRBC --counts stable following discharge --will monitor for bleeding events and reassess iron closer to C4  # H pylori infection --completed triple therapy on 8/17/24 (PPI BID + amoxicillin 1 g BID + clarithromycin 500 mg po BID) --continuing daily PPI, also on sucralfate x1 month from hospitalization  #  weight loss, unintentional --dietician consulted --appetite improved following treatment of h. pylori, trending weight  Pump disconnect tomorrow  RTC in 2 weeks for C3D1  CBC in 1 week. Repeat CBC, CMP, Mag at next visit  Please see attending physician attestation below.

## 2024-08-28 NOTE — PHYSICAL EXAM
[Restricted in physically strenuous activity but ambulatory and able to carry out work of a light or sedentary nature] : Status 1- Restricted in physically strenuous activity but ambulatory and able to carry out work of a light or sedentary nature, e.g., light house work, office work [de-identified] : Laparoscopy incision sites well healed [de-identified] : No cervical or supraclavicular lymphadenopathy noted [de-identified] : 5/5 strength in UE/LE [de-identified] : Cranial nerves grossly intact. [Normal] : normal spine exam without palpable tenderness, no kyphosis or scoliosis [de-identified] : Right chest wall port site CDI, no surrounding tenderness or signs of infection. Scattered ecchymoses at bilateral forearms from prior IV sites. Small nodule at right forearm at prior infiltrated IV site.

## 2024-08-28 NOTE — REVIEW OF SYSTEMS
[Fever] : no fever [Chills] : no chills [Night Sweats] : no night sweats [Eye Pain] : no eye pain [Dysphagia] : no dysphagia [Odynophagia] : no odynophagia [Mucosal Pain] : no mucosal pain [Chest Pain] : no chest pain [Palpitations] : no palpitations [Shortness Of Breath] : no shortness of breath [Wheezing] : no wheezing [Abdominal Pain] : no abdominal pain [Constipation] : no constipation [Dysuria] : no dysuria [Joint Pain] : no joint pain [Difficulty Walking] : no difficulty walking [Anxiety] : no anxiety [Depression] : no depression [Muscle Weakness] : no muscle weakness [Easy Bleeding] : no tendency for easy bleeding [Easy Bruising] : no tendency for easy bruising [FreeTextEntry7] : interim black emesis and melena, now resolved [de-identified] : bruising at prior IV sites on arms [de-identified] : interim dizziness and syncope, now resolved

## 2024-08-28 NOTE — DISCHARGE INSTRUCTIONS: CHEMOTHERAPY - I ACKNOWLEDGE THAT I HAVE RECEIVED AND UNDERSTAND THE ABOVE INSTRUCTIONS AND AGREE TO BEING DISCHARGED TODAY
Spoke with pt's IMT team (Javi and Angeles) and they provided pt's medication list and outpt follow up progression  Statement Selected Spoke with pt's IMT team (Javi and Angeles) and they provided pt's medication list and outpt follow up progression  Spoke with pt's IMT team (Javi and Angeles) and they provided pt's medication list and outpt follow up progression  Spoke with pt's IMT team (Javi and Angeles) and they provided pt's medication list and outpt follow up progression  Spoke with pt's IMT team (Javi and Angeles) and they provided pt's medication list and outpt follow up progression  Spoke with pt's IMT team (Javi and Angeles) and they provided pt's medication list and outpt follow up progression  Spoke with pt's IMT team (Javi and Angeles) and they provided pt's medication list and outpt follow up progression  Spoke with pt's IMT team (Javi and Angeles) and they provided pt's medication list and outpt follow up progression  Spoke with pt's IMT team (Javi and Angeles) and they provided pt's medication list and outpt follow up progression  Spoke with pt's IMT team (Javi and Angeles) and they provided pt's medication list and outpt follow up progression

## 2024-08-29 ENCOUNTER — APPOINTMENT (OUTPATIENT)
Dept: INFUSION THERAPY | Facility: CLINIC | Age: 49
End: 2024-08-29

## 2024-08-29 ENCOUNTER — OUTPATIENT (OUTPATIENT)
Dept: OUTPATIENT SERVICES | Facility: HOSPITAL | Age: 49
LOS: 1 days | End: 2024-08-29
Payer: COMMERCIAL

## 2024-08-29 VITALS
OXYGEN SATURATION: 99 % | RESPIRATION RATE: 18 BRPM | DIASTOLIC BLOOD PRESSURE: 67 MMHG | SYSTOLIC BLOOD PRESSURE: 103 MMHG | HEART RATE: 74 BPM | TEMPERATURE: 97 F

## 2024-08-29 PROCEDURE — 96523 IRRIG DRUG DELIVERY DEVICE: CPT

## 2024-08-29 RX ORDER — SODIUM CHLORIDE 9 MG/ML
10 INJECTION INTRAMUSCULAR; INTRAVENOUS; SUBCUTANEOUS ONCE
Refills: 0 | Status: COMPLETED | OUTPATIENT
Start: 2024-08-29 | End: 2024-08-29

## 2024-08-29 RX ADMIN — SODIUM CHLORIDE 10 MILLILITER(S): 9 INJECTION INTRAMUSCULAR; INTRAVENOUS; SUBCUTANEOUS at 14:40

## 2024-08-30 DIAGNOSIS — K92.0 HEMATEMESIS: ICD-10-CM

## 2024-08-30 DIAGNOSIS — R73.03 PREDIABETES: ICD-10-CM

## 2024-08-30 DIAGNOSIS — T38.0X5A ADVERSE EFFECT OF GLUCOCORTICOIDS AND SYNTHETIC ANALOGUES, INITIAL ENCOUNTER: ICD-10-CM

## 2024-08-30 DIAGNOSIS — D63.0 ANEMIA IN NEOPLASTIC DISEASE: ICD-10-CM

## 2024-08-30 DIAGNOSIS — E78.5 HYPERLIPIDEMIA, UNSPECIFIED: ICD-10-CM

## 2024-08-30 DIAGNOSIS — R55 SYNCOPE AND COLLAPSE: ICD-10-CM

## 2024-08-30 DIAGNOSIS — Z79.631 LONG TERM (CURRENT) USE OF ANTIMETABOLITE AGENT: ICD-10-CM

## 2024-08-30 DIAGNOSIS — C16.5 MALIGNANT NEOPLASM OF LESSER CURVATURE OF STOMACH, UNSPECIFIED: ICD-10-CM

## 2024-08-30 DIAGNOSIS — K92.2 GASTROINTESTINAL HEMORRHAGE, UNSPECIFIED: ICD-10-CM

## 2024-08-30 DIAGNOSIS — E87.20 ACIDOSIS, UNSPECIFIED: ICD-10-CM

## 2024-08-30 DIAGNOSIS — Z79.899 OTHER LONG TERM (CURRENT) DRUG THERAPY: ICD-10-CM

## 2024-08-30 DIAGNOSIS — I10 ESSENTIAL (PRIMARY) HYPERTENSION: ICD-10-CM

## 2024-08-30 DIAGNOSIS — D72.829 ELEVATED WHITE BLOOD CELL COUNT, UNSPECIFIED: ICD-10-CM

## 2024-08-30 DIAGNOSIS — Z79.633: ICD-10-CM

## 2024-08-30 DIAGNOSIS — Z79.630 LONG TERM (CURRENT) USE OF ALKYLATING AGENT: ICD-10-CM

## 2024-08-30 DIAGNOSIS — D62 ACUTE POSTHEMORRHAGIC ANEMIA: ICD-10-CM

## 2024-09-03 ENCOUNTER — LABORATORY RESULT (OUTPATIENT)
Age: 49
End: 2024-09-03

## 2024-09-04 LAB
BASOPHILS # BLD AUTO: 0.05 K/UL
BASOPHILS NFR BLD AUTO: 0.6 %
EOSINOPHIL # BLD AUTO: 0.18 K/UL
EOSINOPHIL NFR BLD AUTO: 2.1 %
HCT VFR BLD CALC: 29 %
HGB BLD-MCNC: 8.8 G/DL
IMM GRANULOCYTES NFR BLD AUTO: 0.9 %
LYMPHOCYTES # BLD AUTO: 1.56 K/UL
LYMPHOCYTES NFR BLD AUTO: 18.3 %
MAN DIFF?: NORMAL
MCHC RBC-ENTMCNC: 28.2 PG
MCHC RBC-ENTMCNC: 30.3 GM/DL
MCV RBC AUTO: 92.9 FL
MONOCYTES # BLD AUTO: 0.37 K/UL
MONOCYTES NFR BLD AUTO: 4.3 %
NEUTROPHILS # BLD AUTO: 6.27 K/UL
NEUTROPHILS NFR BLD AUTO: 73.8 %
PLATELET # BLD AUTO: 283 K/UL
RBC # BLD: 3.12 M/UL
RBC # FLD: 24 %
WBC # FLD AUTO: 8.51 K/UL

## 2024-09-07 ENCOUNTER — INPATIENT (INPATIENT)
Facility: HOSPITAL | Age: 49
LOS: 1 days | Discharge: HOME | End: 2024-09-09
Attending: HOSPITALIST | Admitting: HOSPITALIST
Payer: COMMERCIAL

## 2024-09-07 VITALS
RESPIRATION RATE: 20 BRPM | OXYGEN SATURATION: 97 % | TEMPERATURE: 98 F | WEIGHT: 136.91 LBS | HEART RATE: 107 BPM | HEIGHT: 67 IN

## 2024-09-07 DIAGNOSIS — Z98.890 OTHER SPECIFIED POSTPROCEDURAL STATES: Chronic | ICD-10-CM

## 2024-09-07 DIAGNOSIS — E11.9 TYPE 2 DIABETES MELLITUS WITHOUT COMPLICATIONS: ICD-10-CM

## 2024-09-07 DIAGNOSIS — C16.9 MALIGNANT NEOPLASM OF STOMACH, UNSPECIFIED: ICD-10-CM

## 2024-09-07 DIAGNOSIS — D64.9 ANEMIA, UNSPECIFIED: ICD-10-CM

## 2024-09-07 DIAGNOSIS — R55 SYNCOPE AND COLLAPSE: ICD-10-CM

## 2024-09-07 LAB
ALBUMIN SERPL ELPH-MCNC: 2.5 G/DL — LOW (ref 3.3–5)
ALP SERPL-CCNC: 55 U/L — SIGNIFICANT CHANGE UP (ref 40–120)
ALT FLD-CCNC: 22 U/L — SIGNIFICANT CHANGE UP (ref 12–78)
ANION GAP SERPL CALC-SCNC: 6 MMOL/L — SIGNIFICANT CHANGE UP (ref 5–17)
ANISOCYTOSIS BLD QL: SLIGHT — SIGNIFICANT CHANGE UP
APTT BLD: 27.8 SEC — SIGNIFICANT CHANGE UP (ref 24.5–35.6)
AST SERPL-CCNC: 11 U/L — LOW (ref 15–37)
BASOPHILS # BLD AUTO: 0 K/UL — SIGNIFICANT CHANGE UP (ref 0–0.2)
BASOPHILS NFR BLD AUTO: 0 % — SIGNIFICANT CHANGE UP (ref 0–2)
BILIRUB SERPL-MCNC: 0.4 MG/DL — SIGNIFICANT CHANGE UP (ref 0.2–1.2)
BLD GP AB SCN SERPL QL: SIGNIFICANT CHANGE UP
BUN SERPL-MCNC: 29 MG/DL — HIGH (ref 7–23)
CALCIUM SERPL-MCNC: 8.2 MG/DL — LOW (ref 8.5–10.1)
CHLORIDE SERPL-SCNC: 108 MMOL/L — SIGNIFICANT CHANGE UP (ref 96–108)
CO2 SERPL-SCNC: 26 MMOL/L — SIGNIFICANT CHANGE UP (ref 22–31)
CREAT SERPL-MCNC: 0.88 MG/DL — SIGNIFICANT CHANGE UP (ref 0.5–1.3)
D DIMER BLD IA.RAPID-MCNC: 186 NG/ML DDU — SIGNIFICANT CHANGE UP
DACRYOCYTES BLD QL SMEAR: SLIGHT — SIGNIFICANT CHANGE UP
EGFR: 104 ML/MIN/1.73M2 — SIGNIFICANT CHANGE UP
EOSINOPHIL # BLD AUTO: 0.19 K/UL — SIGNIFICANT CHANGE UP (ref 0–0.5)
EOSINOPHIL NFR BLD AUTO: 2 % — SIGNIFICANT CHANGE UP (ref 0–6)
GLUCOSE BLDC GLUCOMTR-MCNC: 137 MG/DL — HIGH (ref 70–99)
GLUCOSE SERPL-MCNC: 226 MG/DL — HIGH (ref 70–99)
HCT VFR BLD CALC: 22.2 % — LOW (ref 39–50)
HGB BLD-MCNC: 6.9 G/DL — CRITICAL LOW (ref 13–17)
HYPOCHROMIA BLD QL: SIGNIFICANT CHANGE UP
INR BLD: 1.07 RATIO — SIGNIFICANT CHANGE UP (ref 0.85–1.18)
LYMPHOCYTES # BLD AUTO: 1.21 K/UL — SIGNIFICANT CHANGE UP (ref 1–3.3)
LYMPHOCYTES # BLD AUTO: 13 % — SIGNIFICANT CHANGE UP (ref 13–44)
MACROCYTES BLD QL: SIGNIFICANT CHANGE UP
MAGNESIUM SERPL-MCNC: 1.8 MG/DL — SIGNIFICANT CHANGE UP (ref 1.6–2.6)
MANUAL SMEAR VERIFICATION: SIGNIFICANT CHANGE UP
MCHC RBC-ENTMCNC: 28.2 PG — SIGNIFICANT CHANGE UP (ref 27–34)
MCHC RBC-ENTMCNC: 31.1 G/DL — LOW (ref 32–36)
MCV RBC AUTO: 90.6 FL — SIGNIFICANT CHANGE UP (ref 80–100)
MICROCYTES BLD QL: SLIGHT — SIGNIFICANT CHANGE UP
MONOCYTES # BLD AUTO: 1.59 K/UL — HIGH (ref 0–0.9)
MONOCYTES NFR BLD AUTO: 17 % — HIGH (ref 2–14)
NEUTROPHILS # BLD AUTO: 6.35 K/UL — SIGNIFICANT CHANGE UP (ref 1.8–7.4)
NEUTROPHILS NFR BLD AUTO: 60 % — SIGNIFICANT CHANGE UP (ref 43–77)
NEUTS BAND # BLD: 8 % — SIGNIFICANT CHANGE UP (ref 0–8)
NRBC # BLD: 0 /100 WBCS — SIGNIFICANT CHANGE UP (ref 0–0)
NRBC # BLD: SIGNIFICANT CHANGE UP /100 WBCS (ref 0–0)
OB PNL STL: NEGATIVE — SIGNIFICANT CHANGE UP
OVALOCYTES BLD QL SMEAR: SLIGHT — SIGNIFICANT CHANGE UP
PLAT MORPH BLD: NORMAL — SIGNIFICANT CHANGE UP
PLATELET # BLD AUTO: 342 K/UL — SIGNIFICANT CHANGE UP (ref 150–400)
POLYCHROMASIA BLD QL SMEAR: SLIGHT — SIGNIFICANT CHANGE UP
POTASSIUM SERPL-MCNC: 4.3 MMOL/L — SIGNIFICANT CHANGE UP (ref 3.5–5.3)
POTASSIUM SERPL-SCNC: 4.3 MMOL/L — SIGNIFICANT CHANGE UP (ref 3.5–5.3)
PROT SERPL-MCNC: 4.9 GM/DL — LOW (ref 6–8.3)
PROTHROM AB SERPL-ACNC: 12.8 SEC — SIGNIFICANT CHANGE UP (ref 9.5–13)
RBC # BLD: 2.45 M/UL — LOW (ref 4.2–5.8)
RBC # FLD: 23.1 % — HIGH (ref 10.3–14.5)
RBC BLD AUTO: ABNORMAL
SODIUM SERPL-SCNC: 140 MMOL/L — SIGNIFICANT CHANGE UP (ref 135–145)
TROPONIN I, HIGH SENSITIVITY RESULT: 6.4 NG/L — SIGNIFICANT CHANGE UP
WBC # BLD: 9.34 K/UL — SIGNIFICANT CHANGE UP (ref 3.8–10.5)
WBC # FLD AUTO: 9.34 K/UL — SIGNIFICANT CHANGE UP (ref 3.8–10.5)

## 2024-09-07 PROCEDURE — 99285 EMERGENCY DEPT VISIT HI MDM: CPT

## 2024-09-07 PROCEDURE — 93010 ELECTROCARDIOGRAM REPORT: CPT

## 2024-09-07 PROCEDURE — 99223 1ST HOSP IP/OBS HIGH 75: CPT

## 2024-09-07 PROCEDURE — 71045 X-RAY EXAM CHEST 1 VIEW: CPT | Mod: 26

## 2024-09-07 PROCEDURE — 70450 CT HEAD/BRAIN W/O DYE: CPT | Mod: 26,MC

## 2024-09-07 RX ORDER — MAGNESIUM, ALUMINUM HYDROXIDE 200-225/5
30 SUSPENSION, ORAL (FINAL DOSE FORM) ORAL EVERY 4 HOURS
Refills: 0 | Status: DISCONTINUED | OUTPATIENT
Start: 2024-09-07 | End: 2024-09-09

## 2024-09-07 RX ORDER — DEXTROSE 15 G/33 G
25 GEL IN PACKET (GRAM) ORAL ONCE
Refills: 0 | Status: DISCONTINUED | OUTPATIENT
Start: 2024-09-07 | End: 2024-09-09

## 2024-09-07 RX ORDER — SODIUM CHLORIDE 9 MG/ML
1000 INJECTION INTRAMUSCULAR; INTRAVENOUS; SUBCUTANEOUS ONCE
Refills: 0 | Status: COMPLETED | OUTPATIENT
Start: 2024-09-07 | End: 2024-09-07

## 2024-09-07 RX ORDER — ONDANSETRON 2 MG/ML
4 INJECTION, SOLUTION INTRAMUSCULAR; INTRAVENOUS EVERY 8 HOURS
Refills: 0 | Status: DISCONTINUED | OUTPATIENT
Start: 2024-09-07 | End: 2024-09-09

## 2024-09-07 RX ORDER — DEXTROSE 15 G/33 G
15 GEL IN PACKET (GRAM) ORAL ONCE
Refills: 0 | Status: DISCONTINUED | OUTPATIENT
Start: 2024-09-07 | End: 2024-09-09

## 2024-09-07 RX ORDER — ACETAMINOPHEN 325 MG/1
650 TABLET ORAL EVERY 6 HOURS
Refills: 0 | Status: DISCONTINUED | OUTPATIENT
Start: 2024-09-07 | End: 2024-09-09

## 2024-09-07 RX ORDER — GLUCAGON INJECTION, SOLUTION 1 MG/.2ML
1 INJECTION, SOLUTION SUBCUTANEOUS ONCE
Refills: 0 | Status: DISCONTINUED | OUTPATIENT
Start: 2024-09-07 | End: 2024-09-09

## 2024-09-07 RX ADMIN — SODIUM CHLORIDE 1000 MILLILITER(S): 9 INJECTION INTRAMUSCULAR; INTRAVENOUS; SUBCUTANEOUS at 09:52

## 2024-09-07 RX ADMIN — SODIUM CHLORIDE 16.67 MILLILITER(S): 9 INJECTION INTRAMUSCULAR; INTRAVENOUS; SUBCUTANEOUS at 12:37

## 2024-09-07 RX ADMIN — SODIUM CHLORIDE 1000 MILLILITER(S): 9 INJECTION INTRAMUSCULAR; INTRAVENOUS; SUBCUTANEOUS at 13:37

## 2024-09-07 RX ADMIN — SODIUM CHLORIDE 1000 MILLILITER(S): 9 INJECTION INTRAMUSCULAR; INTRAVENOUS; SUBCUTANEOUS at 10:52

## 2024-09-07 NOTE — ED PROVIDER NOTE - OBJECTIVE STATEMENT
48 y/o male with stomach ca s/p chemo , last chem 1 week ago, htn here with syncopal episode today. Pt states he woke up and felt dizzy and then had a syncopal episode witnessed by wife. Denies head trauma. Wife states pt passed out for about 1 min. When EMS arrived pt's bp was 60/40 and was given fluids. Denies fever, chest pain, dyspnea, nausea, vomiting, abdominal pain, headache. Pt denies any black stool.

## 2024-09-07 NOTE — ED ADULT TRIAGE NOTE - CHIEF COMPLAINT QUOTE
Chemo for stomach Cancer, syncope today 60/40 upon EMS arrival color grossly pale, IV left AC  Chemo for stomach Cancer, syncope today 60/40 upon EMS arrival color grossly pale, IV left AC , IV not working and no palpable BP at triage

## 2024-09-07 NOTE — H&P ADULT - PROBLEM SELECTOR PLAN 2
Dizziness with syncope   In the ED H/H:6.9/22.2   - Tele   - 2PRBC   - Monitor H/H to keep Hgb>8  - DVT prophylaxis (SCD)

## 2024-09-07 NOTE — ED PROVIDER NOTE - PHYSICAL EXAMINATION
GEN: Awake, alert, interactive, NAD. Pale  HEAD AND NECK: NC/AT. Airway patent. Neck supple.   EYES:  Clear b/l. EOMI. PERRL.   ENT: Moist mucus membranes.   CARDIAC: Regular rate, regular rhythm. No evident pedal edema.    RESP/CHEST: Normal respiratory effort with no use of accessory muscles or retractions. Clear throughout on auscultation.  ABD: soft, non-distended, non-tender. No rebound, no guarding.   BACK: No midline spinal TTP. No CVAT.   EXTREMITIES: Moving all extremities with no apparent deformities.   SKIN: Warm, dry, intact normal color. No rash.   NEURO: AOx3, CN II-XII grossly intact, no focal deficits.   PSYCH: Appropriate mood and affect. GEN: Awake, alert, interactive, NAD. Pale  HEAD AND NECK: NC/AT. Airway patent. Neck supple.   EYES:  Clear b/l. EOMI. PERRL.   ENT: Moist mucus membranes.   CARDIAC: Regular rate, regular rhythm. No evident pedal edema.    RESP/CHEST: Normal respiratory effort with no use of accessory muscles or retractions. Clear throughout on auscultation.  ABD: soft, non-distended, non-tender. No rebound, no guarding.   RECTAL: (+) scant light brown stool.   BACK: No midline spinal TTP. No CVAT.   EXTREMITIES: Moving all extremities with no apparent deformities.   SKIN: Warm, dry, intact normal color. No rash.   NEURO: AOx3, CN II-XII grossly intact, no focal deficits.   PSYCH: Appropriate mood and affect.

## 2024-09-07 NOTE — H&P ADULT - ASSESSMENT
49 year old male with a PMH of HTN, pre-DM, Gastric ca s/p chemo (last session 8/28) presents to the ED for syncopal episode. Patient was admitted 8/21-8/26 for syncope & 7/11-7/13 for symptomatic anemia. Endorses this morning upon waken up to go to the bathroom started to feel dizzy, returned to bed, sat at the end and fell over landed on the wife in bed with LOC for about 1 minute. On the field patient was found to be hypotensive by EMS with BP:60/40, placed on IV-fluid. Upon Evaluation, endorses he is feeling well, denies chest pain, palpitation, SOB, headache, dizziness, bloody stool, hematuria, hemoptysis, or any other acute symptoms. In the ED labs remarkable for H/H: 6.9/22.2. BP: 108/90 HR:108, Temp:98.1

## 2024-09-07 NOTE — H&P ADULT - NSHPPHYSICALEXAM_GEN_ALL_CORE
GENERAL: NAD, comfortable in bed  HEAD:  Atraumatic, Normocephalic  EYES: EOMI, PERRL, conjunctiva and sclera clear  NECK: Supple, No JVD  LUNG: Clear to auscultation bilaterally; No wheezes, rales or rhonchi  HEART: Regular rate and rhythm; No murmurs, rubs, or gallops  ABDOMEN: Soft, Nontender, Nondistended  EXTREMITIES: No clubbing, cyanosis, or edema  PSYCH: normal mood and affect  NEUROLOGY: AAOx3, non-focal   SKIN: No rashes or lesions

## 2024-09-07 NOTE — H&P ADULT - PROBLEM SELECTOR PLAN 3
Pt newly Dx type 1 was admitted in [address] on 5/7/23-5/9/23 had appt with Dh in the [facility] called the medical record at hospital spoke to Donavon Velasco looked pt up and confirmed pt was at the hospital over the weekend.  Asked for fax number to send over medical release form to us at [phone] confirmed   Sending form to scan kept copy in folder Diagnosed with Gastric Ca in 7/2024 (Had ulcerated mass).   Last EGD revealed H Pylori and pt was treated with Amox and Biaxin.  Completed Chemo (Last session 8/28)   No previous Colonoscopy   Plan for surgery to be scheduled in october

## 2024-09-07 NOTE — ED PROVIDER NOTE - NS ED ATTENDING STATEMENT MOD
I have seen and examined this patient and fully participated in the care of this patient as the teaching attending.  The service was shared with the EAR[facility].  I reviewed and verified the documentation.

## 2024-09-07 NOTE — H&P ADULT - PROBLEM SELECTOR PLAN 1
Witnessed syncopal episode   Most likely 2/2 low hemoglobin  CT-Head: No acute intracranial hemorrhage, mass effect, or midline shift.  - Tele   - Fall precaution   - Monitor

## 2024-09-07 NOTE — ED ADULT NURSE NOTE - NSFALLHARMRISKINTERV_ED_ALL_ED

## 2024-09-07 NOTE — ED ADULT NURSE NOTE - OBJECTIVE STATEMENT
50 yo male, A&Ox4, BIBEMS from home s/o syncopal episode after walking to bathroom. Per EMS unable to obtain blood pressure when they arrived to home. Patient was alert and speaking. tachycardic. On arrival to bed patient alert, speaking /90.  PMH: Stomach cancer

## 2024-09-07 NOTE — ED PROVIDER NOTE - ATTENDING CONTRIBUTION TO CARE
Pt is alert and oriented x 3 smiling appears very comfortable no focal neuro deficits I have personally seen and examined this pt I have fully participated in the care of this pt I have made amendments to the documentations where is appropriate and otherwise hx, examine and plants are documented by the acp. Pt's symptoms most likely due to anemia pt however sts he has brown stool and no vomiting no abd pain ct head no acute finding and d-dimer is normal.

## 2024-09-07 NOTE — ED PROVIDER NOTE - WR ORDER NAME 1
Xray Chest 1 View- PORTABLE-Urgent Perilesional Excision Additional Text (Leave Blank If You Do Not Want): The margin was drawn around the clinically apparent lesion. Incisions were then made along these lines to the appropriate tissue plane and the lesion was extirpated.

## 2024-09-07 NOTE — ED ADULT NURSE NOTE - CHIEF COMPLAINT QUOTE
Chemo for stomach Cancer, syncope today 60/40 upon EMS arrival color grossly pale, IV left AC , IV not working and no palpable BP at triage

## 2024-09-07 NOTE — ED PROVIDER NOTE - CLINICAL SUMMARY MEDICAL DECISION MAKING FREE TEXT BOX
48 y/o male with stomach ca s/p chemo here with syncopal episode and hypotensive. Likely vasovagal syncope possibly due to symptomatic anemia, PE.   /90. Mildly tachy   Will obtain basic labs, ivf, ekg, ct head and possibly cta chest r/o PE. 48 y/o male with stomach ca s/p chemo here with syncopal episode and hypotensive. Likely vasovagal syncope possibly due to symptomatic anemia, PE.   /90. Mildly tachy   Will obtain basic labs, ivf, ekg, ct head and possibly cta chest r/o PE.    labs reviewed and hgb 6.9. Guaiac negative.   Will transfuse 2 units and will admit pt to the hospital for syncope, hypotensive and symptomatic anemia.

## 2024-09-07 NOTE — H&P ADULT - HISTORY OF PRESENT ILLNESS
49 year old male with a PMH of HTN, pre-DM, Gastric ca s/p chemo (last session 8/28) presents to the ED for syncopal episode. Patient was admitted 8/21-8/26 for syncope & 7/11-7/13 for symptomatic anemia. Endorses this morning upon waken up to go to the bathroom started to feel dizzy, returned to bed, sat at the end and fell over landed on the wife in bed with LOC for about 1 minute. On the field patient was found to be hypotensive by EMS with BP:60/40, placed on IV-fluid. Upon Evaluation, endorses he is feeling well, denies chest pain, palpitation, SOB, headache, dizziness, bloody stool, hematuria, hemoptysis, or any other acute symptoms. In the ED labs remarkable for H/H: 6.9/22.2. BP: 108/90 HR:108, Temp:98.1  CT-Head: No acute intracranial hemorrhage, mass effect, or midline shift.

## 2024-09-07 NOTE — ED PROVIDER NOTE - CARE PLAN
1 Principal Discharge DX:	Syncope   Principal Discharge DX:	Syncope  Secondary Diagnosis:	Low hemoglobin

## 2024-09-07 NOTE — H&P ADULT - NSHPLABSRESULTS_GEN_ALL_CORE
6.9    9.34  )-----------( 342      ( 07 Sep 2024 09:40 )             22.2     140  |  108  |  29<H>  ----------------------------<  226<H>     09-07  4.3   |  26  |  0.88    Ca    8.2<L>      07 Sep 2024 09:40  Mg     1.8     09-07    TPro  4.9<L>  /  Alb  2.5<L>  /  TBili  0.4  /  DBili  x   /  AST  11<L>  /  ALT  22  /  AlkPhos  55  09-07    PT/INR: 12.8/1.07 (09-07-24 @ 09:40)  PTT: 27.8 (09-07-24 @ 09:40)      CT-Head  VENTRICLES AND SULCI: Age appropriate involutional changes.  INTRA-AXIAL: No mass effect, acute hemorrhage, or midline shift.  EXTRA-AXIAL: No mass or fluid collection. Basal cisterns are normal in appearance.    VISUALIZED SINUSES: Clear.  TYMPANOMASTOID CAVITIES: Clear.  VISUALIZED ORBITS: Normal.  CALVARIUM: Intact.    MISCELLANEOUS: None.      IMPRESSION:  No acute intracranial hemorrhage, mass effect, or midline shift.

## 2024-09-07 NOTE — ED ADULT NURSE NOTE - ED STAT RN HANDOFF DETAILS
report given to NAYA Beatty on 1C. pt stable and demonstrates no s/s of acute distress at this time. IV [place] (2) secure and patent. NSR, RA. safety maintained.

## 2024-09-08 LAB
A1C WITH ESTIMATED AVERAGE GLUCOSE RESULT: 5.2 % — SIGNIFICANT CHANGE UP (ref 4–5.6)
ALBUMIN SERPL ELPH-MCNC: 2.5 G/DL — LOW (ref 3.3–5)
ALP SERPL-CCNC: 48 U/L — SIGNIFICANT CHANGE UP (ref 40–120)
ALT FLD-CCNC: 19 U/L — SIGNIFICANT CHANGE UP (ref 12–78)
ANION GAP SERPL CALC-SCNC: 1 MMOL/L — LOW (ref 5–17)
AST SERPL-CCNC: 12 U/L — LOW (ref 15–37)
BILIRUB SERPL-MCNC: 0.4 MG/DL — SIGNIFICANT CHANGE UP (ref 0.2–1.2)
BUN SERPL-MCNC: 15 MG/DL — SIGNIFICANT CHANGE UP (ref 7–23)
CALCIUM SERPL-MCNC: 8.2 MG/DL — LOW (ref 8.5–10.1)
CHLORIDE SERPL-SCNC: 114 MMOL/L — HIGH (ref 96–108)
CHOLEST SERPL-MCNC: 115 MG/DL — SIGNIFICANT CHANGE UP
CO2 SERPL-SCNC: 27 MMOL/L — SIGNIFICANT CHANGE UP (ref 22–31)
CREAT SERPL-MCNC: 0.58 MG/DL — SIGNIFICANT CHANGE UP (ref 0.5–1.3)
EGFR: 118 ML/MIN/1.73M2 — SIGNIFICANT CHANGE UP
ESTIMATED AVERAGE GLUCOSE: 103 MG/DL — SIGNIFICANT CHANGE UP (ref 68–114)
GLUCOSE BLDC GLUCOMTR-MCNC: 100 MG/DL — HIGH (ref 70–99)
GLUCOSE BLDC GLUCOMTR-MCNC: 116 MG/DL — HIGH (ref 70–99)
GLUCOSE BLDC GLUCOMTR-MCNC: 118 MG/DL — HIGH (ref 70–99)
GLUCOSE BLDC GLUCOMTR-MCNC: 199 MG/DL — HIGH (ref 70–99)
GLUCOSE BLDC GLUCOMTR-MCNC: 94 MG/DL — SIGNIFICANT CHANGE UP (ref 70–99)
GLUCOSE SERPL-MCNC: 126 MG/DL — HIGH (ref 70–99)
HCT VFR BLD CALC: 25 % — LOW (ref 39–50)
HDLC SERPL-MCNC: 45 MG/DL — SIGNIFICANT CHANGE UP
HGB BLD-MCNC: 8.1 G/DL — LOW (ref 13–17)
LIPID PNL WITH DIRECT LDL SERPL: 57 MG/DL — SIGNIFICANT CHANGE UP
MCHC RBC-ENTMCNC: 28.2 PG — SIGNIFICANT CHANGE UP (ref 27–34)
MCHC RBC-ENTMCNC: 32.4 G/DL — SIGNIFICANT CHANGE UP (ref 32–36)
MCV RBC AUTO: 87.1 FL — SIGNIFICANT CHANGE UP (ref 80–100)
NON HDL CHOLESTEROL: 70 MG/DL — SIGNIFICANT CHANGE UP
NRBC # BLD: 0 /100 WBCS — SIGNIFICANT CHANGE UP (ref 0–0)
PLATELET # BLD AUTO: 214 K/UL — SIGNIFICANT CHANGE UP (ref 150–400)
POTASSIUM SERPL-MCNC: 4.2 MMOL/L — SIGNIFICANT CHANGE UP (ref 3.5–5.3)
POTASSIUM SERPL-SCNC: 4.2 MMOL/L — SIGNIFICANT CHANGE UP (ref 3.5–5.3)
PROT SERPL-MCNC: 4.9 GM/DL — LOW (ref 6–8.3)
RBC # BLD: 2.87 M/UL — LOW (ref 4.2–5.8)
RBC # FLD: 19.8 % — HIGH (ref 10.3–14.5)
SODIUM SERPL-SCNC: 142 MMOL/L — SIGNIFICANT CHANGE UP (ref 135–145)
TRIGL SERPL-MCNC: 58 MG/DL — SIGNIFICANT CHANGE UP
WBC # BLD: 4.78 K/UL — SIGNIFICANT CHANGE UP (ref 3.8–10.5)
WBC # FLD AUTO: 4.78 K/UL — SIGNIFICANT CHANGE UP (ref 3.8–10.5)

## 2024-09-08 PROCEDURE — 99233 SBSQ HOSP IP/OBS HIGH 50: CPT

## 2024-09-08 RX ORDER — SUCRALFATE 1 G/10ML
1 SUSPENSION ORAL EVERY 6 HOURS
Refills: 0 | Status: DISCONTINUED | OUTPATIENT
Start: 2024-09-08 | End: 2024-09-09

## 2024-09-08 RX ORDER — PANTOPRAZOLE SODIUM 40 MG
40 TABLET, DELAYED RELEASE (ENTERIC COATED) ORAL
Refills: 0 | Status: DISCONTINUED | OUTPATIENT
Start: 2024-09-08 | End: 2024-09-09

## 2024-09-08 RX ADMIN — SUCRALFATE 1 GRAM(S): 1 SUSPENSION ORAL at 17:16

## 2024-09-08 RX ADMIN — SUCRALFATE 1 GRAM(S): 1 SUSPENSION ORAL at 23:34

## 2024-09-08 RX ADMIN — Medication 2: at 12:11

## 2024-09-08 NOTE — PROGRESS NOTE ADULT - PROBLEM SELECTOR PLAN 3
Diagnosed with Gastric Ca in 7/2024 (Had ulcerated mass).   Last EGD revealed H Pylori and pt was treated with Amox and Biaxin.  Completed Chemo (Last session 8/28)   No previous Colonoscopy   Plan for surgery to be scheduled in october

## 2024-09-08 NOTE — PROGRESS NOTE ADULT - PROBLEM SELECTOR PLAN 2
Dizziness with syncope   In the ED H/H:6.9/22.2   - Tele   - 2PRBC >>8.1   - family is asking to go to f/u with oncologist. I reached out to her primary oncologist on teams but no answer.   - pt denies any melena but is high risk for recurrent bleeding so will tx a 3rd unit with anticipation to dc home.   - plans was for surgery in October but may need to be expedited vs radiation   - Monitor H/H to keep Hgb>8  - DVT prophylaxis (SCD)

## 2024-09-08 NOTE — PROGRESS NOTE ADULT - SUBJECTIVE AND OBJECTIVE BOX
Patient is a 49y old  Male who presents with a chief complaint of Syncope with symptomatic Anemia (07 Sep 2024 18:37)      INTERVAL HPI/OVERNIGHT EVENTS: denies bleeding     MEDICATIONS  (STANDING):  dextrose 5%. 1000 milliLiter(s) (50 mL/Hr) IV Continuous <Continuous>  dextrose 50% Injectable 25 Gram(s) IV Push once  glucagon  Injectable 1 milliGRAM(s) IntraMuscular once  insulin lispro (ADMELOG) corrective regimen sliding scale   SubCutaneous three times a day before meals    MEDICATIONS  (PRN):  acetaminophen     Tablet .. 650 milliGRAM(s) Oral every 6 hours PRN Temp greater or equal to 38C (100.4F), Mild Pain (1 - 3)  aluminum hydroxide/magnesium hydroxide/simethicone Suspension 30 milliLiter(s) Oral every 4 hours PRN Dyspepsia  dextrose Oral Gel 15 Gram(s) Oral once PRN Blood Glucose LESS THAN 70 milliGRAM(s)/deciliter  melatonin 3 milliGRAM(s) Oral at bedtime PRN Insomnia  ondansetron Injectable 4 milliGRAM(s) IV Push every 8 hours PRN Nausea and/or Vomiting      Allergies    No Known Drug Allergies  shellfish (Anaphylaxis)    Intolerances        REVIEW OF SYSTEMS:  CONSTITUTIONAL: No fever, weight loss  EYES: No eye pain, visual disturbances, or discharge  ENMT:  No difficulty hearing, tinnitus, vertigo; No sinus or throat pain  RESPIRATORY: No cough, wheezing, chills or hemoptysis; No shortness of breath  CARDIOVASCULAR: No chest pain, palpitations, dizziness, or leg swelling  GASTROINTESTINAL: No abdominal or epigastric pain. No nausea, vomiting, or hematemesis; No diarrhea or constipation. No melena or hematochezia.  GENITOURINARY: No dysuria, frequency, hematuria, or incontinence  NEUROLOGICAL: No headaches, memory loss, loss of strength, numbness, or tremors  SKIN: No itching, burning, rashes, or lesions   MUSCULOSKELETAL: No joint pain or swelling; No muscle, back, or extremity pain  PSYCHIATRIC: No depression, anxiety, mood swings, or difficulty sleeping  HEME/LYMPH: No easy bruising, or bleeding gums      Vital Signs Last 24 Hrs  T(C): 36.7 (08 Sep 2024 09:11), Max: 37.4 (07 Sep 2024 13:45)  T(F): 98.1 (08 Sep 2024 09:11), Max: 99.3 (07 Sep 2024 13:45)  HR: 81 (08 Sep 2024 09:11) (69 - 96)  BP: 100/63 (08 Sep 2024 09:11) (92/59 - 115/73)  BP(mean): 70 (08 Sep 2024 03:25) (70 - 74)  RR: 17 (08 Sep 2024 09:11) (12 - 19)  SpO2: 100% (08 Sep 2024 09:11) (100% - 100%)    Parameters below as of 08 Sep 2024 09:11  Patient On (Oxygen Delivery Method): room air        PHYSICAL EXAM:  GENERAL: NAD  HEAD:  Atraumatic, Normocephalic  EYES: EOMI, PERRLA, conjunctiva and sclera clear  ENMT: No tonsillar erythema, exudates, or enlargement;   NECK: Supple, Normal thyroid  NERVOUS SYSTEM:  Alert & Oriented X3, Good concentration; Motor Strength 5/5 B/L upper and lower extremities; DTRs 2+ intact and symmetric  CHEST/LUNG: CTABL; No rales, rhonchi, wheezing, or rubs  HEART: Regular rate and rhythm; No murmurs, rubs, or gallops  ABDOMEN: Soft, Nontender, Nondistended; Bowel sounds present  EXTREMITIES:  2+ Peripheral Pulses, No clubbing, cyanosis, or edema  LYMPH: No lymphadenopathy noted  SKIN: No rashes or lesions    LABS:                        8.1    4.78  )-----------( 214      ( 08 Sep 2024 08:35 )             25.0     09-08    142  |  114<H>  |  15  ----------------------------<  126<H>  4.2   |  27  |  0.58    Ca    8.2<L>      08 Sep 2024 08:35  Mg     1.8     09-07    TPro  4.9<L>  /  Alb  2.5<L>  /  TBili  0.4  /  DBili  x   /  AST  12<L>  /  ALT  19  /  AlkPhos  48  09-08    PT/INR - ( 07 Sep 2024 09:40 )   PT: 12.8 sec;   INR: 1.07 ratio         PTT - ( 07 Sep 2024 09:40 )  PTT:27.8 sec  Urinalysis Basic - ( 08 Sep 2024 08:35 )    Color: x / Appearance: x / SG: x / pH: x  Gluc: 126 mg/dL / Ketone: x  / Bili: x / Urobili: x   Blood: x / Protein: x / Nitrite: x   Leuk Esterase: x / RBC: x / WBC x   Sq Epi: x / Non Sq Epi: x / Bacteria: x      CAPILLARY BLOOD GLUCOSE      POCT Blood Glucose.: 199 mg/dL (08 Sep 2024 11:19)  POCT Blood Glucose.: 118 mg/dL (08 Sep 2024 08:43)  POCT Blood Glucose.: 116 mg/dL (08 Sep 2024 05:31)  POCT Blood Glucose.: 137 mg/dL (07 Sep 2024 19:29)      RADIOLOGY & ADDITIONAL TESTS:    Imaging Personally Reviewed:  [ ] YES  [ ] NO    Consultant(s) Notes Reviewed:  [ ] YES  [ ] NO    Care Discussed with Consultants/Other Providers [ ] YES  [ ] NO

## 2024-09-08 NOTE — PROGRESS NOTE ADULT - PROBLEM SELECTOR PLAN 1
Witnessed syncopal episode   Most likely 2/2 low hemoglobin  CT-Head: No acute intracranial hemorrhage, mass effect, or midline shift.

## 2024-09-09 ENCOUNTER — TRANSCRIPTION ENCOUNTER (OUTPATIENT)
Age: 49
End: 2024-09-09

## 2024-09-09 VITALS
RESPIRATION RATE: 18 BRPM | TEMPERATURE: 98 F | DIASTOLIC BLOOD PRESSURE: 80 MMHG | OXYGEN SATURATION: 99 % | SYSTOLIC BLOOD PRESSURE: 120 MMHG | HEART RATE: 82 BPM

## 2024-09-09 LAB
GLUCOSE BLDC GLUCOMTR-MCNC: 102 MG/DL — HIGH (ref 70–99)
GLUCOSE BLDC GLUCOMTR-MCNC: 133 MG/DL — HIGH (ref 70–99)
HCT VFR BLD CALC: 28.1 % — LOW (ref 39–50)
HGB BLD-MCNC: 9 G/DL — LOW (ref 13–17)
MCHC RBC-ENTMCNC: 28.5 PG — SIGNIFICANT CHANGE UP (ref 27–34)
MCHC RBC-ENTMCNC: 32 G/DL — SIGNIFICANT CHANGE UP (ref 32–36)
MCV RBC AUTO: 88.9 FL — SIGNIFICANT CHANGE UP (ref 80–100)
NRBC # BLD: 0 /100 WBCS — SIGNIFICANT CHANGE UP (ref 0–0)
PLATELET # BLD AUTO: 222 K/UL — SIGNIFICANT CHANGE UP (ref 150–400)
RBC # BLD: 3.16 M/UL — LOW (ref 4.2–5.8)
RBC # FLD: 18.8 % — HIGH (ref 10.3–14.5)
WBC # BLD: 4.69 K/UL — SIGNIFICANT CHANGE UP (ref 3.8–10.5)
WBC # FLD AUTO: 4.69 K/UL — SIGNIFICANT CHANGE UP (ref 3.8–10.5)

## 2024-09-09 PROCEDURE — 99239 HOSP IP/OBS DSCHRG MGMT >30: CPT

## 2024-09-09 RX ADMIN — SUCRALFATE 1 GRAM(S): 1 SUSPENSION ORAL at 12:38

## 2024-09-09 RX ADMIN — Medication 40 MILLIGRAM(S): at 05:02

## 2024-09-09 RX ADMIN — SUCRALFATE 1 GRAM(S): 1 SUSPENSION ORAL at 05:02

## 2024-09-09 NOTE — DISCHARGE NOTE PROVIDER - NSDCMRMEDTOKEN_GEN_ALL_CORE_FT
pantoprazole 40 mg oral delayed release tablet: 1 tab(s) orally once a day (before a meal) MDD: 2  sucralfate 1 g/10 mL oral suspension: 10 milliliter(s) orally 4 times a day

## 2024-09-09 NOTE — DISCHARGE NOTE NURSING/CASE MANAGEMENT/SOCIAL WORK - NSDCFUADDAPPT_GEN_ALL_CORE_FT
It is important to see your primary physician as well as the physicians noted below within the next week to perform a comprehensive medical review.  Call their offices for an appointment as soon as you leave the hospital.  You will also need to see them for renewal of your medications.  If you do not have a primary physician, contact the [facility] Physician Referral Service at [phone].  To obtain your results, you can access the [employer] Patient Portal at [email or web address].  Your medical issues appear to be stable at this time, but if your symptoms recur or worsen, contact your physicians and/or return to the hospital if necessary.  If you encounter any issues or questions with your medication, call your physicians before stopping the medication.

## 2024-09-09 NOTE — DISCHARGE NOTE PROVIDER - NSDCFUSCHEDAPPT_GEN_ALL_CORE_FT
Nikko Carrasco  [facility]  SURGONC [address]  Scheduled Appointment: 09/10/2024    Marisol Pereira  [facility]  HEMONC [address]  Scheduled Appointment: 09/12/2024    [facility]  AMBCHEMO  [address]  Scheduled Appointment: 09/12/2024    [facility]  AMBCHEMO  [address]  Scheduled Appointment: 09/13/2024    [facility]  AMBCHEMO  [address]  Scheduled Appointment: 09/26/2024    [facility]  AMBCHEMO  [address]  Scheduled Appointment: 09/27/2024

## 2024-09-09 NOTE — DISCHARGE NOTE PROVIDER - NSDCCPCAREPLAN_GEN_ALL_CORE_FT
PRINCIPAL DISCHARGE DIAGNOSIS  Diagnosis: Syncope  Assessment and Plan of Treatment: Syncope related to low hemoglobin (anemia), which was treated with blood tranfusion. Your Hgb was 9.0 prior to being discharged, its important to follow up with your doctors to check this number.  What is anemia?  This is when a person has too few red blood cells. Red blood cells carry oxygen in the blood. They contain a protein called "hemoglobin" that picks up oxygen molecules. The body uses iron to make hemoglobin.  If you have too few red blood cells and not enough hemoglobin, your body might not be able to get all of the oxygen it needs.      SECONDARY DISCHARGE DIAGNOSES  Diagnosis: Low hemoglobin  Assessment and Plan of Treatment:

## 2024-09-09 NOTE — DISCHARGE NOTE NURSING/CASE MANAGEMENT/SOCIAL WORK - PATIENT PORTAL LINK FT
You can access the Follow[employer] Patient Portal offered by [facility] by registering at the following website: [email or web address]. By joining [employer]’s FollowMy[employer] portal, you will also be able to view your health information using other applications (apps) compatible with our system.

## 2024-09-09 NOTE — DISCHARGE NOTE PROVIDER - HOSPITAL COURSE
49 year old male with a PMH of HTN, pre-DM, Gastric ca s/p chemo (last session 8/28), recent hospitalizations for syncopal events, presents to the ED for syncopal episode at home with witnessed LOC. Found to be hypotensive, improved with IVF, with ED vitals: /90, Temp 98.1, with labs showing H/H 6.9. Patient admitted for syncope w/u, with CTH negative for acute pathology. Baseline hemoglobin appears to fluctuate between 8-10, but patient with no signs of bleeding at this time. He was transfused 3 units pRBC initially with improved response in H/H and no further syncopal events or symptoms. Prior to DC, his Hgb is 9.0, hemodynamically stable, saturating well on RA. Pt has close follow up with his outpatient oncologist regarding treatment indications of surgery vs radiation.      Problem/Plan - 1:  ·  Problem: Syncope.   ·  Plan: Witnessed syncopal episode   Most likely 2/2 low hemoglobin  CT-Head: No acute intracranial hemorrhage, mass effect, or midline shift.     Problem/Plan - 2:  ·  Problem: Symptomatic anemia.   ·  Plan: Dizziness with syncope   In the ED H/H:6.9/22.2   - Tele   - 2PRBC >>8.1   - family is asking to go to f/u with oncologist. I reached out to her primary oncologist on teams but no answer.   - pt denies any melena but is high risk for recurrent bleeding so will tx a 3rd unit with anticipation to dc home.   - plans was for surgery in October but may need to be expedited vs radiation   - Monitor H/H to keep Hgb>8  - DVT prophylaxis (SCD).     Problem/Plan - 3:  ·  Problem: Gastric cancer.   ·  Plan: Diagnosed with Gastric Ca in 7/2024 (Had ulcerated mass).   Last EGD revealed H Pylori and pt was treated with Amox and Biaxin.  Completed Chemo (Last session 8/28)   No previous Colonoscopy   Plan for surgery to be scheduled in october.

## 2024-09-09 NOTE — DISCHARGE NOTE PROVIDER - PROVIDER TOKENS
FREE:[LAST:[PCP],PHONE:[(   )    -],FAX:[(   )    -],FOLLOWUP:[1 week]],FREE:[LAST:[Oncologist],PHONE:[(   )    -],FAX:[(   )    -],FOLLOWUP:[1 week]]

## 2024-09-09 NOTE — DISCHARGE NOTE PROVIDER - CARE PROVIDER_API CALL
PCP,   Phone: (   )    -  Fax: (   )    -  Follow Up Time: 1 week    Oncologist,   Phone: (   )    -  Fax: (   )    -  Follow Up Time: 1 week

## 2024-09-09 NOTE — DISCHARGE NOTE PROVIDER - NSDCFUADDAPPT_GEN_ALL_CORE_FT
It is important to see your primary physician as well as the physicians noted below within the next week to perform a comprehensive medical review.  Call their offices for an appointment as soon as you leave the hospital.  You will also need to see them for renewal of your medications.  If you do not have a primary physician, contact the [facility] Physician Referral Service at [phone].  To obtain your results, you can access the [employer] Patient Portal at [email or web address].  Your medical issues appear to be stable at this time, but if your symptoms recur or worsen, contact your physicians and/or return to the hospital if necessary.  If you encounter any issues or questions with your medication, call your physicians before stopping the medication.

## 2024-09-10 ENCOUNTER — APPOINTMENT (OUTPATIENT)
Dept: HEMATOLOGY ONCOLOGY | Facility: CLINIC | Age: 49
End: 2024-09-10
Payer: COMMERCIAL

## 2024-09-10 ENCOUNTER — APPOINTMENT (OUTPATIENT)
Dept: SURGICAL ONCOLOGY | Facility: CLINIC | Age: 49
End: 2024-09-10
Payer: COMMERCIAL

## 2024-09-10 ENCOUNTER — INPATIENT (INPATIENT)
Facility: HOSPITAL | Age: 49
LOS: 13 days | Discharge: HOME | End: 2024-09-24
Attending: SURGERY | Admitting: SURGERY
Payer: COMMERCIAL

## 2024-09-10 VITALS
HEART RATE: 71 BPM | TEMPERATURE: 98 F | OXYGEN SATURATION: 99 % | RESPIRATION RATE: 18 BRPM | SYSTOLIC BLOOD PRESSURE: 122 MMHG | DIASTOLIC BLOOD PRESSURE: 83 MMHG

## 2024-09-10 VITALS
HEIGHT: 67 IN | RESPIRATION RATE: 16 BRPM | HEART RATE: 80 BPM | DIASTOLIC BLOOD PRESSURE: 87 MMHG | OXYGEN SATURATION: 100 % | SYSTOLIC BLOOD PRESSURE: 127 MMHG | BODY MASS INDEX: 20.09 KG/M2 | WEIGHT: 128 LBS

## 2024-09-10 DIAGNOSIS — R55 SYNCOPE AND COLLAPSE: ICD-10-CM

## 2024-09-10 DIAGNOSIS — Z98.890 OTHER SPECIFIED POSTPROCEDURAL STATES: Chronic | ICD-10-CM

## 2024-09-10 DIAGNOSIS — C16.9 MALIGNANT NEOPLASM OF STOMACH, UNSPECIFIED: ICD-10-CM

## 2024-09-10 LAB
ALBUMIN SERPL ELPH-MCNC: 3.5 G/DL — SIGNIFICANT CHANGE UP (ref 3.3–5)
ALP SERPL-CCNC: 57 U/L — SIGNIFICANT CHANGE UP (ref 40–120)
ALT FLD-CCNC: 14 U/L — SIGNIFICANT CHANGE UP (ref 10–45)
ANION GAP SERPL CALC-SCNC: 7 MMOL/L — SIGNIFICANT CHANGE UP (ref 5–17)
ANISOCYTOSIS BLD QL: SLIGHT — SIGNIFICANT CHANGE UP
APTT BLD: 32.8 SEC — SIGNIFICANT CHANGE UP (ref 24.5–35.6)
AST SERPL-CCNC: 13 U/L — SIGNIFICANT CHANGE UP (ref 10–40)
BASOPHILS # BLD AUTO: 0 K/UL — SIGNIFICANT CHANGE UP (ref 0–0.2)
BASOPHILS NFR BLD AUTO: 0 % — SIGNIFICANT CHANGE UP (ref 0–2)
BILIRUB SERPL-MCNC: 0.3 MG/DL — SIGNIFICANT CHANGE UP (ref 0.2–1.2)
BLD GP AB SCN SERPL QL: NEGATIVE — SIGNIFICANT CHANGE UP
BUN SERPL-MCNC: 9 MG/DL — SIGNIFICANT CHANGE UP (ref 7–23)
BURR CELLS BLD QL SMEAR: PRESENT — SIGNIFICANT CHANGE UP
CALCIUM SERPL-MCNC: 8.7 MG/DL — SIGNIFICANT CHANGE UP (ref 8.4–10.5)
CHLORIDE SERPL-SCNC: 107 MMOL/L — SIGNIFICANT CHANGE UP (ref 96–108)
CO2 SERPL-SCNC: 26 MMOL/L — SIGNIFICANT CHANGE UP (ref 22–31)
CREAT SERPL-MCNC: 0.61 MG/DL — SIGNIFICANT CHANGE UP (ref 0.5–1.3)
DACRYOCYTES BLD QL SMEAR: SLIGHT — SIGNIFICANT CHANGE UP
EGFR: 116 ML/MIN/1.73M2 — SIGNIFICANT CHANGE UP
EOSINOPHIL # BLD AUTO: 0.04 K/UL — SIGNIFICANT CHANGE UP (ref 0–0.5)
EOSINOPHIL NFR BLD AUTO: 0.9 % — SIGNIFICANT CHANGE UP (ref 0–6)
FERRITIN SERPL-MCNC: 57 NG/ML — SIGNIFICANT CHANGE UP (ref 30–400)
GIANT PLATELETS BLD QL SMEAR: PRESENT — SIGNIFICANT CHANGE UP
GLUCOSE SERPL-MCNC: 100 MG/DL — HIGH (ref 70–99)
HCT VFR BLD CALC: 31.6 % — LOW (ref 39–50)
HGB BLD-MCNC: 9.7 G/DL — LOW (ref 13–17)
INR BLD: 0.94 — SIGNIFICANT CHANGE UP (ref 0.85–1.18)
IRON SATN MFR SERPL: 11 % — LOW (ref 16–55)
IRON SATN MFR SERPL: 25 UG/DL — LOW (ref 45–165)
LYMPHOCYTES # BLD AUTO: 0.87 K/UL — LOW (ref 1–3.3)
LYMPHOCYTES # BLD AUTO: 19.8 % — SIGNIFICANT CHANGE UP (ref 13–44)
MACROCYTES BLD QL: SLIGHT — SIGNIFICANT CHANGE UP
MAGNESIUM SERPL-MCNC: 1.9 MG/DL — SIGNIFICANT CHANGE UP (ref 1.6–2.6)
MANUAL SMEAR VERIFICATION: SIGNIFICANT CHANGE UP
MCHC RBC-ENTMCNC: 27.6 PG — SIGNIFICANT CHANGE UP (ref 27–34)
MCHC RBC-ENTMCNC: 30.7 GM/DL — LOW (ref 32–36)
MCV RBC AUTO: 90 FL — SIGNIFICANT CHANGE UP (ref 80–100)
MONOCYTES # BLD AUTO: 0.47 K/UL — SIGNIFICANT CHANGE UP (ref 0–0.9)
MONOCYTES NFR BLD AUTO: 10.8 % — SIGNIFICANT CHANGE UP (ref 2–14)
NEUTROPHILS # BLD AUTO: 2.99 K/UL — SIGNIFICANT CHANGE UP (ref 1.8–7.4)
NEUTROPHILS NFR BLD AUTO: 68.5 % — SIGNIFICANT CHANGE UP (ref 43–77)
OVALOCYTES BLD QL SMEAR: SLIGHT — SIGNIFICANT CHANGE UP
PHOSPHATE SERPL-MCNC: 2.6 MG/DL — SIGNIFICANT CHANGE UP (ref 2.5–4.5)
PLAT MORPH BLD: ABNORMAL
PLATELET # BLD AUTO: 248 K/UL — SIGNIFICANT CHANGE UP (ref 150–400)
POIKILOCYTOSIS BLD QL AUTO: SIGNIFICANT CHANGE UP
POLYCHROMASIA BLD QL SMEAR: SLIGHT — SIGNIFICANT CHANGE UP
POTASSIUM SERPL-MCNC: 4 MMOL/L — SIGNIFICANT CHANGE UP (ref 3.5–5.3)
POTASSIUM SERPL-SCNC: 4 MMOL/L — SIGNIFICANT CHANGE UP (ref 3.5–5.3)
PREALB SERPL-MCNC: 21 MG/DL — SIGNIFICANT CHANGE UP (ref 20–40)
PROT SERPL-MCNC: 5.7 G/DL — LOW (ref 6–8.3)
PROTHROM AB SERPL-ACNC: 10.7 SEC — SIGNIFICANT CHANGE UP (ref 9.5–13)
RBC # BLD: 3.51 M/UL — LOW (ref 4.2–5.8)
RBC # FLD: 18.6 % — HIGH (ref 10.3–14.5)
RBC BLD AUTO: ABNORMAL
RH IG SCN BLD-IMP: POSITIVE — SIGNIFICANT CHANGE UP
SCHISTOCYTES BLD QL AUTO: SLIGHT — SIGNIFICANT CHANGE UP
SMUDGE CELLS # BLD: PRESENT — SIGNIFICANT CHANGE UP
SODIUM SERPL-SCNC: 140 MMOL/L — SIGNIFICANT CHANGE UP (ref 135–145)
SPHEROCYTES BLD QL SMEAR: SLIGHT — SIGNIFICANT CHANGE UP
TIBC SERPL-MCNC: 236 UG/DL — SIGNIFICANT CHANGE UP (ref 220–430)
TRIGL SERPL-MCNC: 75 MG/DL — SIGNIFICANT CHANGE UP
UIBC SERPL-MCNC: 211 UG/DL — SIGNIFICANT CHANGE UP (ref 110–370)
WBC # BLD: 4.37 K/UL — SIGNIFICANT CHANGE UP (ref 3.8–10.5)
WBC # FLD AUTO: 4.37 K/UL — SIGNIFICANT CHANGE UP (ref 3.8–10.5)

## 2024-09-10 PROCEDURE — 99223 1ST HOSP IP/OBS HIGH 75: CPT

## 2024-09-10 PROCEDURE — 99214 OFFICE O/P EST MOD 30 MIN: CPT

## 2024-09-10 PROCEDURE — 99222 1ST HOSP IP/OBS MODERATE 55: CPT

## 2024-09-10 PROCEDURE — 71045 X-RAY EXAM CHEST 1 VIEW: CPT | Mod: 26

## 2024-09-10 PROCEDURE — 36569 INSJ PICC 5 YR+ W/O IMAGING: CPT

## 2024-09-10 RX ORDER — SOD PHOS DI, MONO/K PHOS MONO 250 MG
1 TABLET ORAL ONCE
Refills: 0 | Status: COMPLETED | OUTPATIENT
Start: 2024-09-10 | End: 2024-09-10

## 2024-09-10 RX ORDER — SODIUM CHLORIDE 0.9 % (FLUSH) 0.9 %
10 SYRINGE (ML) INJECTION
Refills: 0 | Status: DISCONTINUED | OUTPATIENT
Start: 2024-09-10 | End: 2024-09-19

## 2024-09-10 RX ORDER — PANTOPRAZOLE SODIUM 40 MG/1
40 TABLET, DELAYED RELEASE ORAL EVERY 12 HOURS
Refills: 0 | Status: DISCONTINUED | OUTPATIENT
Start: 2024-09-10 | End: 2024-09-19

## 2024-09-10 RX ORDER — INFLUENZA VIRUS VACCINE 15; 15; 15; 15 UG/.5ML; UG/.5ML; UG/.5ML; UG/.5ML
0.5 SUSPENSION INTRAMUSCULAR ONCE
Refills: 0 | Status: DISCONTINUED | OUTPATIENT
Start: 2024-09-10 | End: 2024-09-24

## 2024-09-10 RX ORDER — CHLORHEXIDINE GLUCONATE ORAL RINSE 1.2 MG/ML
1 SOLUTION DENTAL
Refills: 0 | Status: DISCONTINUED | OUTPATIENT
Start: 2024-09-10 | End: 2024-09-19

## 2024-09-10 RX ORDER — SODIUM CHLORIDE IRRIG SOLUTION 0.9 %
1000 SOLUTION, IRRIGATION IRRIGATION
Refills: 0 | Status: DISCONTINUED | OUTPATIENT
Start: 2024-09-10 | End: 2024-09-11

## 2024-09-10 RX ORDER — IRON SUCROSE 20 MG/ML
500 INJECTION, SOLUTION INTRAVENOUS EVERY 24 HOURS
Refills: 0 | Status: DISCONTINUED | OUTPATIENT
Start: 2024-09-10 | End: 2024-09-10

## 2024-09-10 RX ORDER — I.V. FAT EMULSION 20 G/100ML
1 EMULSION INTRAVENOUS
Qty: 50 | Refills: 0 | Status: DISCONTINUED | OUTPATIENT
Start: 2024-09-10 | End: 2024-09-10

## 2024-09-10 RX ADMIN — Medication 1 GRAM(S): at 18:07

## 2024-09-10 RX ADMIN — Medication 1 PACKET(S): at 18:07

## 2024-09-10 RX ADMIN — PANTOPRAZOLE SODIUM 40 MILLIGRAM(S): 40 TABLET, DELAYED RELEASE ORAL at 18:07

## 2024-09-10 NOTE — CONSULT NOTE ADULT - ASSESSMENT
50 y/o M with HTN (not on meds) diagnosed with Gastric cancer 7/2024on chemotherapy who was admitted to [facility] for evaluation of his gastric cancer. Medicine consulted for optimization prior to surgery.    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   - His anemia is likely acute blood loss secondary to his gastric cancer  - trend CBC daily  - keep active T&S  - Plan for EGD with GI tomorrow 9/11  48 y/o M with HTN (not on meds) diagnosed with Gastric cancer 7/2024on chemotherapy who was admitted to [facility] for evaluation of his gastric cancer. Medicine consulted for optimization prior to surgery.    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   - His anemia is likely acute blood loss secondary to his gastric cancer  - trend CBC daily  - keep active T&S  - Plan for EGD with GI tomorrow 9/11    #Nutrition  - as per surgery will plan for TPN  - please trend electrolytes daily

## 2024-09-10 NOTE — H&P ADULT - HISTORY OF PRESENT ILLNESS
48 y/o M with HTN (not on meds) diagnosed with Gastric cancer 7/2024 s/p dx lap w/ peritoneal washings on 7/31, followed by Gary, on neoadjuvant chemo therapy (last session 8/28), approx 30 lbs unintentional weight loss since diagnosis. Most recently on 9/7/24, admitted to OSH for syncope, hgb 6.9 s/p  2 units PRBC transfusion. Pt was sent here after seeing Dr. Carrasco today in office with complaints of black bowel movement yesterday. Denies fever, chills, n/v/d, cp, sob, hematemesis. Denies previous colonoscopy. 50 y/o M with HTN (not on meds) diagnosed with Gastric cancer 7/2024 s/p dx lap w/ peritoneal washings on 7/31, followed by Kelli, on neoadjuvant chemo therapy (last session 8/28), approx 30 lbs unintentional weight loss since diagnosis. Most recently on 9/7/24, admitted to OSH for syncope, hgb 6.9 s/p  2 units PRBC transfusion. Pt was sent here after seeing Dr. Carrasco today in office with complaints of black bowel movement yesterday. Denies fever, chills, n/v/d, cp, sob, hematemesis. Denies previous colonoscopy.

## 2024-09-10 NOTE — CONSULT NOTE ADULT - ATTENDING COMMENTS
50 y/o M with HTN (was on low dose Losartan previously but was taken off for 3 months) diagnosed with Gastric cancer 7/2024 s/p dx lap w/ peritoneal washings on 7/31, followed by Dr. Pereira, on neoadjuvant chemo therapy (last session 8/28). Patient reports he first was diagnosed with gastric cancer in July 2024 when he a a syncopal event, found to be anemic with an EGD that revealed the mass. He has since had two syncopal events (7/6 & 8/14) secondary to severe anemia; both of which were about 2 weeks after chemotherapy. He endorses approx 30 lbs unintentional weight loss since diagnosis. Most recently on 9/7/24, admitted to OSH for syncope, hgb 6.9 s/p  2 units PRBC transfusion. Pt was sent here after seeing Surgical Oncologist Dr. Carrasco today in office with complaints of black bowel movement yesterday. Denies fever, chills, n/v/d, cp, sob, hematemesis. Denies previous colonoscopy. (10 Sep 2024 13:29)    Chart/labs reviewed. Pt interviewed with Dr. Martinez.   Gen: Thin man AAOx3 in good spirits Heart normal heart sounds lungs clear abd ND/NT ext: no edema    #Pre-op for distal gastrectomy   - Patient has excellent METS - works as a   - RCRI = 3.9 % 30-day risk of death, MI, or cardiac arrest  - JARAMILLO = 0.0 % Risk of myocardial infarction or cardiac arrest, intraoperatively or up to 30 days post-op  - Patient is low risk for intermediate risk procedure    #BLood loss Anemia/OSIRIS   - Patient with evidence of OSIRIS in July 2024 but labs on this admission are consistent with low-normal iron stores. Tsat 11%  - Will start IV iron sucrose 500mg IV daily x 2 doses total 1gm then PO iron sulfate 325mg PO daily  - His anemia is likely acute blood loss secondary to his gastric cancer  - trend CBC daily Hgb 9.7 on adm ( 9/10)  - keep active T&S x2 initially then q 72hr   - Plan for EGD with GI tomorrow (9/11) and likely colonoscopy (9/12)    #Nutrition/severe protein calorie malnutrition  # weight loss   - PICC line LUE placed by IR (9/10)   - as per surgery will plan for TPN  - please trend electrolytes daily    Thank you for having us participating with his care. Medicine will follow pt with you.

## 2024-09-10 NOTE — CONSULT NOTE ADULT - SUBJECTIVE AND OBJECTIVE BOX
INTERNAL MEDICINE - INITIAL CONSULT NOTE    FRANC CORTEZ  [id number]    Patient is a 49y old  Male who presents with a chief complaint of GI bleed (10 Sep 2024 15:39)      HPI:  Mr. Cortez is a 50 y/o M with HTN (not on meds) diagnosed with Gastric cancer 7/2024 s/p dx lap w/ peritoneal washings on 7/31, followed by Kelli, on neoadjuvant chemo therapy (last session 8/28). Patient reports he first was diagnosed with gastric cancer in July 2024 when he a a syncopal event, found to be anemic with an EGD that revealed the mass. He has since had two syncopal events secondary to anemia; both of which were about 2 weeks after chemotherapy. He endorses approx 30 lbs unintentional weight loss since diagnosis. Most recently on 9/7/24, admitted to OSH for syncope, hgb 6.9 s/p  2 units PRBC transfusion. Pt was sent here after seeing Dr. Carrasco today in office with complaints of black bowel movement yesterday. Denies fever, chills, n/v/d, cp, sob, hematemesis. Denies previous colonoscopy. (10 Sep 2024 13:29)      PAST MEDICAL & SURGICAL HISTORY:  HTN (hypertension)      Malignant neoplasm of stomach      History of prediabetes      History of esophagogastroduodenoscopy (EGD)      Status post laparoscopic procedure          chlorhexidine 2% Cloths 1 Application(s) Topical <User Schedule>  dextrose 5% + sodium chloride 0.45%. 1000 milliLiter(s) IV Continuous <Continuous>  influenza   Vaccine 0.5 milliLiter(s) IntraMuscular once  lipid, fat emulsion (Plant Based) 20% Infusion 1 Gm/kG/Day IV Continuous <Continuous>  pantoprazole  Injectable 40 milliGRAM(s) IV Push every 12 hours  potassium phosphate / sodium phosphate Powder (PHOS-NaK) 1 Packet(s) Oral once  sodium chloride 0.9% lock flush 10 milliLiter(s) IV Push every 1 hour PRN  sucralfate suspension 1 Gram(s) Oral every 6 hours      PHYSICAL EXAM:  T(C): 36.4 (09-10-24 @ 13:28), Max: 36.4 (09-10-24 @ 13:28)  HR: 71 (09-10-24 @ 13:28) (71 - 71)  BP: 122/83 (09-10-24 @ 13:28) (122/83 - 122/83)  RR: 18 (09-10-24 @ 13:28) (18 - 18)  SpO2: 99% (09-10-24 @ 13:28) (99% - 99%)    General: NAD, AAOx3  HEENT: atraumatic, normocephalic  Pulmonary: clear to auscultation bilaterally; No wheeze  Cardiovascular: Regular rate and rhythm; no murmurs, rubs or gallops. Normal S1S2  Gastrointestinal: Soft, nontender, nondistended; bowel sounds present  Musculoskeletal: 2+ peripheral pulses, no clubbing, cyanosis or edema  Neurology: Pt. alert and oriented, fluent speech, able to move all extremities  Skin: no rashes or lesions      Consultant(s) Notes Reviewed:  [x ] YES  [ ] NO  Care Discussed with Consultants/Other Providers [ x] YES  [ ] NO    LABS:  LABS:                         9.7    4.37  )-----------( 248      ( 10 Sep 2024 15:17 )             31.6     09-10    140  |  107  |  9   ----------------------------<  100<H>  4.0   |  26  |  0.61    Ca    8.7      10 Sep 2024 15:17  Phos  2.6     09-10  Mg     1.9     09-10    TPro  5.7<L>  /  Alb  3.5  /  TBili  0.3  /  DBili  x   /  AST  13  /  ALT  14  /  AlkPhos  57  09-10    PT/INR - ( 10 Sep 2024 15:17 )   PT: 10.7 sec;   INR: 0.94          PTT - ( 10 Sep 2024 15:17 )  PTT:32.8 sec  Urinalysis Basic - ( 10 Sep 2024 15:17 )    Color: x / Appearance: x / SG: x / pH: x  Gluc: 100 mg/dL / Ketone: x  / Bili: x / Urobili: x   Blood: x / Protein: x / Nitrite: x   Leuk Esterase: x / RBC: x / WBC x   Sq Epi: x / Non Sq Epi: x / Bacteria: x                RADIOLOGY, EKG & ADDITIONAL TESTS:         RADIOLOGY & ADDITIONAL TESTS:    Imaging Personally Reviewed:  [X] YES  [ ] NO

## 2024-09-10 NOTE — CONSULT NOTE ADULT - SUBJECTIVE AND OBJECTIVE BOX
GASTROENTEROLOGY CONSULT NOTE  HPI:  50 y/o M  Gastric Ca  episodes of syncope and anemia  July was diagnosis of Gastric Ca  no endoscopy since  no colonoscopy    Allergies    No Known Drug Allergies  shellfish (Anaphylaxis)    Intolerances      Home Medications:    MEDICATIONS:  MEDICATIONS  (STANDING):  chlorhexidine 2% Cloths 1 Application(s) Topical <User Schedule>  dextrose 5% + sodium chloride 0.45%. 1000 milliLiter(s) (100 mL/Hr) IV Continuous <Continuous>  influenza   Vaccine 0.5 milliLiter(s) IntraMuscular once  pantoprazole  Injectable 40 milliGRAM(s) IV Push every 12 hours  sucralfate suspension 1 Gram(s) Oral every 6 hours    MEDICATIONS  (PRN):  sodium chloride 0.9% lock flush 10 milliLiter(s) IV Push every 1 hour PRN Pre/post blood products, medications, blood draw, and to maintain line patency    PAST MEDICAL & SURGICAL HISTORY:  HTN (hypertension)      Malignant neoplasm of stomach      History of prediabetes      History of esophagogastroduodenoscopy (EGD)      Status post laparoscopic procedure        FAMILY HISTORY: denies colon or esophageal or stomach ca    SOCIAL HISTORY:  denies toxic habits,     REVIEW OF SYSTEMS:  All other 10 review of systems is negative unless indicated above.    Vital Signs Last 24 Hrs  T(C): 36.4 (10 Sep 2024 13:28), Max: 36.4 (10 Sep 2024 13:28)  T(F): 97.6 (10 Sep 2024 13:28), Max: 97.6 (10 Sep 2024 13:28)  HR: 71 (10 Sep 2024 13:28) (71 - 71)  BP: 122/83 (10 Sep 2024 13:28) (122/83 - 122/83)  BP(mean): --  RR: 18 (10 Sep 2024 13:28) (18 - 18)  SpO2: 99% (10 Sep 2024 13:28) (99% - 99%)    Parameters below as of 10 Sep 2024 13:28  Patient On (Oxygen Delivery Method): room air          PHYSICAL EXAM:    General: lying in bed, in no acute distress  HEENT: Neck supple, mmm, no jvd  Lungs: Normal respiratory effort, no intercostal retractions  Cardiovascular: regular rate  Abdomen: Soft, non-tender non-distended; No rebound or guarding  Chest: Port site without erythema  Extremities: wwp, no cce, LUE PICC CDI  Neurological: EASTMAN, speech fluent  Skin: Warm and dry. No obvious rash      LABS:                        9.7    4.37  )-----------( 248      ( 10 Sep 2024 15:17 )             31.6                   RADIOLOGY & ADDITIONAL STUDIES:     Reviewed   GASTROENTEROLOGY CONSULT NOTE  HPI:  48 y/o M recently diagnosed Gastric Ca 7/2024, two hospitalizations with episodes of syncope and anemia  July was diagnosis of Gastric Ca  no endoscopy since  no colonoscopy in the past  does not take antiplatelets or anticoagulant    Reports generally feeling unwell with abd pain leading up to the diagnosis. No family history of GI cancers. Lost weight. Appetite has been ok. Tolerating the chemo relatively well by his estimation.     Allergies    No Known Drug Allergies  shellfish (Anaphylaxis)    Intolerances      Home Medications:    MEDICATIONS:  MEDICATIONS  (STANDING):  chlorhexidine 2% Cloths 1 Application(s) Topical <User Schedule>  dextrose 5% + sodium chloride 0.45%. 1000 milliLiter(s) (100 mL/Hr) IV Continuous <Continuous>  influenza   Vaccine 0.5 milliLiter(s) IntraMuscular once  pantoprazole  Injectable 40 milliGRAM(s) IV Push every 12 hours  sucralfate suspension 1 Gram(s) Oral every 6 hours    MEDICATIONS  (PRN):  sodium chloride 0.9% lock flush 10 milliLiter(s) IV Push every 1 hour PRN Pre/post blood products, medications, blood draw, and to maintain line patency    PAST MEDICAL & SURGICAL HISTORY:  HTN (hypertension)      Malignant neoplasm of stomach      History of prediabetes      History of esophagogastroduodenoscopy (EGD)      Status post laparoscopic procedure        FAMILY HISTORY: denies colon or esophageal or stomach ca    SOCIAL HISTORY:  denies toxic habits,     REVIEW OF SYSTEMS:  All other 10 review of systems is negative unless indicated above.    Vital Signs Last 24 Hrs  T(C): 36.4 (10 Sep 2024 13:28), Max: 36.4 (10 Sep 2024 13:28)  T(F): 97.6 (10 Sep 2024 13:28), Max: 97.6 (10 Sep 2024 13:28)  HR: 71 (10 Sep 2024 13:28) (71 - 71)  BP: 122/83 (10 Sep 2024 13:28) (122/83 - 122/83)  BP(mean): --  RR: 18 (10 Sep 2024 13:28) (18 - 18)  SpO2: 99% (10 Sep 2024 13:28) (99% - 99%)    Parameters below as of 10 Sep 2024 13:28  Patient On (Oxygen Delivery Method): room air          PHYSICAL EXAM:    General: lying in bed, in no acute distress  HEENT: Neck supple, mmm, no jvd  Lungs: Normal respiratory effort, no intercostal retractions  Cardiovascular: regular rate  Abdomen: Soft, non-tender non-distended; No rebound or guarding  Chest: Port site without erythema  Extremities: wwp, no cce, LUE PICC CDI  Neurological: EASTMAN, speech fluent  Skin: Warm and dry. No obvious rash      LABS:                        9.7    4.37  )-----------( 248      ( 10 Sep 2024 15:17 )             31.6                   RADIOLOGY & ADDITIONAL STUDIES:     Reviewed

## 2024-09-10 NOTE — H&P ADULT - ASSESSMENT
50 y/o M with HTN (not on meds) diagnosed with Gastric cancer 7/2024 s/p dx lap w/ peritoneal washings on 7/31, followed by Gary, on neoadjuvant chemo therapy (last session 8/28). Pt was sent here after seeing Dr. Carrasco today in office with complaints of black bowel movement yesterday.     place PICC  start TPN/lipids  consult GI for EGD and colonoscopy   STAT labs  medicine c/s for optimization  Diet, depending GI procedure today vs tmw  plans discussed with Chief resident Leigh Ann and Dr. Carrasco 48 y/o M with HTN (not on meds) diagnosed with Gastric cancer 7/2024 s/p dx lap w/ peritoneal washings on 7/31, followed by Dr Pereira, on neoadjuvant chemo therapy (last session 8/28). Pt was sent here after seeing Dr. Carrasco today in office with complaints of black bowel movement yesterday.     place PICC  start TPN/lipids  consult GI for EGD and colonoscopy   STAT labs  medicine c/s for optimization  Diet, depending GI procedure today vs tmw  plans discussed with Chief resident Leigh Ann and Dr. Carrasco

## 2024-09-10 NOTE — HISTORY OF PRESENT ILLNESS
[de-identified] : Patient Name: FRANC GARZA  MRN: [id number]  Referring Provider: LEELEE ANDREW MD  Oncologist: Dr. Pereira Date: 9/10/24  Diagnosis: gastric cancer Operative Date: 7/31/24 Procedure: Laparoscopy Pathology: No peritoneal carcinomatosis  49 year old male presents for follow up of gastric cancer. 7/4/24 - He suffered a syncopal episode with brief LOC. He later went to see his PCP. 7/10/24 - Saw his PCP, blood work showed a Hgb of 4.9 and was referred to ER. 7/11/24 - He presented to [facility] ER and also endorsed symptoms of weakness, sob, and lethargy, and epigastric pain, weight loss of 20lbs over 3 months. He was treated with 3 units of PRBC and Hgb increased to 9.3. Started on IV protonix for UGI. CT AP on 7/11/24 showed some simple liver cysts, cholelithiasis, mural thickening at the gastric antrum and a 0.9 cm sclerotic focus in the left iliac bone may represent a bone island EGD on 7/12/24 showed a large ulcerated mass on the lesser curvature of the stomach and biopsy of the gastric mass showed adenocarcinoma, mod differentiated, H Pylori +. Both reports are scanned into [employer]. He has not yet been treated for H Pylori. 7/13/24 - Hgb 9.3, Dc'd home. 7/25/24 - CT Chest showed no evidence of metastatic disease. 7/31/24 - s/p diagnostic laparoscopy, no peritoneal carcinomatosis. 8/14/24 - started neoadjuvant FLOT, C1 complicated by hospitalization at [facility] 8/21-8/26 for anemia likely due to upper GI bleed s/p PRBC x 3. 8/28/24 He received C2  9/6/24 he had a syncopal episode and was admitted to [facility] again, Hgb 6.9 s/p PRBC.   He is here now to discuss surgical options as he has had two episodes of low Hgb requiring hospital admission and PRBC after each chemotherapy cycle.  He feels well today, no dizziness or light headedness, he denies nausea or vomiting. He is tolerating a regular diet but is aware he's lost weight and needs to eat more. He denies melena or tarry colored stool.

## 2024-09-10 NOTE — PATIENT PROFILE ADULT - NSPROMEDSADMININFO_GEN_A_NUR
----- Message from Vikas Fernandes sent at 4/19/2024  9:33 AM CDT -----  Regarding: Can't get RX  Type:  Needs Medical Advice    Who Called: Pt        Would the patient rather a call back or a response via [facility]? Call back    Best Call Back Number: [phone]        Additional Information: Sts that the local pharmacy [facility] did not have the Monjuro and didn't give her the RX back.  Says that she is supposed to take it tomorrow and doesn't have any and isn't sure what to do.   Please advise -- Thank you   no concerns

## 2024-09-10 NOTE — ASSESSMENT
[FreeTextEntry1] : Impression) gastric adenocarcinoma  Plan) discussed with the patient and his wife current clinical situation.  Has had what sounds like to bleeding episodes in the setting of his neoadjuvant therapy requiring hospitalization and transfusion.  For what ever reason he has not been rescoped while he was in the hospital.  Imaging not significantly changed.  And multidisciplinary discussion with Dr. Pereira we agree that at this point we should try to get him more to surgery as it does not seem he is tolerating the neoadjuvant approach.  We discussed admission to the hospital today placement of a PICC line to start intravenous nutrition as well as get an upper endoscopy to relook at the tumor and see if we can come up with an explanation for the decrease in hemoglobin.  Of note he never had a colonoscopy and we can probably do that at the same time.  Based on how he does over the next week and staying on TPN we can consider surgery towards the end of next week.  All questions answered.  We will arrange for direct admission today.  Nikko Carrasco MD  Chief Surgical Oncology Multidisciplinary GI cancer program [facility]  Professor Surgery [facility]   Time spent 35 minutes

## 2024-09-10 NOTE — H&P ADULT - NSHPPHYSICALEXAM_GEN_ALL_CORE
Neuro: Alert and Oriented X 3  Respiratory: CTA b/l. no respiratory distress  Cardiovascular: NSR, RRR  Gastrointestinal: Soft ND, non tender, no rebound or guarding  Extremities: (-) edema b/l

## 2024-09-10 NOTE — REVIEW OF SYSTEMS
[Negative] : Psychiatric [FreeTextEntry2] : recent episode of syncope [FreeTextEntry8] : as per HPI [FreeTextEntry1] : anemia

## 2024-09-10 NOTE — CONSULT NOTE ADULT - ASSESSMENT
Recommendations:  Clear Liquids  NPO MN for EGD tomorrow  medical optimization per primary team  potential colonoscopy later in week pending above     50 y/o M recently diagnosed Gastric Ca 7/2024, two hospitalizations with episodes of syncope and anemia    Recommendations:  Clear Liquids  NPO MN for EGD tomorrow  medical optimization per primary team  potential colonoscopy later in week pending above    We discussed the plan with Dr. Carrasco, patient, and wife    Thank you for the courtesy of this consult. We will follow along with you.    Venkatesh Ortega M.D.  Gastroenterology Fellow  GI Consult Weekday 7am-5pm Pager: [phone]  Weeknights/Weekend/Holiday Coverage: Please Call the  for contact info  Follow Up Questions welcome via [place] [employer] Teams Messenger

## 2024-09-10 NOTE — H&P ADULT - NSICDXPASTSURGICALHX_GEN_ALL_CORE_FT
PAST SURGICAL HISTORY:  History of esophagogastroduodenoscopy (EGD)     Status post laparoscopic procedure

## 2024-09-10 NOTE — PHYSICAL EXAM
[Normal] : oriented to person, place and time, with appropriate affect [de-identified] : anicteric [de-identified] : thin, in no acute distress [de-identified] : S1,S2, regular rate and rhythm. No murmurs heard. [de-identified] : Clear throughout. No wheezes heard. [de-identified] : warm and dry

## 2024-09-10 NOTE — PHYSICAL EXAM
[Restricted in physically strenuous activity but ambulatory and able to carry out work of a light or sedentary nature] : Status 1- Restricted in physically strenuous activity but ambulatory and able to carry out work of a light or sedentary nature, e.g., light house work, office work [Normal] : affect appropriate [de-identified] : supple [de-identified] : normal RR, breathing pattern [de-identified] : normal HR [de-identified] : no edema noted [de-identified] : soft, nondistended [de-identified] : Right chest wall port, no concerns for infection.

## 2024-09-10 NOTE — HISTORY OF PRESENT ILLNESS
[de-identified] : Isrrael Cortez is a 49 year old male here for follow up of gastric cancer.   Oncology history: 1.  gastric adenocarcinoma -presented to [facility] ER on 7/11/24 with severe symptomatic anemia, epigastric pain, and weight loss of 20lbs over 3 months.  s/p 3 units of PRBC during admission.  labs confirmed iron deficiency - CT AP on 7/11/24 showed mural thickening at the gastric antrum may represent gastritis or gastric cancer.  0.9 cm sclerotic focus in the left iliac bone may represent a bone island.  - s/p EGD with a biopsy on 7/12/24, notable for large ulcerated mass on lesser curvature of stomach, c/w malignancy. No active bleeding.  Biopsy of the gastric mass confirmed adenocarcinoma, moderately differentiated in background of ulceration and Helicobacter pylori associated gastritis and intestinal metaplasia, MMR intact, HER2/khang:  1+ score, negative.  PD-L1/CPS : 4 - CT chest on 7/25/24 (at [facility] due to insurance reasons): no evidence of metastatic disease in chest  - Diagnostic laparoscopy (Dr. Carrasco) on 7/31/24, notable for tumor along lesser curvature, clinically T2-T3 disease. Peritoneal washings with rare groups of atypical, degenerated cells.  No peritoneal carcinomatosis. - neoadjuvant FLOT chemotherapy initiated 8/14/24.  C1 complicated by hospitalization 8/21/24 - 8/26/24, for symptomatic anemia 2/2 suspected UGIB. s/p pRBC x3, no other acute interventions required  s/p cycle 2 FLOT on 8/28/24,  admitted to [facility] 9/7/24-9/9/24 with syncopal episode 2/ severe anemia again despite mid cycle count check  PMH: HTN PSH:  none  FH: aunt with breast ca SH: nonsmoker, no drug/alcohol,  at high school,  (wife Cora) with 2 teenagers, lives in [place] [de-identified] : Here for follow up after discharge,  accompanied by wife, Cora. Pt was hospitalized again at [facility] from 9/7/24-9/9/24 for syncope and symptomatic anemia, found to have Hgb 6.9 and received  3 units of PRBC.  he lost 6-7 lbs since last visit, noticed  some blood in his nose this morning. No nausea. or vomiting.  otherwise he feels fine at this visit.  he denies taking any medications or feeling unusual the day before his admission for syncope.  In fact he woke a few hours before the event to go to the bathroom and felt fine.  He passed out in his bedroom in the early morning hours on 9/7 after feeling "something off" when he attempted to get OOB.  + LOC.  no incontinence of bladder or bowel.  No melena or hematochezia in jose preceding days.  Was eating and drinking well.  Hgb had dropped from 8.8 on Tues 9/3 to 6.9 on Sat 9/9 AM.

## 2024-09-11 LAB
BLD GP AB SCN SERPL QL: NEGATIVE — SIGNIFICANT CHANGE UP
HCT VFR BLD CALC: 29 % — LOW (ref 39–50)
HGB BLD-MCNC: 9 G/DL — LOW (ref 13–17)
MCHC RBC-ENTMCNC: 29.4 PG — SIGNIFICANT CHANGE UP (ref 27–34)
MCHC RBC-ENTMCNC: 31 GM/DL — LOW (ref 32–36)
MCV RBC AUTO: 94.8 FL — SIGNIFICANT CHANGE UP (ref 80–100)
NRBC # BLD: 0 /100 WBCS — SIGNIFICANT CHANGE UP (ref 0–0)
PLATELET # BLD AUTO: 199 K/UL — SIGNIFICANT CHANGE UP (ref 150–400)
RBC # BLD: 3.06 M/UL — LOW (ref 4.2–5.8)
RBC # FLD: 18.6 % — HIGH (ref 10.3–14.5)
RH IG SCN BLD-IMP: POSITIVE — SIGNIFICANT CHANGE UP
WBC # BLD: 4.17 K/UL — SIGNIFICANT CHANGE UP (ref 3.8–10.5)
WBC # FLD AUTO: 4.17 K/UL — SIGNIFICANT CHANGE UP (ref 3.8–10.5)

## 2024-09-11 PROCEDURE — 99231 SBSQ HOSP IP/OBS SF/LOW 25: CPT

## 2024-09-11 PROCEDURE — 99232 SBSQ HOSP IP/OBS MODERATE 35: CPT

## 2024-09-11 PROCEDURE — 43235 EGD DIAGNOSTIC BRUSH WASH: CPT | Mod: GC

## 2024-09-11 RX ORDER — ELECTROLYTE SOLUTION
1 VIAL (ML) INTRAVENOUS
Refills: 0 | Status: DISCONTINUED | OUTPATIENT
Start: 2024-09-11 | End: 2024-09-11

## 2024-09-11 RX ORDER — FERROUS SULFATE 325(65) MG
325 TABLET ORAL DAILY
Refills: 0 | Status: DISCONTINUED | OUTPATIENT
Start: 2024-09-11 | End: 2024-09-11

## 2024-09-11 RX ORDER — IRON SUCROSE 20 MG/ML
500 INJECTION, SOLUTION INTRAVENOUS EVERY 24 HOURS
Refills: 0 | Status: DISCONTINUED | OUTPATIENT
Start: 2024-09-11 | End: 2024-09-11

## 2024-09-11 RX ORDER — FERROUS SULFATE 325(65) MG
325 TABLET ORAL DAILY
Refills: 0 | Status: DISCONTINUED | OUTPATIENT
Start: 2024-09-12 | End: 2024-09-14

## 2024-09-11 RX ORDER — IRON SUCROSE 20 MG/ML
500 INJECTION, SOLUTION INTRAVENOUS ONCE
Refills: 0 | Status: DISCONTINUED | OUTPATIENT
Start: 2024-09-11 | End: 2024-09-11

## 2024-09-11 RX ORDER — I.V. FAT EMULSION 20 G/100ML
0.86 EMULSION INTRAVENOUS
Qty: 50 | Refills: 0 | Status: DISCONTINUED | OUTPATIENT
Start: 2024-09-11 | End: 2024-09-11

## 2024-09-11 RX ADMIN — I.V. FAT EMULSION 20.83 GM/KG/DAY: 20 EMULSION INTRAVENOUS at 19:25

## 2024-09-11 RX ADMIN — Medication 1 GRAM(S): at 18:01

## 2024-09-11 RX ADMIN — Medication 1 GRAM(S): at 00:19

## 2024-09-11 RX ADMIN — PANTOPRAZOLE SODIUM 40 MILLIGRAM(S): 40 TABLET, DELAYED RELEASE ORAL at 18:04

## 2024-09-11 RX ADMIN — PANTOPRAZOLE SODIUM 40 MILLIGRAM(S): 40 TABLET, DELAYED RELEASE ORAL at 06:13

## 2024-09-11 RX ADMIN — CHLORHEXIDINE GLUCONATE ORAL RINSE 1 APPLICATION(S): 1.2 SOLUTION DENTAL at 06:13

## 2024-09-11 RX ADMIN — Medication 1 GRAM(S): at 06:13

## 2024-09-11 RX ADMIN — Medication 1 EACH: at 19:25

## 2024-09-11 NOTE — DIETITIAN INITIAL EVALUATION ADULT - REASON INDICATOR FOR ASSESSMENT
Nutrition consult warranted for: TPN/PPN consult   Information obtained from: electronic medical record and patient  Chart reviewed, events noted.

## 2024-09-11 NOTE — DISCHARGE NOTE PROVIDER - HOSPITAL COURSE
Patient is a 49M with HTN (not on meds), recently diagnosed with gastric cancer on 7/2024 now s/p diagnostic laparoscopy with peritoneal washings on 7/31/24, who presented to [facility] after seeing Dr. Carrasco on 9/10/24 in the office with complaints of black bowel movements the day before. Patient follows Dr. Vega outpatient, is on neoadjuvant chemo therapy (last session being 8/28), has lost approximately 30lbs unintentionally since diagnosis. Recently, patient was admitted to OSH on 9/7 for syncope with Hg of 6.9 requiring 2 units of pRBCs. Denied fever, chills, n/v/d, cp, sob, hematemesis. No history of previous colonoscopy. Patient was admitted to general surgery service on 9/10 with plan for EGD the following day to assess for sources of bleeding. EGD findings revealed __________. PICC was placed for TPN with goal of nutritional optimization.        ** last uopdated 9/11 **     Patient is a 49M with HTN (not on meds), recently diagnosed with gastric cancer on 7/2024 now s/p diagnostic laparoscopy with peritoneal washings on 7/31/24, who presented to [facility] after seeing Dr. Carrasco on 9/10/24 in the office with complaints of black bowel movements the day before. Patient follows Dr. Vega outpatient, is on neoadjuvant chemo therapy (last session being 8/28), has lost approximately 30lbs unintentionally since diagnosis. Recently, patient was admitted to OSH on 9/7 for syncope with Hg of 6.9 requiring 2 units of pRBCs. Denied fever, chills, n/v/d, cp, sob, hematemesis. No history of previous colonoscopy. Patient was admitted to general surgery service on 9/10 with plan for EGD the following day to assess for sources of bleeding. EGD findings revealed an ulcerated 5cm mass with stigmata of recent bleeding of malignant appearance at lesser curvature of the stomach causing a partial obstruction. The scope could not traverse past the lesion. Endoscopic findings were suggestive of the known gastric malignancy/tumor and was likely the cause of anemia given the friability and oozing with minimal contact. PICC was placed for TPN with goal of nutritional optimization. Patient was taken to the OR on 9/19 for a robotic distal gastrectomy with BR II. No perioperative complications were noted. Diet was advanced as tolerated and per return of bowel function.     At time of discharge pt is tolerating diet, pain well controlled, pt is ambulating/voiding freely, having adequate bowel function. Pt is hemodynamically normal/stable and medically ready for discharge with outpatient follow-up.      ** last uopdated 9/23 **     Patient is a 49M with HTN (not on meds), recently diagnosed with gastric cancer on 7/2024 now s/p diagnostic laparoscopy with peritoneal washings on 7/31/24, who presented to [facility] after seeing Dr. Carrasco on 9/10/24 in the office with complaints of black bowel movements the day before. Patient follows Dr. Vega outpatient, is on neoadjuvant chemo therapy (last session being 8/28), has lost approximately 30lbs unintentionally since diagnosis. Recently, patient was admitted to OSH on 9/7 for syncope with Hg of 6.9 requiring 2 units of pRBCs. Denied fever, chills, n/v/d, cp, sob, hematemesis. No history of previous colonoscopy. Patient was admitted to general surgery service on 9/10 with plan for EGD the following day to assess for sources of bleeding. EGD findings revealed an ulcerated 5cm mass with stigmata of recent bleeding of malignant appearance at lesser curvature of the stomach causing a partial obstruction. The scope could not traverse past the lesion. Endoscopic findings were suggestive of the known gastric malignancy/tumor and was likely the cause of anemia given the friability and oozing with minimal contact. PICC was placed for TPN with goal of nutritional optimization. Patient was taken to the OR on 9/19 for a robotic distal gastrectomy with BR II. No perioperative complications were noted. Diet was advanced as tolerated and per return of bowel function.     At time of discharge pt is tolerating diet, pain well controlled, pt is ambulating/voiding freely, having adequate bowel function. Pt is hemodynamically normal/stable and medically ready for discharge with outpatient follow-up.      ** last updated 9/23 **     Patient is a 49M with HTN (not on meds), recently diagnosed with gastric cancer on 7/2024 now s/p diagnostic laparoscopy with peritoneal washings on 7/31/24, who presented to [facility] after seeing Dr. Carrasco on 9/10/24 in the office with complaints of black bowel movements the day before. Patient follows Dr. Vega outpatient, is on neoadjuvant chemo therapy (last session being 8/28), has lost approximately 30lbs unintentionally since diagnosis. Recently, patient was admitted to OSH on 9/7 for syncope with Hg of 6.9 requiring 2 units of pRBCs. Denied fever, chills, n/v/d, cp, sob, hematemesis. No history of previous colonoscopy. Patient was admitted to general surgery service on 9/10 with plan for EGD the following day to assess for sources of bleeding. EGD findings revealed an ulcerated 5cm mass with stigmata of recent bleeding of malignant appearance at lesser curvature of the stomach causing a partial obstruction. The scope could not traverse past the lesion. Endoscopic findings were suggestive of the known gastric malignancy/tumor and was likely the cause of anemia given the friability and oozing with minimal contact. PICC was placed for TPN with goal of nutritional optimization. Patient was taken to the OR on 9/19 for a robotic distal gastrectomy with BR II. No perioperative complications were noted. Diet was advanced as tolerated and per return of bowel function.     At time of discharge pt is tolerating diet, pain well controlled, pt is ambulating/voiding freely, having adequate bowel function. Pt is hemodynamically normal/stable and medically ready for discharge with outpatient follow-up.     Patient is a 49M with HTN (not on meds), recently diagnosed with gastric cancer on 7/2024 now s/p diagnostic laparoscopy with peritoneal washings on 7/31/24, who presented to [facility] after seeing Dr. Carrasco on 9/10/24 in the office with complaints of black bowel movements the day before. Patient follows Dr. Pereira outpatient, is on neoadjuvant chemo therapy (last session being 8/28), has lost approximately 30lbs unintentionally since diagnosis. Recently, patient was admitted to OSH on 9/7 for syncope with Hg of 6.9 requiring 2 units of pRBCs. Denied fever, chills, n/v/d, cp, sob, hematemesis. No history of previous colonoscopy. Patient was admitted to general surgery service on 9/10 with plan for EGD the following day to assess for sources of bleeding. EGD findings revealed an ulcerated 5cm mass with stigmata of recent bleeding of malignant appearance at lesser curvature of the stomach causing a partial obstruction. The scope could not traverse past the lesion. Endoscopic findings were suggestive of the known gastric malignancy/tumor and was likely the cause of anemia given the friability and oozing with minimal contact. PICC was placed for TPN with goal of nutritional optimization. Patient was taken to the OR on 9/19 for a robotic distal gastrectomy with BR II. No perioperative complications were noted. Diet was advanced as tolerated and per return of bowel function.     At time of discharge pt is tolerating diet, pain well controlled, pt is ambulating/voiding freely, having adequate bowel function. Pt is hemodynamically normal/stable and medically ready for discharge with outpatient follow-up.    Final pathology showed adenocarcinoma of the stomach with 9 positive lymph nodes

## 2024-09-11 NOTE — PRE-ANESTHESIA EVALUATION ADULT - NSANTHAGERD_ENT_A_CORE
Refill request for ritalin 20mg    Last refill 2/4/22 for #60    Last visit 12/2021    Next visit scheduled 3/2022   Yes

## 2024-09-11 NOTE — DIETITIAN INITIAL EVALUATION ADULT - ADD RECOMMEND
1. If teams wishes to pursue TPN via PICC as supplementary nutrition recommend: 245g Dextrose, 75g AA, 50g 20% Lipids to provide in total  1635Kcal, g protein, 2.9GIR of based on actual body weight (58.1kg), 23nonprotien kcal/kg, 1.3 grams protein/kg actual body weight (58.1kg)  Recommend checking TG before starting TPN and then check TG weekly. Check Magnesium, Phosphorus, potassium daily and POC BG Q6hrs. Trend daily weights. Fluids and electrolytes per MD discretion.   >>Start at 150g Dextrose on Day 1, and advance to goal of 245g Dextrose on Day 2.   2. When feasible, Continue with Regular diet per MD discretion  - Reccommend Ensure High Protien 2x/day [provides 350 kcals, 20g protein each]  3. Weekly lipid panel while on TPN, next 9/17  - hold lipid inj. if TG >400  4. Daily BMP/Mg/Phos, fingersticks Q4-6hrs  - monitor lytes closely & replete outside TPN bag prn  5. Pain regimen per team    1. Recommend continue with PO diet: Regular   >>Recommend Ensure High Protein 2x/day [provides 350 kcals, 20g protein each]  2. If medically feasible consider EN feeds as supplementary nutrition; Recommend overnight cyclic feeds of Jevity 1.5 @ GOAL 75ml/hr x 12hrs via optimal route (NGT/PEG/PEJ) plus 1 Liquid Protein Supplement (100kcal, 15g protein provided) Provides: 900ml total volume, 1450kcal, 72g protein, 684mL free H2O, 20nonprotien kcal/kg, 1.2g protein/kg actual body weight (58.1kg). Recommend starting at 10mL/hr and advancing by [id number] to goal as tolerated. Additional free H2O per team. Monitor for signs and symptoms of intolerance; maintain aspiration precautions at all times.  3. If teams wishes to pursue TPN via PICC as supplementary nutrition recommend: 245g Dextrose, 75g AA, 50g 20% Lipids to provide in total  1635Kcal, g protein, 2.9GIR of based on actual body weight (58.1kg), 23nonprotien kcal/kg, 1.3 grams protein/kg actual body weight (58.1kg)  Recommend checking TG before starting TPN and then check TG weekly. Check Magnesium, Phosphorus, potassium daily and POC BG Q6hrs. Trend daily weights. Fluids and electrolytes per MD discretion.   >>Start at 150g Dextrose on Day 1, and advance to goal of 245g Dextrose on Day 2.   4. Weekly lipid panel while on TPN, next 9/17  - hold lipid inj. if TG >400  5. Daily BMP/Mg/Phos, fingersticks Q4-6hrs  - monitor lytes closely & replete outside TPN bag prn  6. Pain regimen per team

## 2024-09-11 NOTE — DISCHARGE NOTE PROVIDER - CARE PROVIDER_API CALL
Nikko Carrasco  Surgical Oncology  [address] [address]  Phone: [phone]  Fax: [phone]  Follow Up Time:

## 2024-09-11 NOTE — DIETITIAN INITIAL EVALUATION ADULT - NSFNSGIIOFT_GEN_A_CORE
09-11-24 @ 07:01  -  09-12-24 @ 07:00  --------------------------------------------------------  OUT:  Total OUT: 0 mL    Total NET: 567 mL      09-12-24 @ 07:01  -  09-12-24 @ 12:31  --------------------------------------------------------  OUT:  Total OUT: 0 mL    Total NET: 189 mL

## 2024-09-11 NOTE — DISCHARGE NOTE PROVIDER - NSDCACTIVITY_GEN_ALL_CORE
Showering allowed/Stairs allowed/Walking - Indoors allowed/Walking - Outdoors allowed/Follow Instructions Provided by your Surgical Team/Activity as tolerated Showering allowed/Stairs allowed/Walking - Indoors allowed/No heavy lifting/straining/Walking - Outdoors allowed/Follow Instructions Provided by your Surgical Team

## 2024-09-11 NOTE — DISCHARGE NOTE PROVIDER - NSDCFUSCHEDAPPT_GEN_ALL_CORE_FT
[facility]  AMBCHEMO  [address]  Scheduled Appointment: 09/13/2024    [facility]  AMBCHEMO  [address]  Scheduled Appointment: 09/26/2024    [facility]  AMBCHEMO  [address]  Scheduled Appointment: 09/27/2024     [facility]  AMBCHEMO  [address]  Scheduled Appointment: 09/26/2024    Marisol Pereira  [facility]  Scheduled Appointment: 09/26/2024    [facility]  AMBCHEMO  [address]  Scheduled Appointment: 09/27/2024    Nikko Carrasco  [facility]  SURGONC [address]  Scheduled Appointment: 10/01/2024     Nikko Carrasco  [facility]  SURGONC [address]  Scheduled Appointment: 10/01/2024    Marisol Pereira  [facility]  HEMONC [address]  Scheduled Appointment: 10/01/2024

## 2024-09-11 NOTE — DISCHARGE NOTE PROVIDER - DETAILS OF MALNUTRITION DIAGNOSIS/DIAGNOSES
This patient has been assessed with a concern for Malnutrition and was treated during this hospitalization for the following Nutrition diagnosis/diagnoses:     -  09/11/2024: Severe protein-calorie malnutrition

## 2024-09-11 NOTE — DISCHARGE NOTE PROVIDER - NSDCFUADDAPPT_GEN_ALL_CORE_FT
Please schedule a follow-up appointment with Dr. Carrasco within 1-2 weeks of discharge from the hospital. You  may reach his office at [phone] at your earliest convenience.    You have a series of scheduled chemo treatment appointments at [address] [place] [address] on 9/13, 9/26 and 9/27.

## 2024-09-11 NOTE — PROGRESS NOTE ADULT - ASSESSMENT
50 y/o M with HTN (not on meds) diagnosed with Gastric cancer 7/2024 s/p dx lap w/ peritoneal washings on 7/31, followed by [facility], on neoadjuvant chemo therapy (last session 8/28). Pt was sent here after seeing Dr. Carrasco today in office with complaints of black bowel movement yesterday.     EGD today  NPO for procedure  IVF while NPO  PICC  start TPN

## 2024-09-11 NOTE — PRE-ANESTHESIA EVALUATION ADULT - NSANTHPMHFT_GEN_ALL_CORE
50 y/o M recently diagnosed Gastric Ca 7/2024, two hospitalizations with episodes of syncope and anemia

## 2024-09-11 NOTE — DIETITIAN INITIAL EVALUATION ADULT - EDUCATION DIETARY MODIFICATIONS
When medically feasible and pt is able to have PO diet after EGD, Educated/Discussed the importance to prioritize protein at meal times. Reviewed good sources of protein on the menu. Educated/Discussed on ways to promote intake in setting of increased demand for nutrients. Reviewed nutrient dense options on the menu. Answered questions related to nutrition, diet, TPN. Pt receptive and verbalizes understanding./(2) meets goals/outcomes/verbalization

## 2024-09-11 NOTE — DIETITIAN INITIAL EVALUATION ADULT - NS FNS DIET ORDER
Diet, NPO after Midnight:      NPO Start Date: 10-Sep-2024,   NPO Start Time: 23:59 (09-10-24 @ 15:55)  Diet, Regular (09-10-24 @ 15:55)

## 2024-09-11 NOTE — DISCHARGE NOTE PROVIDER - NSDCFUADDINST_GEN_ALL_CORE_FT
General Discharge Instructions:  Please resume all regular home medications unless specifically advised not to take a particular medication. Also, please take any new medications as prescribed.  Please get plenty of rest, continue to ambulate several times per day, and drink adequate amounts of fluids. Avoid lifting weights greater than 5-10 lbs until you follow-up with your surgeon, who will instruct you further regarding activity restrictions.  Avoid driving or operating heavy machinery while taking pain medications.  Please follow-up with your surgeon and Primary Care Provider (PCP) as advised.    Warning Signs:  Please call your doctor or nurse practitioner if you experience the following:  *You experience new chest pain, pressure, squeezing or tightness.  *New or worsening cough, shortness of breath, or wheeze.  *If you are vomiting and cannot keep down fluids or your medications.  *You are getting dehydrated due to continued vomiting, diarrhea, or other reasons. Signs of dehydration include dry mouth, rapid heartbeat, or feeling dizzy or faint when standing.  *You see blood or dark/black material when you vomit or have a bowel movement.  *You experience burning when you urinate, have blood in your urine, or experience a discharge.  *Your pain is not improving within 8-12 hours or is not gone within 24 hours. Call or return immediately if your pain is getting worse, changes location, or moves to your chest or back.  *You have shaking chills, or fever greater than 101.5 degrees Fahrenheit or 38 degrees Celsius.  *Any change in your symptoms, or any new symptoms that concern you.   For pain management at home, please use over-the-counter tylenol/motrin every 6 hours as needed. DO NOT exceed a max daily dose of 4g for Tylenol. In case of breakthrough pain, you may take oxycodone as instructed. Please take colace twice a day to mitigate the side effects of oxycodone.    General Discharge Instructions:  Please resume all regular home medications unless specifically advised not to take a particular medication. Also, please take any new medications as prescribed.  Please get plenty of rest, continue to ambulate several times per day, and drink adequate amounts of fluids. Avoid lifting weights greater than 5-10 lbs until you follow-up with your surgeon, who will instruct you further regarding activity restrictions.  Avoid driving or operating heavy machinery while taking pain medications.  Please follow-up with your surgeon and Primary Care Provider (PCP) as advised.  Incision Care:  *Please call your doctor or nurse practitioner if you have increased pain, swelling, redness, or drainage from the incision site.  *Avoid swimming and baths until your follow-up appointment.  *You may shower, and wash surgical incisions with a mild soap and warm water. Gently pat the area dry.  *If you have staples, they will be removed at your follow-up appointment.  *If you have steri-strips, they will fall off on their own. Please remove any remaining strips 7-10 days after surgery.    Warning Signs:  Please call your doctor or nurse practitioner if you experience the following:  *You experience new chest pain, pressure, squeezing or tightness.  *New or worsening cough, shortness of breath, or wheeze.  *If you are vomiting and cannot keep down fluids or your medications.  *You are getting dehydrated due to continued vomiting, diarrhea, or other reasons. Signs of dehydration include dry mouth, rapid heartbeat, or feeling dizzy or faint when standing.  *You see blood or dark/black material when you vomit or have a bowel movement.  *You experience burning when you urinate, have blood in your urine, or experience a discharge.  *Your pain is not improving within 8-12 hours or is not gone within 24 hours. Call or return immediately if your pain is getting worse, changes location, or moves to your chest or back.  *You have shaking chills, or fever greater than 101.5 degrees Fahrenheit or 38 degrees Celsius.  *Any change in your symptoms, or any new symptoms that concern you.   For pain management at home, please use over-the-counter tylenol/motrin every 6 hours as needed. DO NOT exceed a max daily dose of 4g for Tylenol.    General Discharge Instructions:  Please resume all regular home medications unless specifically advised not to take a particular medication. Also, please take any new medications as prescribed.  Please get plenty of rest, continue to ambulate several times per day, and drink adequate amounts of fluids. Avoid lifting weights greater than 5-10 lbs until you follow-up with your surgeon, who will instruct you further regarding activity restrictions.  Avoid driving or operating heavy machinery while taking pain medications.  Please follow-up with your surgeon and Primary Care Provider (PCP) as advised.  Incision Care:  *Please call your doctor or nurse practitioner if you have increased pain, swelling, redness, or drainage from the incision site.  *Avoid swimming and baths until your follow-up appointment.  *You may shower, and wash surgical incisions with a mild soap and warm water. Gently pat the area dry.  *If you have staples, they will be removed at your follow-up appointment.  *If you have steri-strips, they will fall off on their own. Please remove any remaining strips 7-10 days after surgery.    Warning Signs:  Please call your doctor or nurse practitioner if you experience the following:  *You experience new chest pain, pressure, squeezing or tightness.  *New or worsening cough, shortness of breath, or wheeze.  *If you are vomiting and cannot keep down fluids or your medications.  *You are getting dehydrated due to continued vomiting, diarrhea, or other reasons. Signs of dehydration include dry mouth, rapid heartbeat, or feeling dizzy or faint when standing.  *You see blood or dark/black material when you vomit or have a bowel movement.  *You experience burning when you urinate, have blood in your urine, or experience a discharge.  *Your pain is not improving within 8-12 hours or is not gone within 24 hours. Call or return immediately if your pain is getting worse, changes location, or moves to your chest or back.  *You have shaking chills, or fever greater than 101.5 degrees Fahrenheit or 38 degrees Celsius.  *Any change in your symptoms, or any new symptoms that concern you.   For pain management at home, please use over-the-counter tylenol/motrin every 6 hours as needed. DO NOT exceed a max daily dose of 4g for Tylenol.    Please take ferrous sulfate 3 times a week as instructed until your follow-up appointment with Dr. Carrasco. You can also take Miralax once a day in case of constipation. Prescriptions have been sent to [facility] pharmacy.      General Discharge Instructions:  Please resume all regular home medications unless specifically advised not to take a particular medication. Also, please take any new medications as prescribed.  Please get plenty of rest, continue to ambulate several times per day, and drink adequate amounts of fluids. Avoid lifting weights greater than 5-10 lbs until you follow-up with your surgeon, who will instruct you further regarding activity restrictions.  Avoid driving or operating heavy machinery while taking pain medications.  Please follow-up with your surgeon and Primary Care Provider (PCP) as advised.  Incision Care:  *Please call your doctor or nurse practitioner if you have increased pain, swelling, redness, or drainage from the incision site.  *Avoid swimming and baths until your follow-up appointment.  *You may shower, and wash surgical incisions with a mild soap and warm water. Gently pat the area dry.  *If you have staples, they will be removed at your follow-up appointment.  *If you have steri-strips, they will fall off on their own. Please remove any remaining strips 7-10 days after surgery.    Warning Signs:  Please call your doctor or nurse practitioner if you experience the following:  *You experience new chest pain, pressure, squeezing or tightness.  *New or worsening cough, shortness of breath, or wheeze.  *If you are vomiting and cannot keep down fluids or your medications.  *You are getting dehydrated due to continued vomiting, diarrhea, or other reasons. Signs of dehydration include dry mouth, rapid heartbeat, or feeling dizzy or faint when standing.  *You see blood or dark/black material when you vomit or have a bowel movement.  *You experience burning when you urinate, have blood in your urine, or experience a discharge.  *Your pain is not improving within 8-12 hours or is not gone within 24 hours. Call or return immediately if your pain is getting worse, changes location, or moves to your chest or back.  *You have shaking chills, or fever greater than 101.5 degrees Fahrenheit or 38 degrees Celsius.  *Any change in your symptoms, or any new symptoms that concern you.

## 2024-09-11 NOTE — DIETITIAN INITIAL EVALUATION ADULT - OTHER INFO
"48 y/o M with HTN (not on meds) diagnosed with Gastric cancer 7/2024 s/p dx lap w/ peritoneal washings on 7/31, followed by [facility], on neoadjuvant chemo therapy (last session 8/28). Pt was sent here after seeing Dr. Carrasco today in office with complaints of black bowel movement yesterday. "    Visited pt at bedside on 9UR for initial assessment. States that pt has a good appetite at home at baseline and consumes nutrient dense foods. States that last week he was feeling some pain that decreased appetite. Does not follow any therapeutic diet at home. No cultural, ethnic, Church food preferences noted. Confirms allergy to shellfish, states that his mouth will start becoming itchy and then become anaphylactic. Pt reports no vitamin/mineral supplementation at home. States that he does makes his own protein shake at home. No difficulties chewing or swallowing reported per pt at time of visit. Current diet: NPO at time of visit. Plan for EGD today. Previously on Regular diet. Endorses good appetite while inpatient, states that he consumed 100% of meal yesterday. Nutrition consulted for TPN recommendations, pt noted with a functioning GI tract. If medically feasible, consider enteral feeds since GI tract is functioning to optimize nutritional status. Dosing weight: 9/10 128 pounds; UBW: 160 pounds (12/2023) Suggests 20% weight loss x ~9 months. IBW: 148 pounds, %IBW: 86%. Observed with severe depletions clavicle, orbital; moderate: temples, shoulders, buccal. Observed with overt signs and symptoms of muscle or fat wasting. Based on ASPEN guidelines, pt does in fact meet criteria for malnutrition. No issues with N/V/C/D at this time. States that he has a BM on Monday PTA. Has not had a BM since admission. 0/10 pain per flow sheets;  No Pressure Injuries per flow sheets. Alfredo Score: 20; No Edema per flow sheets. Meds reviewed. Dex 5% + NaCl .45%; Carafate. Nutritionally Pertinent Labs 9/10 Gluc: 100 (H), Triglycerides: 75 (WNL). See Nutrition Recommendations Below.

## 2024-09-11 NOTE — PROGRESS NOTE ADULT - ASSESSMENT
48 y/o M with HTN (not on meds) diagnosed with Gastric cancer 7/2024on chemotherapy who was admitted to [facility] for evaluation of his gastric cancer. Medicine consulted for optimization prior to surgery.    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   - His anemia is likely acute blood loss secondary to his gastric cancer  - s/p EGD today (9/11) with friable mass noted with partial gastric outlet obstruction  - Recommend giving iron transfusion 500mg IV QD x2 doses  - IF cleared for oral medications can send with PO iron 325mg PO daily  - trend CBC daily  - keep active T&S    #Nutrition  - as per surgery will plan for TPN  - please trend electrolytes daily  - patient educated on PICC .

## 2024-09-11 NOTE — DIETITIAN INITIAL EVALUATION ADULT - PERTINENT LABORATORY DATA
09-10    140  |  107  |  9   ----------------------------<  100<H>  4.0   |  26  |  0.61    Ca    8.7      10 Sep 2024 15:17  Phos  2.6     09-10  Mg     1.9     09-10    TPro  5.7<L>  /  Alb  3.5  /  TBili  0.3  /  DBili  x   /  AST  13  /  ALT  14  /  AlkPhos  57  09-10  A1C with Estimated Average Glucose Result: 5.2 % (09-08-24 @ 08:35)

## 2024-09-11 NOTE — PROGRESS NOTE ADULT - SUBJECTIVE AND OBJECTIVE BOX
FRANC COLON 49y Male  MRN#: [id number]         SUBJECTIVE  Patient is a 49y old Male who presents with a chief complaint of GI bleed (11 Sep 2024 11:17)  Currently admitted with the primary diagnosis of gastric cancer    Today is hospital day 1d, and this morning he is feeling okay, but a little hungry since he is NPO.         OBJECTIVE  PAST MEDICAL & SURGICAL HISTORY  HTN (hypertension)    Malignant neoplasm of stomach    History of prediabetes    History of esophagogastroduodenoscopy (EGD)    Status post laparoscopic procedure      Home Meds:  pantoprazole 40 mg oral delayed release tablet: 1 tab(s) orally once a day (before a meal) MDD: 2  sucralfate 1 g/10 mL oral suspension: 10 milliliter(s) orally 4 times a day    ALLERGIES:  No Known Drug Allergies  shellfish (Anaphylaxis)    MEDICATIONS:  STANDING MEDICATIONS  chlorhexidine 2% Cloths 1 Application(s) Topical <User Schedule>  influenza   Vaccine 0.5 milliLiter(s) IntraMuscular once  lipid, fat emulsion (Plant Based) 20% Infusion 0.8612 Gm/kG/Day IV Continuous <Continuous>  pantoprazole  Injectable 40 milliGRAM(s) IV Push every 12 hours  Parenteral Nutrition - Adult 1 Each TPN Continuous <Continuous>  sucralfate suspension 1 Gram(s) Oral every 6 hours    PRN MEDICATIONS  sodium chloride 0.9% lock flush 10 milliLiter(s) IV Push every 1 hour PRN      VITAL SIGNS: Last 24 Hours  T(C): 36.4 (11 Sep 2024 15:21), Max: 36.9 (10 Sep 2024 20:20)  T(F): 97.5 (11 Sep 2024 15:21), Max: 98.5 (10 Sep 2024 20:20)  HR: 66 (11 Sep 2024 15:21) (65 - 74)  BP: 131/80 (11 Sep 2024 15:21) (105/70 - 131/80)  BP(mean): --  RR: 18 (11 Sep 2024 15:21) (17 - 18)  SpO2: 100% (11 Sep 2024 15:21) (97% - 100%)    LABS:                        9.0    4.17  )-----------( 199      ( 11 Sep 2024 05:30 )             29.0     09-10    140  |  107  |  9   ----------------------------<  100<H>  4.0   |  26  |  0.61    Ca    8.7      10 Sep 2024 15:17  Phos  2.6     09-10  Mg     1.9     09-10    TPro  5.7<L>  /  Alb  3.5  /  TBili  0.3  /  DBili  x   /  AST  13  /  ALT  14  /  AlkPhos  57  09-10    PT/INR - ( 10 Sep 2024 15:17 )   PT: 10.7 sec;   INR: 0.94          PTT - ( 10 Sep 2024 15:17 )  PTT:32.8 sec  Urinalysis Basic - ( 10 Sep 2024 15:17 )    Color: x / Appearance: x / SG: x / pH: x  Gluc: 100 mg/dL / Ketone: x  / Bili: x / Urobili: x   Blood: x / Protein: x / Nitrite: x   Leuk Esterase: x / RBC: x / WBC x   Sq Epi: x / Non Sq Epi: x / Bacteria: x                RADIOLOGY:      PHYSICAL EXAM:  General: NAD, AAOx3  HEENT: atraumatic, normocephalic  Pulmonary: clear to auscultation bilaterally; No wheeze  Cardiovascular: Regular rate and rhythm; no murmurs, rubs or gallops. Normal S1S2  Gastrointestinal: Soft, nontender, nondistended; bowel sounds present  Musculoskeletal: 2+ peripheral pulses, no clubbing, cyanosis or edema, LUE PICC line in place  Neurology: Pt. alert and oriented, fluent speech, able to move all extremities  Skin: no rashes or lesions

## 2024-09-11 NOTE — DIETITIAN INITIAL EVALUATION ADULT - CALCULATED TO (G/KG)
Patient discharging today. Declining home health. No needs, self care. Discharge plan of care/case management plan validated with provider discharge order. Medicare pt has received, reviewed, and signed 2nd IM letter informing them of their right to appeal the discharge. Signed copied has been placed on pt bedside chart. 98.77

## 2024-09-11 NOTE — PROGRESS NOTE ADULT - SUBJECTIVE AND OBJECTIVE BOX
SUBJECTIVE: Pt seen and examined at bedside with chief. Pt denies any complaints. Pain well controlled. Tolerating diet without N/V, has been NPO since MN for procedure. last BM was 2 days ago.    MEDICATIONS  (STANDING):  chlorhexidine 2% Cloths 1 Application(s) Topical <User Schedule>  dextrose 5% + sodium chloride 0.45%. 1000 milliLiter(s) (100 mL/Hr) IV Continuous <Continuous>  influenza   Vaccine 0.5 milliLiter(s) IntraMuscular once  pantoprazole  Injectable 40 milliGRAM(s) IV Push every 12 hours  sucralfate suspension 1 Gram(s) Oral every 6 hours    MEDICATIONS  (PRN):  sodium chloride 0.9% lock flush 10 milliLiter(s) IV Push every 1 hour PRN Pre/post blood products, medications, blood draw, and to maintain line patency      Vital Signs Last 24 Hrs  T(C): 36.6 (11 Sep 2024 04:27), Max: 36.9 (10 Sep 2024 20:20)  T(F): 97.8 (11 Sep 2024 04:27), Max: 98.5 (10 Sep 2024 20:20)  HR: 72 (11 Sep 2024 04:27) (68 - 74)  BP: 105/70 (11 Sep 2024 04:27) (105/70 - 122/83)  BP(mean): --  RR: 17 (11 Sep 2024 04:27) (17 - 18)  SpO2: 98% (11 Sep 2024 04:27) (97% - 100%)    Parameters below as of 11 Sep 2024 04:27  Patient On (Oxygen Delivery Method): room air        PHYSICAL EXAM:      Constitutional: A&Ox3    Respiratory: non labored breathing, no respiratory distress    Cardiovascular: NSR, RRR    Gastrointestinal: Soft ND, NT, no rebound or guarding    Genitourinary: Voiding     Extremities: (-) edema                  I&O's Detail    10 Sep 2024 07:01  -  11 Sep 2024 07:00  --------------------------------------------------------  IN:    dextrose 5% + sodium chloride 0.45%: 400 mL  Total IN: 400 mL    OUT:    Voided (mL): 750 mL  Total OUT: 750 mL    Total NET: -350 mL          LABS:                        9.0    4.17  )-----------( 199      ( 11 Sep 2024 05:30 )             29.0     09-10    140  |  107  |  9   ----------------------------<  100<H>  4.0   |  26  |  0.61    Ca    8.7      10 Sep 2024 15:17  Phos  2.6     09-10  Mg     1.9     09-10    TPro  5.7<L>  /  Alb  3.5  /  TBili  0.3  /  DBili  x   /  AST  13  /  ALT  14  /  AlkPhos  57  09-10    PT/INR - ( 10 Sep 2024 15:17 )   PT: 10.7 sec;   INR: 0.94          PTT - ( 10 Sep 2024 15:17 )  PTT:32.8 sec  Urinalysis Basic - ( 10 Sep 2024 15:17 )    Color: x / Appearance: x / SG: x / pH: x  Gluc: 100 mg/dL / Ketone: x  / Bili: x / Urobili: x   Blood: x / Protein: x / Nitrite: x   Leuk Esterase: x / RBC: x / WBC x   Sq Epi: x / Non Sq Epi: x / Bacteria: x        RADIOLOGY & ADDITIONAL STUDIES:

## 2024-09-11 NOTE — DISCHARGE NOTE PROVIDER - NSDCMRMEDTOKEN_GEN_ALL_CORE_FT
pantoprazole 40 mg oral delayed release tablet: 1 tab(s) orally once a day (before a meal) MDD: 2  sucralfate 1 g/10 mL oral suspension: 10 milliliter(s) orally 4 times a day   ferrous sulfate 325 mg (65 mg elemental iron) oral tablet: 1 tab(s) orally 3 times a week  MiraLax oral powder for reconstitution: 17 gram(s) orally once a day as needed for  constipation  Protonix 40 mg oral delayed release tablet: 40 milligram(s) orally once a day

## 2024-09-11 NOTE — DIETITIAN INITIAL EVALUATION ADULT - OTHER CALCULATIONS
Actual body weight (58.1kg) used to calculate energy needs due to pt's current body weight within % (86%) ideal body weight. Needs adjusted for malnutrition, clinical status.

## 2024-09-11 NOTE — DIETITIAN NUTRITION RISK NOTIFICATION - ADDITIONAL COMMENTS/DIETITIAN RECOMMENDATIONS
Visited pt at bedside on 9UR for initial assessment. States that pt has a good appetite at home at baseline and consumes nutrient dense foods. States that last week he was feeling some pain that decreased appetite. Does not follow any therapeutic diet at home. No cultural, ethnic, Spiritism food preferences noted. Confirms allergy to shellfish, states that his mouth will start becoming itchy and then become anaphylactic. Pt reports no vitamin/mineral supplementation at home. States that he does makes his own protein shake at home. No difficulties chewing or swallowing reported per pt at time of visit. Current diet: NPO at time of visit. Plan for EGD today. Previously on Regular diet. Endorses good appetite while inpatient, states that he consumed 100% of meal yesterday. Nutrition consulted for TPN recommendations, pt noted with a functioning GI tract. If medically feasible, consider enteral feeds since GI tract is functioning to optimize nutritional status. Dosing weight: 9/10 128 pounds; UBW: 160 pounds (12/2023) Suggests 20% weight loss x ~9 months. IBW: 148 pounds, %IBW: 86%. Observed with severe depletions clavicle, orbital; moderate: temples, shoulders, buccal. Observed with overt signs and symptoms of muscle or fat wasting. Based on ASPEN guidelines, pt does in fact meet criteria for malnutrition. No issues with N/V/C/D at this time. States that he has a BM on Monday PTA. Has not had a BM since admission. 0/10 pain per flow sheets;  No Pressure Injuries per flow sheets. Alfredo Score: 20; No Edema per flow sheets. Meds reviewed. Dex 5% + NaCl .45%; Carafate. Nutritionally Pertinent Labs 9/10 Gluc: 100 (H), Triglycerides: 75 (WNL). See Nutrition Recommendations Below.    1. If teams wishes to pursue TPN via PICC as supplementary nutrition recommend: 245g Dextrose, 75g AA, 50g 20% Lipids to provide in total  1635Kcal, g protein, 2.9GIR of based on actual body weight (58.1kg), 23nonprotien kcal/kg, 1.3 grams protein/kg actual body weight (58.1kg)  Recommend checking TG before starting TPN and then check TG weekly. Check Magnesium, Phosphorus, potassium daily and POC BG Q6hrs. Trend daily weights. Fluids and electrolytes per MD discretion.   >>Start at 150g Dextrose on Day 1, and advance to goal of 245g Dextrose on Day 2.   2. When feasible, Continue with Regular diet per MD discretion  - Reccommend Ensure High Protien 2x/day [provides 350 kcals, 20g protein each]  3. Weekly lipid panel while on TPN, next 9/17  - hold lipid inj. if TG >400  4. Daily BMP/Mg/Phos, fingersticks Q4-6hrs  - monitor lytes closely & replete outside TPN bag prn  5. Pain regimen per team    When medically feasible and pt is able to have PO diet after EGD, Educated/Discussed the importance to prioritize protein at meal times. Reviewed good sources of protein on the menu. Educated/Discussed on ways to promote intake in setting of increased demand for nutrients. Reviewed nutrient dense options on the menu. Answered questions related to nutrition, diet, TPN. Pt receptive and verbalizes understanding.

## 2024-09-11 NOTE — DIETITIAN NUTRITION RISK NOTIFICATION - OTHER RISK FACTORS
[place] EMERGENCY DEPARTMENT ([facility])  May 26, 2019    History     Chief Complaint   Patient presents with     Fever     HPI  Angel Yanez is a 58 year old male with history of multiple myeloma (s/p BMT txp on 5/17/19) who presents to the ED with fever.  Patient states that around 7 PM today he measured his temperature twice at home and both times it was 100.5  F.  He states he has not taken Motrin or Tylenol for his fever.  He also complains of oral lesions in his throat and cough.  Patient reports he was seen earlier today in clinic and had labs drawn.  He received platelets today.  He otherwise denies rash.     PAST MEDICAL HISTORY  Past Medical History:   Diagnosis Date     GERD (gastroesophageal reflux disease)      H/O autologous stem cell transplant (H) 02/2005     Hyperlipidemia      Multiple myeloma (H) 2004     PONV (postoperative nausea and vomiting)      Psoriasis      Psoriatic arthritis (H)      PAST SURGICAL HISTORY  Past Surgical History:   Procedure Laterality Date     ARTHROPLASTY HIP       COLONOSCOPY       HERNIA REPAIR       IR CVC TUNNEL PLACEMENT > 5 YRS OF AGE  1/22/2019     IR CVC TUNNEL PLACEMENT > 5 YRS OF AGE  5/16/2019     IR CVC TUNNEL REMOVAL LEFT  2/20/2019     IR FOLLOW UP VISIT OUTPATIENT  1/24/2019     ORTHOPEDIC SURGERY       PROCURE BONE MARROW N/A 5/14/2019    Procedure: Bone Marrow [place];  Surgeon: Antwan Erickson MD;  Location: [facility] OR     TRANSPLANT       FAMILY HISTORY  Family History   Problem Relation Age of Onset     Diabetes Mother      SOCIAL HISTORY  Social History     Tobacco Use     Smoking status: Former Smoker     Smokeless tobacco: Never Used   Substance Use Topics     Alcohol use: Yes     Comment: occasionaly     MEDICATIONS  No current facility-administered medications for this encounter.      Current Outpatient Medications   Medication     acyclovir (ZOVIRAX) 800 MG tablet     azithromycin (ZITHROMAX) 250 MG tablet     calcium  "carbonate (CALCIUM CARBONATE) 600 MG tablet     Cholecalciferol (VITAMIN D3) 2000 units CAPS     fluconazole (DIFLUCAN) 200 MG tablet     loratadine (CLARITIN) 10 MG tablet     omeprazole (PRILOSEC OTC) 20 MG tablet     PARoxetine (PAXIL) 20 MG tablet     prochlorperazine (COMPAZINE) 5 MG tablet     [START ON 6/17/2019] sulfamethoxazole-trimethoprim (BACTRIM DS/SEPTRA DS) 800-160 MG tablet     acetaminophen (TYLENOL) 325 MG tablet     calcipotriene (DOVONOX) 0.005 % SOLN solution     Facility-Administered Medications Ordered in Other Encounters   Medication     heparin 100 UNIT/ML injection 5 mL     heparin lock flush 10 UNIT/ML injection 5 mL     ALLERGIES  Allergies   Allergen Reactions     Avalide Hives     Chloroxylenol Rash     Technicare solution     Lorazepam Hives     Other reaction(s): Hives       Moxifloxacin Hives       I have reviewed the Medications, Allergies, Past Medical and Surgical History, and Social History in the Epic system.    Review of Systems   Constitutional: Positive for fever.   HENT: Positive for mouth sores.    Respiratory: Positive for cough.    Skin: Negative for rash.   All other systems reviewed and are negative.      Physical Exam   BP: 141/86  Pulse: 97  Heart Rate: 112  Temp: 99.4  F (37.4  C)  Resp: 18  Height: 175.3 cm (5' 9\")  Weight: 81.2 kg (179 lb)  SpO2: 98 %      Physical Exam   Constitutional: He is oriented to person, place, and time. Vital signs are normal.  Non-toxic appearance. He does not have a sickly appearance. He does not appear ill. No distress.   Comfortably resting, lying in bed, NAD, nondiaphoretic, lucid, fully conversant, no  respiratory distress, alert and oriented.     HENT:   Head: Normocephalic and atraumatic.   Nose: Nose normal.   Mouth/Throat: Oropharynx is clear and moist.   Eyes: Pupils are equal, round, and reactive to light. EOM are normal. Right eye exhibits no discharge. Left eye exhibits no discharge.   Cardiovascular: Normal rate and intact " distal pulses.   Pulmonary/Chest: Effort normal. No respiratory distress.   Abdominal: Soft. There is no tenderness.   Musculoskeletal: Normal range of motion. He exhibits no edema.   Neurological: He is alert and oriented to person, place, and time.   Skin: Skin is warm and dry. Capillary refill takes less than 2 seconds. No rash noted. No erythema. No pallor.       ED Course        Procedures   8:47 PM  The patient was seen and examined by Dr. Canales in Room 20.                Critical Care time:  none         Results for orders placed or performed during the hospital encounter of 05/26/19   XR Chest 2 Views    Narrative    XR CHEST 2 VW  5/26/2019 9:15 PM      HISTORY: BMT fever    COMPARISON: 5/6/2019    FINDINGS: Frontal and lateral views of the chest. The cardiac  silhouette size and pulmonary vasculature are within normal limits.  There is no significant pleural effusion or pneumothorax. Streaky left  basilar opacities are consistent with atelectasis. The visualized  upper abdomen and bones appear normal. Old compression deformities in  the spine. Right basilar calcified granuloma.      Impression    IMPRESSION: Streaky left basilar atelectasis. No focal pneumonia.    I have personally reviewed the examination and initial interpretation  and I agree with the findings.    OSCAR HANCOCK MD   CBC with platelets differential   Result Value Ref Range    WBC 0.0 (LL) 4.0 - 11.0 10e9/L    RBC Count 2.39 (L) 4.4 - 5.9 10e12/L    Hemoglobin 7.7 (L) 13.3 - 17.7 g/dL    Hematocrit 23.6 (L) 40.0 - 53.0 %    MCV 99 78 - 100 fl    MCH 32.2 26.5 - 33.0 pg    MCHC 32.6 31.5 - 36.5 g/dL    RDW 12.0 10.0 - 15.0 %    Platelet Count 37 (LL) 150 - 450 10e9/L    Diff Method Manual Differential    Comprehensive metabolic panel   Result Value Ref Range    Sodium 139 133 - 144 mmol/L    Potassium 4.1 3.4 - 5.3 mmol/L    Chloride 106 94 - 109 mmol/L    Carbon Dioxide 27 20 - 32 mmol/L    Anion Gap 6 3 - 14 mmol/L    Glucose 101  (H) 70 - 99 mg/dL    Urea Nitrogen 16 7 - 30 mg/dL    Creatinine 0.92 0.66 - 1.25 mg/dL    GFR Estimate 90 >60 mL/min/[1.73_m2]    GFR Estimate If Black >90 >60 mL/min/[1.73_m2]    Calcium 8.0 (L) 8.5 - 10.1 mg/dL    Bilirubin Total 0.3 0.2 - 1.3 mg/dL    Albumin 3.3 (L) 3.4 - 5.0 g/dL    Protein Total 6.0 (L) 6.8 - 8.8 g/dL    Alkaline Phosphatase 54 40 - 150 U/L    ALT 17 0 - 70 U/L    AST 8 0 - 45 U/L   Lactic acid whole blood   Result Value Ref Range    Lactic Acid 1.1 0.7 - 2.0 mmol/L   UA reflex to Microscopic and Culture   Result Value Ref Range    Color Urine Yellow     Appearance Urine Clear     Glucose Urine Negative NEG^Negative mg/dL    Bilirubin Urine Negative NEG^Negative    Ketones Urine Negative NEG^Negative mg/dL    Specific Gravity Urine 1.022 1.003 - 1.035    Blood Urine Negative NEG^Negative    pH Urine 6.0 5.0 - 7.0 pH    Protein Albumin Urine Negative NEG^Negative mg/dL    Urobilinogen mg/dL Normal 0.0 - 2.0 mg/dL    Nitrite Urine Negative NEG^Negative    Leukocyte Esterase Urine Negative NEG^Negative    Source Midstream Urine    Blood culture   Result Value Ref Range    Specimen Description Blood Hsieh     Special Requests Received in aerobic bottle only     Culture Micro PENDING    Blood culture   Result Value Ref Range    Specimen Description Blood Hsieh     Special Requests Received in aerobic bottle only     Culture Micro PENDING      Medications   ceFEPIme (MAXIPIME) 1g vial to attach to  ml bag for ADULTS or NS 50 ml bag for PEDS (0 g Intravenous Stopped 5/26/19 2300)     I have reviewed the patient's prior chart including recent labs, ER visits, and available inpatient discharge summaries if available.  On re-evaluation, the patient remained stable and symptoms have not changed significantly since he has been mostly asymptomatic.  Discussed results and plan including starting antibiotics and admitted to [facility] for neutropenic fever.         Labs Ordered and Resulted from  Time of ED Arrival Up to the Time of Departure from the ED   CBC WITH PLATELETS DIFFERENTIAL - Abnormal; Notable for the following components:       Result Value    WBC 0.0 (*)     RBC Count 2.39 (*)     Hemoglobin 7.7 (*)     Hematocrit 23.6 (*)     Platelet Count 37 (*)     All other components within normal limits   COMPREHENSIVE METABOLIC PANEL - Abnormal; Notable for the following components:    Glucose 101 (*)     Calcium 8.0 (*)     Albumin 3.3 (*)     Protein Total 6.0 (*)     All other components within normal limits   LACTIC ACID WHOLE BLOOD   UA MACROSCOPIC WITH REFLEX TO MICRO AND CULTURE   BLOOD CULTURE   BLOOD CULTURE            Assessments & Plan (with Medical Decision Making)   This is a 58-year-old male coming into the emergency room with concerns of a fever today with a recent BMT 10 days ago.  On initial assessment the patient has normal vital signs and does not appear toxic or septic in any way.  He has no significant complaints at this time.  His physical exam was grossly unremarkable.  Labs were drawn which shows that he is neutropenic, urine is unremarkable, chest x-ray is also unremarkable.  The case was discussed with heme-onc and at this time he is afebrile with no medications provided for fever.  Their recommendation is to start cefepime and admit to their service for overnight evaluation.  Patient understands and agrees with disposition planning.    I have reviewed the nursing notes.    I have reviewed the findings, diagnosis, plan and need for follow up with the patient.       Medication List      There are no discharge medications for this visit.         Final diagnoses:   Neutropenic fever (H)     Jovanny BURNS, am serving as a trained medical scribe to document services personally performed by Angel Canales MD, based on the provider's statements to me.      Angel BURNS MD, was physically present and have reviewed and verified the accuracy of this note documented by Jovanny  "Jacquelyn.     \"This dictation was performed with the assistance of voice recognition software and may contain inadvertant transcription  errors,  omissions and/or  inadvertent word substitution.\" --Angel Canales MD     5/26/2019   [facility], [place], EMERGENCY DEPARTMENT     Angel Canales MD  05/27/19 0016    " Not applicable

## 2024-09-11 NOTE — DIETITIAN INITIAL EVALUATION ADULT - PERTINENT MEDS FT
MEDICATIONS  (STANDING):  chlorhexidine 2% Cloths 1 Application(s) Topical <User Schedule>  dextrose 5% + sodium chloride 0.45%. 1000 milliLiter(s) (100 mL/Hr) IV Continuous <Continuous>  influenza   Vaccine 0.5 milliLiter(s) IntraMuscular once  lipid, fat emulsion (Plant Based) 20% Infusion 0.8612 Gm/kG/Day (20.8 mL/Hr) IV Continuous <Continuous>  pantoprazole  Injectable 40 milliGRAM(s) IV Push every 12 hours  Parenteral Nutrition - Adult 1 Each (63 mL/Hr) TPN Continuous <Continuous>  sucralfate suspension 1 Gram(s) Oral every 6 hours    MEDICATIONS  (PRN):  sodium chloride 0.9% lock flush 10 milliLiter(s) IV Push every 1 hour PRN Pre/post blood products, medications, blood draw, and to maintain line patency

## 2024-09-12 ENCOUNTER — APPOINTMENT (OUTPATIENT)
Dept: HEMATOLOGY ONCOLOGY | Facility: CLINIC | Age: 49
End: 2024-09-12

## 2024-09-12 PROCEDURE — 99231 SBSQ HOSP IP/OBS SF/LOW 25: CPT

## 2024-09-12 PROCEDURE — 99232 SBSQ HOSP IP/OBS MODERATE 35: CPT

## 2024-09-12 RX ORDER — SODIUM FERRIC GLUCONATE COMPLEX IN SUCROSE 12.5 MG/ML
125 INJECTION INTRAVENOUS ONCE
Refills: 0 | Status: COMPLETED | OUTPATIENT
Start: 2024-09-12 | End: 2024-09-12

## 2024-09-12 RX ORDER — I.V. FAT EMULSION 20 G/100ML
0.86 EMULSION INTRAVENOUS
Qty: 50 | Refills: 0 | Status: DISCONTINUED | OUTPATIENT
Start: 2024-09-12 | End: 2024-09-12

## 2024-09-12 RX ORDER — ELECTROLYTE SOLUTION
1 VIAL (ML) INTRAVENOUS
Refills: 0 | Status: DISCONTINUED | OUTPATIENT
Start: 2024-09-12 | End: 2024-09-12

## 2024-09-12 RX ADMIN — Medication 325 MILLIGRAM(S): at 11:30

## 2024-09-12 RX ADMIN — Medication 1 GRAM(S): at 11:31

## 2024-09-12 RX ADMIN — SODIUM FERRIC GLUCONATE COMPLEX IN SUCROSE 110 MILLIGRAM(S): 12.5 INJECTION INTRAVENOUS at 13:10

## 2024-09-12 RX ADMIN — I.V. FAT EMULSION 20.8 GM/KG/DAY: 20 EMULSION INTRAVENOUS at 17:59

## 2024-09-12 RX ADMIN — CHLORHEXIDINE GLUCONATE ORAL RINSE 1 APPLICATION(S): 1.2 SOLUTION DENTAL at 05:47

## 2024-09-12 RX ADMIN — PANTOPRAZOLE SODIUM 40 MILLIGRAM(S): 40 TABLET, DELAYED RELEASE ORAL at 05:48

## 2024-09-12 RX ADMIN — PANTOPRAZOLE SODIUM 40 MILLIGRAM(S): 40 TABLET, DELAYED RELEASE ORAL at 17:59

## 2024-09-12 RX ADMIN — Medication 1 GRAM(S): at 17:59

## 2024-09-12 RX ADMIN — Medication 1 GRAM(S): at 00:19

## 2024-09-12 RX ADMIN — Medication 1 EACH: at 17:58

## 2024-09-12 RX ADMIN — Medication 1 GRAM(S): at 05:47

## 2024-09-12 NOTE — PROGRESS NOTE ADULT - ASSESSMENT
50 y/o M with HTN (not on meds) diagnosed with Gastric cancer 7/2024 s/p dx lap w/ peritoneal washings on 7/31, followed by [facility], on neoadjuvant chemo therapy (last session 8/28). Pt was sent here after seeing Dr. Carrasco today in office with complaints of black bowel movement yesterday.     Pureed  PICC/TPN  no AM labs  Dispo: TPN 50 y/o M with HTN (not on meds) diagnosed with Gastric cancer 7/2024 s/p dx lap w/ peritoneal washings on 7/31, followed by [facility], on neoadjuvant chemo therapy (last session 8/28). Pt was sent here after seeing Dr. Carrasco today in office with complaints of black bowel movement yesterday.     Pureed  PICC/TPN  no AM labs  Dispo: TPN  possible dc today vs tomorrow 48 y/o M with HTN (not on meds) diagnosed with Gastric cancer 7/2024 s/p dx lap w/ peritoneal washings on 7/31, followed by [facility] , on neoadjuvant chemo therapy (last session 8/28). Pt was sent here after seeing Dr. Carrasco today in office with complaints of black bowel movement yesterday.     Pureed  PICC/TPN  no AM labs  Dispo: TPN  possible dc today vs tomorrow

## 2024-09-12 NOTE — PROGRESS NOTE ADULT - ASSESSMENT
48 y/o M with HTN (not on meds) diagnosed with Gastric cancer 7/2024on chemotherapy who was admitted to [facility] for evaluation of his gastric cancer. Medicine consulted for optimization prior to surgery.    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   - His anemia is likely acute blood loss secondary to his gastric cancer  - s/p EGD today (9/11) with friable mass noted with partial gastric outlet obstruction  - Recommend giving iron transfusion 500mg IV QD x2 doses  - IF cleared for oral medications can send with PO iron 325mg PO daily  - trend CBC daily  - keep active T&S    #Nutrition  - as per surgery will plan for TPN  - please trend electrolytes daily  - patient educated on PICC .       48 y/o M with HTN (not on meds) diagnosed with Gastric cancer 7/2024on chemotherapy who was admitted to [facility] for evaluation of his gastric cancer. Medicine consulted for optimization prior to surgery.    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   - His anemia is likely acute blood loss secondary to his gastric cancer  - s/p EGD (9/11) with friable mass noted with partial gastric outlet obstruction  - Recommend giving iron transfusion. We are out of iron sucrose, thus will give ferrous gluconate 125mg daily while admitted.   - IF cleared for oral medications can discharge with PO iron 325mg PO daily  - trend CBC daily  - can send B12 & folate  - keep active T&S    #Nutrition  - as per surgery will plan for TPN  - please trend electrolytes daily  - patient educated on PICC .

## 2024-09-12 NOTE — PROGRESS NOTE ADULT - SUBJECTIVE AND OBJECTIVE BOX
FRANC COLON 49y Male  MRN#: [id number]         SUBJECTIVE  Patient is a 49y old Male who presents with a chief complaint of GI bleed (12 Sep 2024 09:04)    Today is hospital day 2d, and this morning he is _________ and reports ________ overnight events.         OBJECTIVE  PAST MEDICAL & SURGICAL HISTORY  HTN (hypertension)    Malignant neoplasm of stomach    History of prediabetes    History of esophagogastroduodenoscopy (EGD)    Status post laparoscopic procedure      Home Meds:  pantoprazole 40 mg oral delayed release tablet: 1 tab(s) orally once a day (before a meal) MDD: 2  sucralfate 1 g/10 mL oral suspension: 10 milliliter(s) orally 4 times a day    ALLERGIES:  No Known Drug Allergies  shellfish (Anaphylaxis)    MEDICATIONS:  STANDING MEDICATIONS  chlorhexidine 2% Cloths 1 Application(s) Topical <User Schedule>  ferrous    sulfate 325 milliGRAM(s) Oral daily  influenza   Vaccine 0.5 milliLiter(s) IntraMuscular once  lipid, fat emulsion (Plant Based) 20% Infusion 0.8612 Gm/kG/Day IV Continuous <Continuous>  pantoprazole  Injectable 40 milliGRAM(s) IV Push every 12 hours  Parenteral Nutrition - Adult 1 Each TPN Continuous <Continuous>  sucralfate suspension 1 Gram(s) Oral every 6 hours    PRN MEDICATIONS  sodium chloride 0.9% lock flush 10 milliLiter(s) IV Push every 1 hour PRN      VITAL SIGNS: Last 24 Hours  T(C): 36.4 (12 Sep 2024 08:35), Max: 36.8 (11 Sep 2024 13:45)  T(F): 97.5 (12 Sep 2024 08:35), Max: 98.1 (11 Sep 2024 21:04)  HR: 75 (12 Sep 2024 08:35) (64 - 75)  BP: 137/85 (12 Sep 2024 08:35) (114/75 - 137/85)  BP(mean): --  RR: 17 (12 Sep 2024 08:35) (17 - 18)  SpO2: 100% (12 Sep 2024 08:35) (96% - 100%)    LABS:                        9.0    4.17  )-----------( 199      ( 11 Sep 2024 05:30 )             29.0     09-10    140  |  107  |  9   ----------------------------<  100<H>  4.0   |  26  |  0.61    Ca    8.7      10 Sep 2024 15:17  Phos  2.6     09-10  Mg     1.9     09-10    TPro  5.7<L>  /  Alb  3.5  /  TBili  0.3  /  DBili  x   /  AST  13  /  ALT  14  /  AlkPhos  57  09-10    PT/INR - ( 10 Sep 2024 15:17 )   PT: 10.7 sec;   INR: 0.94          PTT - ( 10 Sep 2024 15:17 )  PTT:32.8 sec  Urinalysis Basic - ( 10 Sep 2024 15:17 )    Color: x / Appearance: x / SG: x / pH: x  Gluc: 100 mg/dL / Ketone: x  / Bili: x / Urobili: x   Blood: x / Protein: x / Nitrite: x   Leuk Esterase: x / RBC: x / WBC x   Sq Epi: x / Non Sq Epi: x / Bacteria: x                RADIOLOGY:      PHYSICAL EXAM:  General: NAD, AAOx3  HEENT: atraumatic, normocephalic  Pulmonary: clear to auscultation bilaterally; No wheeze  Cardiovascular: Regular rate and rhythm; no murmurs, rubs or gallops. Normal S1S2  Gastrointestinal: Soft, nontender, nondistended; bowel sounds present  Musculoskeletal: 2+ peripheral pulses, no clubbing, cyanosis or edema, LUE PICC line in place  Neurology: Pt. alert and oriented, fluent speech, able to move all extremities  Skin: no rashes or lesions       FRANC COLON 49y Male  MRN#: [id number]         SUBJECTIVE  Patient is a 49y old Male who presents with a chief complaint of GI bleed (12 Sep 2024 09:04)    Today is hospital day 2d, and this morning he is sitting in bed comfortably with no active complaints.         OBJECTIVE  PAST MEDICAL & SURGICAL HISTORY  HTN (hypertension)    Malignant neoplasm of stomach    History of prediabetes    History of esophagogastroduodenoscopy (EGD)    Status post laparoscopic procedure      Home Meds:  pantoprazole 40 mg oral delayed release tablet: 1 tab(s) orally once a day (before a meal) MDD: 2  sucralfate 1 g/10 mL oral suspension: 10 milliliter(s) orally 4 times a day    ALLERGIES:  No Known Drug Allergies  shellfish (Anaphylaxis)    MEDICATIONS:  STANDING MEDICATIONS  chlorhexidine 2% Cloths 1 Application(s) Topical <User Schedule>  ferrous    sulfate 325 milliGRAM(s) Oral daily  influenza   Vaccine 0.5 milliLiter(s) IntraMuscular once  lipid, fat emulsion (Plant Based) 20% Infusion 0.8612 Gm/kG/Day IV Continuous <Continuous>  pantoprazole  Injectable 40 milliGRAM(s) IV Push every 12 hours  Parenteral Nutrition - Adult 1 Each TPN Continuous <Continuous>  sucralfate suspension 1 Gram(s) Oral every 6 hours    PRN MEDICATIONS  sodium chloride 0.9% lock flush 10 milliLiter(s) IV Push every 1 hour PRN      VITAL SIGNS: Last 24 Hours  T(C): 36.4 (12 Sep 2024 08:35), Max: 36.8 (11 Sep 2024 13:45)  T(F): 97.5 (12 Sep 2024 08:35), Max: 98.1 (11 Sep 2024 21:04)  HR: 75 (12 Sep 2024 08:35) (64 - 75)  BP: 137/85 (12 Sep 2024 08:35) (114/75 - 137/85)  BP(mean): --  RR: 17 (12 Sep 2024 08:35) (17 - 18)  SpO2: 100% (12 Sep 2024 08:35) (96% - 100%)    LABS:                        9.0    4.17  )-----------( 199      ( 11 Sep 2024 05:30 )             29.0     09-10    140  |  107  |  9   ----------------------------<  100<H>  4.0   |  26  |  0.61    Ca    8.7      10 Sep 2024 15:17  Phos  2.6     09-10  Mg     1.9     09-10    TPro  5.7<L>  /  Alb  3.5  /  TBili  0.3  /  DBili  x   /  AST  13  /  ALT  14  /  AlkPhos  57  09-10    PT/INR - ( 10 Sep 2024 15:17 )   PT: 10.7 sec;   INR: 0.94          PTT - ( 10 Sep 2024 15:17 )  PTT:32.8 sec  Urinalysis Basic - ( 10 Sep 2024 15:17 )    Color: x / Appearance: x / SG: x / pH: x  Gluc: 100 mg/dL / Ketone: x  / Bili: x / Urobili: x   Blood: x / Protein: x / Nitrite: x   Leuk Esterase: x / RBC: x / WBC x   Sq Epi: x / Non Sq Epi: x / Bacteria: x                RADIOLOGY:      PHYSICAL EXAM:  General: NAD, AAOx3  HEENT: atraumatic, normocephalic  Pulmonary: clear to auscultation bilaterally; No wheeze  Cardiovascular: Regular rate and rhythm; no murmurs, rubs or gallops. Normal S1S2  Gastrointestinal: Soft, nontender, nondistended; bowel sounds present  Musculoskeletal: 2+ peripheral pulses, no clubbing, cyanosis or edema, LUE PICC line in place  Neurology: Pt. alert and oriented, fluent speech, able to move all extremities  Skin: no rashes or lesions

## 2024-09-12 NOTE — PROGRESS NOTE ADULT - SUBJECTIVE AND OBJECTIVE BOX
INTERVAL HPI/OVERNIGHT EVENTS: NAEON    STATUS POST:  9/11: EGD    SUBJECTIVE: Pt seen and examined at bedside this am by surgery team. Tolerating diet, pain well controlled. Denies f/n/v/cp/sob.    MEDICATIONS  (STANDING):  chlorhexidine 2% Cloths 1 Application(s) Topical <User Schedule>  ferrous    sulfate 325 milliGRAM(s) Oral daily  influenza   Vaccine 0.5 milliLiter(s) IntraMuscular once  lipid, fat emulsion (Plant Based) 20% Infusion 0.8612 Gm/kG/Day (20.83 mL/Hr) IV Continuous <Continuous>  pantoprazole  Injectable 40 milliGRAM(s) IV Push every 12 hours  Parenteral Nutrition - Adult 1 Each (63 mL/Hr) TPN Continuous <Continuous>  sucralfate suspension 1 Gram(s) Oral every 6 hours    MEDICATIONS  (PRN):  sodium chloride 0.9% lock flush 10 milliLiter(s) IV Push every 1 hour PRN Pre/post blood products, medications, blood draw, and to maintain line patency      Vital Signs Last 24 Hrs  T(C): 36.4 (12 Sep 2024 05:14), Max: 36.8 (11 Sep 2024 08:35)  T(F): 97.6 (12 Sep 2024 05:14), Max: 98.2 (11 Sep 2024 08:35)  HR: 73 (12 Sep 2024 05:14) (64 - 73)  BP: 119/75 (12 Sep 2024 05:14) (114/75 - 131/80)  BP(mean): --  RR: 17 (12 Sep 2024 05:14) (17 - 18)  SpO2: 100% (12 Sep 2024 05:14) (96% - 100%)    Parameters below as of 12 Sep 2024 05:14  Patient On (Oxygen Delivery Method): room air        PHYSICAL EXAM:      Constitutional: A&Ox3  Respiratory: non labored breathing, no respiratory distress  Cardiovascular: NSR, RRR  Gastrointestinal:  Extremities: (-) edema, wwp                  I&O's Detail    10 Sep 2024 07:01  -  11 Sep 2024 07:00  --------------------------------------------------------  IN:    dextrose 5% + sodium chloride 0.45%: 400 mL  Total IN: 400 mL    OUT:    Voided (mL): 750 mL  Total OUT: 750 mL    Total NET: -350 mL      11 Sep 2024 07:01  -  12 Sep 2024 06:10  --------------------------------------------------------  IN:    Fat Emulsion (Plant Based) 20%: 124.8 mL    TPN (Total Parenteral Nutrition): 378 mL  Total IN: 502.8 mL    OUT:    Voided (mL): 3150 mL  Total OUT: 3150 mL    Total NET: -2647.2 mL          LABS:                        9.0    4.17  )-----------( 199      ( 11 Sep 2024 05:30 )             29.0     09-10    140  |  107  |  9   ----------------------------<  100<H>  4.0   |  26  |  0.61    Ca    8.7      10 Sep 2024 15:17  Phos  2.6     09-10  Mg     1.9     09-10    TPro  5.7<L>  /  Alb  3.5  /  TBili  0.3  /  DBili  x   /  AST  13  /  ALT  14  /  AlkPhos  57  09-10    PT/INR - ( 10 Sep 2024 15:17 )   PT: 10.7 sec;   INR: 0.94          PTT - ( 10 Sep 2024 15:17 )  PTT:32.8 sec  Urinalysis Basic - ( 10 Sep 2024 15:17 )    Color: x / Appearance: x / SG: x / pH: x  Gluc: 100 mg/dL / Ketone: x  / Bili: x / Urobili: x   Blood: x / Protein: x / Nitrite: x   Leuk Esterase: x / RBC: x / WBC x   Sq Epi: x / Non Sq Epi: x / Bacteria: x        RADIOLOGY & ADDITIONAL STUDIES: INTERVAL HPI/OVERNIGHT EVENTS: NAEON    STATUS POST:  9/11: EGD    SUBJECTIVE: Pt seen and examined by chief resident. Pt is doing well, resting comfortably on bed. Diet tolerated. Ambulating out of bed. +F/-BM. No nausea or vomiting. No complaints at this time.    MEDICATIONS  (STANDING):  chlorhexidine 2% Cloths 1 Application(s) Topical <User Schedule>  ferrous    sulfate 325 milliGRAM(s) Oral daily  influenza   Vaccine 0.5 milliLiter(s) IntraMuscular once  lipid, fat emulsion (Plant Based) 20% Infusion 0.8612 Gm/kG/Day (20.83 mL/Hr) IV Continuous <Continuous>  pantoprazole  Injectable 40 milliGRAM(s) IV Push every 12 hours  Parenteral Nutrition - Adult 1 Each (63 mL/Hr) TPN Continuous <Continuous>  sucralfate suspension 1 Gram(s) Oral every 6 hours    MEDICATIONS  (PRN):  sodium chloride 0.9% lock flush 10 milliLiter(s) IV Push every 1 hour PRN Pre/post blood products, medications, blood draw, and to maintain line patency      Vital Signs Last 24 Hrs  T(C): 36.4 (12 Sep 2024 05:14), Max: 36.8 (11 Sep 2024 08:35)  T(F): 97.6 (12 Sep 2024 05:14), Max: 98.2 (11 Sep 2024 08:35)  HR: 73 (12 Sep 2024 05:14) (64 - 73)  BP: 119/75 (12 Sep 2024 05:14) (114/75 - 131/80)  BP(mean): --  RR: 17 (12 Sep 2024 05:14) (17 - 18)  SpO2: 100% (12 Sep 2024 05:14) (96% - 100%)    Parameters below as of 12 Sep 2024 05:14  Patient On (Oxygen Delivery Method): room air        PHYSICAL EXAM:    Constitutional: A&Ox3, nad  Respiratory: non labored breathing, no respiratory distress  Cardiovascular: NSR, RRR  Gastrointestinal: abd soft, NT, ND  Extremities: (-) edema, wwp                  I&O's Detail    10 Sep 2024 07:01  -  11 Sep 2024 07:00  --------------------------------------------------------  IN:    dextrose 5% + sodium chloride 0.45%: 400 mL  Total IN: 400 mL    OUT:    Voided (mL): 750 mL  Total OUT: 750 mL    Total NET: -350 mL      11 Sep 2024 07:01  -  12 Sep 2024 06:10  --------------------------------------------------------  IN:    Fat Emulsion (Plant Based) 20%: 124.8 mL    TPN (Total Parenteral Nutrition): 378 mL  Total IN: 502.8 mL    OUT:    Voided (mL): 3150 mL  Total OUT: 3150 mL    Total NET: -2647.2 mL          LABS:                        9.0    4.17  )-----------( 199      ( 11 Sep 2024 05:30 )             29.0     09-10    140  |  107  |  9   ----------------------------<  100<H>  4.0   |  26  |  0.61    Ca    8.7      10 Sep 2024 15:17  Phos  2.6     09-10  Mg     1.9     09-10    TPro  5.7<L>  /  Alb  3.5  /  TBili  0.3  /  DBili  x   /  AST  13  /  ALT  14  /  AlkPhos  57  09-10    PT/INR - ( 10 Sep 2024 15:17 )   PT: 10.7 sec;   INR: 0.94          PTT - ( 10 Sep 2024 15:17 )  PTT:32.8 sec  Urinalysis Basic - ( 10 Sep 2024 15:17 )    Color: x / Appearance: x / SG: x / pH: x  Gluc: 100 mg/dL / Ketone: x  / Bili: x / Urobili: x   Blood: x / Protein: x / Nitrite: x   Leuk Esterase: x / RBC: x / WBC x   Sq Epi: x / Non Sq Epi: x / Bacteria: x        RADIOLOGY & ADDITIONAL STUDIES:

## 2024-09-13 PROCEDURE — 99232 SBSQ HOSP IP/OBS MODERATE 35: CPT

## 2024-09-13 PROCEDURE — 99231 SBSQ HOSP IP/OBS SF/LOW 25: CPT

## 2024-09-13 RX ORDER — I.V. FAT EMULSION 20 G/100ML
0.86 EMULSION INTRAVENOUS
Qty: 50 | Refills: 0 | Status: DISCONTINUED | OUTPATIENT
Start: 2024-09-13 | End: 2024-09-13

## 2024-09-13 RX ORDER — ELECTROLYTE SOLUTION
1 VIAL (ML) INTRAVENOUS
Refills: 0 | Status: DISCONTINUED | OUTPATIENT
Start: 2024-09-13 | End: 2024-09-13

## 2024-09-13 RX ADMIN — Medication 1 GRAM(S): at 18:16

## 2024-09-13 RX ADMIN — PANTOPRAZOLE SODIUM 40 MILLIGRAM(S): 40 TABLET, DELAYED RELEASE ORAL at 18:16

## 2024-09-13 RX ADMIN — Medication 1 GRAM(S): at 05:32

## 2024-09-13 RX ADMIN — Medication 1 GRAM(S): at 23:15

## 2024-09-13 RX ADMIN — Medication 325 MILLIGRAM(S): at 11:11

## 2024-09-13 RX ADMIN — Medication 1 GRAM(S): at 00:07

## 2024-09-13 RX ADMIN — I.V. FAT EMULSION 20.83 GM/KG/DAY: 20 EMULSION INTRAVENOUS at 18:20

## 2024-09-13 RX ADMIN — Medication 1 GRAM(S): at 11:11

## 2024-09-13 RX ADMIN — CHLORHEXIDINE GLUCONATE ORAL RINSE 1 APPLICATION(S): 1.2 SOLUTION DENTAL at 05:32

## 2024-09-13 RX ADMIN — Medication 1 EACH: at 18:19

## 2024-09-13 RX ADMIN — PANTOPRAZOLE SODIUM 40 MILLIGRAM(S): 40 TABLET, DELAYED RELEASE ORAL at 05:32

## 2024-09-13 NOTE — PROGRESS NOTE ADULT - SUBJECTIVE AND OBJECTIVE BOX
FRANC COLON 49y Male  MRN#: [id number]         SUBJECTIVE  Patient is a 49y old Male who presents with a chief complaint of GI bleed (13 Sep 2024 09:47)    Today is hospital day 3d, and this morning he is resting in bed comfortably.         OBJECTIVE  PAST MEDICAL & SURGICAL HISTORY  HTN (hypertension)    Malignant neoplasm of stomach    History of prediabetes    History of esophagogastroduodenoscopy (EGD)    Status post laparoscopic procedure      Home Meds:  pantoprazole 40 mg oral delayed release tablet: 1 tab(s) orally once a day (before a meal) MDD: 2  sucralfate 1 g/10 mL oral suspension: 10 milliliter(s) orally 4 times a day    ALLERGIES:  No Known Drug Allergies  shellfish (Anaphylaxis)    MEDICATIONS:  STANDING MEDICATIONS  chlorhexidine 2% Cloths 1 Application(s) Topical <User Schedule>  ferrous    sulfate 325 milliGRAM(s) Oral daily  influenza   Vaccine 0.5 milliLiter(s) IntraMuscular once  lipid, fat emulsion (Fish Oil and Plant Based) 20% Infusion 0.8612 Gm/kG/Day IV Continuous <Continuous>  pantoprazole  Injectable 40 milliGRAM(s) IV Push every 12 hours  Parenteral Nutrition - Adult 1 Each TPN Continuous <Continuous>  Parenteral Nutrition - Adult 1 Each TPN Continuous <Continuous>  sucralfate suspension 1 Gram(s) Oral every 6 hours    PRN MEDICATIONS  sodium chloride 0.9% lock flush 10 milliLiter(s) IV Push every 1 hour PRN      VITAL SIGNS: Last 24 Hours  T(C): 36.4 (13 Sep 2024 08:47), Max: 36.8 (12 Sep 2024 17:08)  T(F): 97.5 (13 Sep 2024 08:47), Max: 98.3 (12 Sep 2024 20:25)  HR: 87 (13 Sep 2024 08:47) (74 - 87)  BP: 113/73 (13 Sep 2024 08:47) (107/72 - 128/84)  BP(mean): 84 (12 Sep 2024 23:51) (84 - 84)  RR: 17 (13 Sep 2024 08:47) (17 - 18)  SpO2: 99% (13 Sep 2024 08:47) (98% - 99%)    LABS:                        RADIOLOGY:      PHYSICAL EXAM:  General: NAD, AAOx3  HEENT: atraumatic, normocephalic  Pulmonary: clear to auscultation bilaterally; No wheeze  Cardiovascular: Regular rate and rhythm; no murmurs, rubs or gallops. Normal S1S2  Gastrointestinal: Soft, nontender, nondistended; bowel sounds present  Musculoskeletal: 2+ peripheral pulses, no clubbing, cyanosis or edema, LUE PICC line in place  Neurology: Pt. alert and oriented, fluent speech, able to move all extremities  Skin: no rashes or lesions

## 2024-09-13 NOTE — PROGRESS NOTE ADULT - ASSESSMENT
48 y/o M with HTN (not on meds) diagnosed with Gastric cancer 7/2024on chemotherapy who was admitted to [facility] for evaluation of his gastric cancer. Medicine consulted for optimization prior to surgery.    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   - His anemia is likely acute blood loss secondary to his gastric cancer  - s/p EGD (9/11) with friable mass noted with partial gastric outlet obstruction  - Recommend giving iron transfusion. We are out of iron sucrose, thus will give ferrous gluconate 125mg daily while admitted.   - please stop oral iron while inpatient.   - IF cleared for oral medications can discharge with PO iron 325mg PO 3x/week  - trend CBC daily  - can send B12 & folate  - keep active T&S    #Nutrition  - as per surgery will plan for TPN  - please trend electrolytes daily  - patient educated on PICC .

## 2024-09-13 NOTE — PROGRESS NOTE ADULT - SUBJECTIVE AND OBJECTIVE BOX
INTERVAL HPI/OVERNIGHT EVENTS: NAEON    SUBJECTIVE: Pt seen and examined by chief resident. Pt is doing well, resting comfortably on bed. Diet tolerated. Ambulating out of bed. +F/+BM. No nausea or vomiting. No complaints at this time.    MEDICATIONS  (STANDING):  chlorhexidine 2% Cloths 1 Application(s) Topical <User Schedule>  ferrous    sulfate 325 milliGRAM(s) Oral daily  influenza   Vaccine 0.5 milliLiter(s) IntraMuscular once  lipid, fat emulsion (Fish Oil and Plant Based) 20% Infusion 0.8612 Gm/kG/Day (20.8 mL/Hr) IV Continuous <Continuous>  pantoprazole  Injectable 40 milliGRAM(s) IV Push every 12 hours  Parenteral Nutrition - Adult 1 Each TPN Continuous <Continuous>  sucralfate suspension 1 Gram(s) Oral every 6 hours    MEDICATIONS  (PRN):  sodium chloride 0.9% lock flush 10 milliLiter(s) IV Push every 1 hour PRN Pre/post blood products, medications, blood draw, and to maintain line patency      Vital Signs Last 24 Hrs  T(C): 36.8 (13 Sep 2024 04:27), Max: 36.8 (12 Sep 2024 17:08)  T(F): 98.3 (13 Sep 2024 04:27), Max: 98.3 (12 Sep 2024 20:25)  HR: 75 (13 Sep 2024 04:27) (65 - 80)  BP: 109/75 (13 Sep 2024 04:27) (107/72 - 137/85)  BP(mean): 84 (12 Sep 2024 23:51) (84 - 84)  RR: 18 (13 Sep 2024 04:27) (17 - 18)  SpO2: 98% (13 Sep 2024 04:27) (98% - 100%)    Parameters below as of 13 Sep 2024 04:27  Patient On (Oxygen Delivery Method): room air        PHYSICAL EXAM:      Constitutional: A&Ox3, nad  Respiratory: non labored breathing, no respiratory distress  Cardiovascular: NSR, RRR  Gastrointestinal: abd soft, NT, ND  Extremities: (-) edema, wwp, SCDs in place                  I&O's Detail    12 Sep 2024 07:01  -  13 Sep 2024 07:00  --------------------------------------------------------  IN:    Fat Emulsion (Fish Oil &amp; Plant Based) 20% Infusion: 145.6 mL    TPN (Total Parenteral Nutrition): 1209 mL  Total IN: 1354.6 mL    OUT:    Voided (mL): 1550 mL  Total OUT: 1550 mL    Total NET: -195.4 mL          LABS:                RADIOLOGY & ADDITIONAL STUDIES:

## 2024-09-13 NOTE — PROGRESS NOTE ADULT - ASSESSMENT
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 Pt was sent here after seeing Dr. Carrasco today in office with complaints of black bowel movement yesterday.     Pureed  PICC/TPN  no AM labs  Dispo: TPN (set up)

## 2024-09-14 LAB
HCT VFR BLD CALC: 30.3 % — LOW (ref 39–50)
HGB BLD-MCNC: 9.2 G/DL — LOW (ref 13–17)
MCHC RBC-ENTMCNC: 28.8 PG — SIGNIFICANT CHANGE UP (ref 27–34)
MCHC RBC-ENTMCNC: 30.4 GM/DL — LOW (ref 32–36)
MCV RBC AUTO: 94.7 FL — SIGNIFICANT CHANGE UP (ref 80–100)
NRBC # BLD: 0 /100 WBCS — SIGNIFICANT CHANGE UP (ref 0–0)
PLATELET # BLD AUTO: 266 K/UL — SIGNIFICANT CHANGE UP (ref 150–400)
RBC # BLD: 3.2 M/UL — LOW (ref 4.2–5.8)
RBC # FLD: 17.9 % — HIGH (ref 10.3–14.5)
WBC # BLD: 5.37 K/UL — SIGNIFICANT CHANGE UP (ref 3.8–10.5)
WBC # FLD AUTO: 5.37 K/UL — SIGNIFICANT CHANGE UP (ref 3.8–10.5)

## 2024-09-14 PROCEDURE — 99232 SBSQ HOSP IP/OBS MODERATE 35: CPT

## 2024-09-14 RX ORDER — I.V. FAT EMULSION 20 G/100ML
0.86 EMULSION INTRAVENOUS
Qty: 50 | Refills: 0 | Status: DISCONTINUED | OUTPATIENT
Start: 2024-09-14 | End: 2024-09-14

## 2024-09-14 RX ORDER — SODIUM FERRIC GLUCONATE COMPLEX IN SUCROSE 12.5 MG/ML
125 INJECTION INTRAVENOUS
Refills: 0 | Status: COMPLETED | OUTPATIENT
Start: 2024-09-14 | End: 2024-09-18

## 2024-09-14 RX ORDER — ELECTROLYTE SOLUTION
1 VIAL (ML) INTRAVENOUS
Refills: 0 | Status: DISCONTINUED | OUTPATIENT
Start: 2024-09-14 | End: 2024-09-14

## 2024-09-14 RX ADMIN — Medication 1 GRAM(S): at 23:19

## 2024-09-14 RX ADMIN — SODIUM FERRIC GLUCONATE COMPLEX IN SUCROSE 110 MILLIGRAM(S): 12.5 INJECTION INTRAVENOUS at 11:51

## 2024-09-14 RX ADMIN — Medication 325 MILLIGRAM(S): at 11:50

## 2024-09-14 RX ADMIN — Medication 1 GRAM(S): at 05:32

## 2024-09-14 RX ADMIN — PANTOPRAZOLE SODIUM 40 MILLIGRAM(S): 40 TABLET, DELAYED RELEASE ORAL at 05:32

## 2024-09-14 RX ADMIN — Medication 1 GRAM(S): at 11:51

## 2024-09-14 RX ADMIN — CHLORHEXIDINE GLUCONATE ORAL RINSE 1 APPLICATION(S): 1.2 SOLUTION DENTAL at 05:31

## 2024-09-14 RX ADMIN — I.V. FAT EMULSION 20.83 GM/KG/DAY: 20 EMULSION INTRAVENOUS at 17:44

## 2024-09-14 RX ADMIN — Medication 1 EACH: at 17:44

## 2024-09-14 RX ADMIN — Medication 1 GRAM(S): at 17:44

## 2024-09-14 RX ADMIN — PANTOPRAZOLE SODIUM 40 MILLIGRAM(S): 40 TABLET, DELAYED RELEASE ORAL at 17:44

## 2024-09-14 NOTE — PROGRESS NOTE ADULT - SUBJECTIVE AND OBJECTIVE BOX
FRANC COLON 49y Male  MRN#: [id number]         SUBJECTIVE  Patient is a 49y old Male who presents with a chief complaint of GI bleed (14 Sep 2024 07:11)    Today is hospital day 4d, and this morning he is sitting in bed comfortably. Reports brown BM yesterday.         OBJECTIVE  PAST MEDICAL & SURGICAL HISTORY  HTN (hypertension)    Malignant neoplasm of stomach    History of prediabetes    History of esophagogastroduodenoscopy (EGD)    Status post laparoscopic procedure      Home Meds:  pantoprazole 40 mg oral delayed release tablet: 1 tab(s) orally once a day (before a meal) MDD: 2  sucralfate 1 g/10 mL oral suspension: 10 milliliter(s) orally 4 times a day    ALLERGIES:  No Known Drug Allergies  shellfish (Anaphylaxis)    MEDICATIONS:  STANDING MEDICATIONS  chlorhexidine 2% Cloths 1 Application(s) Topical <User Schedule>  ferrous    sulfate 325 milliGRAM(s) Oral daily  influenza   Vaccine 0.5 milliLiter(s) IntraMuscular once  lipid, fat emulsion (Fish Oil and Plant Based) 20% Infusion 0.8612 Gm/kG/Day IV Continuous <Continuous>  pantoprazole  Injectable 40 milliGRAM(s) IV Push every 12 hours  Parenteral Nutrition - Adult 1 Each TPN Continuous <Continuous>  sodium ferric gluconate complex IVPB 125 milliGRAM(s) IV Intermittent <User Schedule>  sucralfate suspension 1 Gram(s) Oral every 6 hours    PRN MEDICATIONS  sodium chloride 0.9% lock flush 10 milliLiter(s) IV Push every 1 hour PRN      VITAL SIGNS: Last 24 Hours  T(C): 37 (14 Sep 2024 08:32), Max: 37.1 (13 Sep 2024 20:00)  T(F): 98.6 (14 Sep 2024 08:32), Max: 98.7 (13 Sep 2024 20:00)  HR: 80 (14 Sep 2024 08:32) (77 - 91)  BP: 111/74 (14 Sep 2024 08:32) (101/66 - 123/79)  BP(mean): --  RR: 17 (14 Sep 2024 08:32) (16 - 17)  SpO2: 97% (14 Sep 2024 08:32) (97% - 98%)    LABS:                        9.2    5.37  )-----------( 266      ( 14 Sep 2024 05:30 )             30.3                         RADIOLOGY:      PHYSICAL EXAM:  General: NAD, AAOx3  HEENT: atraumatic, normocephalic  Pulmonary: clear to auscultation bilaterally; No wheeze  Cardiovascular: Regular rate and rhythm; no murmurs, rubs or gallops. Normal S1S2  Gastrointestinal: Soft, nontender, nondistended; bowel sounds present  Musculoskeletal: 2+ peripheral pulses, no clubbing, cyanosis or edema, LUE PICC line in place  Neurology: Pt. alert and oriented, fluent speech, able to move all extremities  Skin: no rashes or lesions

## 2024-09-14 NOTE — PROGRESS NOTE ADULT - SUBJECTIVE AND OBJECTIVE BOX
INTERVAL HPI/OVERNIGHT EVENTS: NAEON    SUBJECTIVE: Pt seen and examined by chief resident. Pt is doing well, resting comfortably on bed. Pain controlled. Diet tolerated. Ambulating out of bed. +F/+BM. No nausea or vomiting. No complaints at this time.    MEDICATIONS  (STANDING):  chlorhexidine 2% Cloths 1 Application(s) Topical <User Schedule>  ferrous    sulfate 325 milliGRAM(s) Oral daily  influenza   Vaccine 0.5 milliLiter(s) IntraMuscular once  lipid, fat emulsion (Fish Oil and Plant Based) 20% Infusion 0.8612 Gm/kG/Day (20.83 mL/Hr) IV Continuous <Continuous>  pantoprazole  Injectable 40 milliGRAM(s) IV Push every 12 hours  Parenteral Nutrition - Adult 1 Each TPN Continuous <Continuous>  sucralfate suspension 1 Gram(s) Oral every 6 hours    MEDICATIONS  (PRN):  sodium chloride 0.9% lock flush 10 milliLiter(s) IV Push every 1 hour PRN Pre/post blood products, medications, blood draw, and to maintain line patency      Vital Signs Last 24 Hrs  T(C): 36.8 (14 Sep 2024 05:28), Max: 37.1 (13 Sep 2024 20:00)  T(F): 98.2 (14 Sep 2024 05:28), Max: 98.7 (13 Sep 2024 20:00)  HR: 78 (14 Sep 2024 05:28) (77 - 91)  BP: 113/72 (14 Sep 2024 05:28) (101/66 - 123/79)  BP(mean): --  RR: 17 (14 Sep 2024 05:28) (16 - 17)  SpO2: 98% (14 Sep 2024 05:28) (97% - 99%)    Parameters below as of 14 Sep 2024 05:28  Patient On (Oxygen Delivery Method): room air        PHYSICAL EXAM:      Constitutional: A&Ox3, nad  Respiratory: non labored breathing, no respiratory distress  Cardiovascular: NSR, RRR  Gastrointestinal:  Extremities: (-) edema, wwp                  I&O's Detail    12 Sep 2024 07:01  -  13 Sep 2024 07:00  --------------------------------------------------------  IN:    Fat Emulsion (Fish Oil &amp; Plant Based) 20% Infusion: 145.6 mL    TPN (Total Parenteral Nutrition): 1209 mL  Total IN: 1354.6 mL    OUT:    Voided (mL): 1550 mL  Total OUT: 1550 mL    Total NET: -195.4 mL      13 Sep 2024 07:01  -  14 Sep 2024 06:23  --------------------------------------------------------  IN:    Fat Emulsion (Fish Oil &amp; Plant Based) 20% Infusion: 291.2 mL    Oral Fluid: 1070 mL    TPN (Total Parenteral Nutrition): 1167 mL  Total IN: 2528.2 mL    OUT:    Voided (mL): 1800 mL  Total OUT: 1800 mL    Total NET: 728.2 mL          LABS:                RADIOLOGY & ADDITIONAL STUDIES: INTERVAL HPI/OVERNIGHT EVENTS: NAEON    SUBJECTIVE: Pt seen and examined by chief resident. Pt is doing well, resting comfortably on bed. Pain controlled. Diet tolerated. Ambulating out of bed. +F/+BM (getting lighter). No nausea or vomiting. No complaints at this time.    MEDICATIONS  (STANDING):  chlorhexidine 2% Cloths 1 Application(s) Topical <User Schedule>  ferrous    sulfate 325 milliGRAM(s) Oral daily  influenza   Vaccine 0.5 milliLiter(s) IntraMuscular once  lipid, fat emulsion (Fish Oil and Plant Based) 20% Infusion 0.8612 Gm/kG/Day (20.83 mL/Hr) IV Continuous <Continuous>  pantoprazole  Injectable 40 milliGRAM(s) IV Push every 12 hours  Parenteral Nutrition - Adult 1 Each TPN Continuous <Continuous>  sucralfate suspension 1 Gram(s) Oral every 6 hours    MEDICATIONS  (PRN):  sodium chloride 0.9% lock flush 10 milliLiter(s) IV Push every 1 hour PRN Pre/post blood products, medications, blood draw, and to maintain line patency      Vital Signs Last 24 Hrs  T(C): 36.8 (14 Sep 2024 05:28), Max: 37.1 (13 Sep 2024 20:00)  T(F): 98.2 (14 Sep 2024 05:28), Max: 98.7 (13 Sep 2024 20:00)  HR: 78 (14 Sep 2024 05:28) (77 - 91)  BP: 113/72 (14 Sep 2024 05:28) (101/66 - 123/79)  BP(mean): --  RR: 17 (14 Sep 2024 05:28) (16 - 17)  SpO2: 98% (14 Sep 2024 05:28) (97% - 99%)    Parameters below as of 14 Sep 2024 05:28  Patient On (Oxygen Delivery Method): room air        PHYSICAL EXAM:      Constitutional: A&Ox3, nad  Respiratory: non labored breathing, no respiratory distress  Cardiovascular: NSR, RRR  Gastrointestinal: abd soft, NT, ND  Extremities: (-) edema, wwp                  I&O's Detail    12 Sep 2024 07:01  -  13 Sep 2024 07:00  --------------------------------------------------------  IN:    Fat Emulsion (Fish Oil &amp; Plant Based) 20% Infusion: 145.6 mL    TPN (Total Parenteral Nutrition): 1209 mL  Total IN: 1354.6 mL    OUT:    Voided (mL): 1550 mL  Total OUT: 1550 mL    Total NET: -195.4 mL      13 Sep 2024 07:01  -  14 Sep 2024 06:23  --------------------------------------------------------  IN:    Fat Emulsion (Fish Oil &amp; Plant Based) 20% Infusion: 291.2 mL    Oral Fluid: 1070 mL    TPN (Total Parenteral Nutrition): 1167 mL  Total IN: 2528.2 mL    OUT:    Voided (mL): 1800 mL  Total OUT: 1800 mL    Total NET: 728.2 mL          LABS:                RADIOLOGY & ADDITIONAL STUDIES:

## 2024-09-14 NOTE — PROGRESS NOTE ADULT - ASSESSMENT
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 Pt was sent here after seeing Dr. Carrasco today in office with complaints of black bowel movement yesterday.     Pureed  PICC/TPN  no AM labs  Dispo: TPN (set up) 48 y/o M with HTN (not on meds) diagnosed with Gastric cancer 7/2024 s/p dx lap w/ peritoneal washings on 7/31, followed by [facility], on neoadjuvant chemo therapy (last session 8/28). Pt was sent here after seeing Dr. Carrasco today in office with complaints of black bowel movement yesterday.     Pureed  PICC/TPN  no AM labs  OR thursday 9/19  Dispo: TPN (set up)

## 2024-09-14 NOTE — PROGRESS NOTE ADULT - ASSESSMENT
50 y/o M with HTN (not on meds) diagnosed with Gastric cancer 7/2024on chemotherapy who was admitted to [facility] for evaluation of his gastric cancer. Medicine consulted for optimization prior to surgery.    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   - His anemia is likely acute blood loss secondary to his gastric cancer  - s/p EGD (9/11) with friable mass noted with partial gastric outlet obstruction  - Recommend giving iron transfusion. We are out of iron sucrose, thus will give ferrous gluconate 125mg daily while admitted.   - please stop oral iron while inpatient.   - IF cleared for oral medications can discharge with PO iron 325mg PO 3x/week  - trend CBC daily  - can send B12 & folate  - keep active T&S    #Nutrition  - as per surgery will plan for TPN  - please trend electrolytes daily  - patient educated on PICC .

## 2024-09-15 PROCEDURE — 99232 SBSQ HOSP IP/OBS MODERATE 35: CPT

## 2024-09-15 RX ORDER — ELECTROLYTE SOLUTION
1 VIAL (ML) INTRAVENOUS
Refills: 0 | Status: DISCONTINUED | OUTPATIENT
Start: 2024-09-15 | End: 2024-09-15

## 2024-09-15 RX ORDER — I.V. FAT EMULSION 20 G/100ML
1.16 EMULSION INTRAVENOUS
Qty: 50 | Refills: 0 | Status: DISCONTINUED | OUTPATIENT
Start: 2024-09-15 | End: 2024-09-15

## 2024-09-15 RX ADMIN — PANTOPRAZOLE SODIUM 40 MILLIGRAM(S): 40 TABLET, DELAYED RELEASE ORAL at 17:55

## 2024-09-15 RX ADMIN — Medication 1 EACH: at 17:55

## 2024-09-15 RX ADMIN — Medication 1 GRAM(S): at 05:57

## 2024-09-15 RX ADMIN — Medication 1 GRAM(S): at 17:54

## 2024-09-15 RX ADMIN — CHLORHEXIDINE GLUCONATE ORAL RINSE 1 APPLICATION(S): 1.2 SOLUTION DENTAL at 06:18

## 2024-09-15 RX ADMIN — PANTOPRAZOLE SODIUM 40 MILLIGRAM(S): 40 TABLET, DELAYED RELEASE ORAL at 05:57

## 2024-09-15 RX ADMIN — Medication 1 GRAM(S): at 11:52

## 2024-09-15 RX ADMIN — SODIUM FERRIC GLUCONATE COMPLEX IN SUCROSE 110 MILLIGRAM(S): 12.5 INJECTION INTRAVENOUS at 11:52

## 2024-09-15 RX ADMIN — I.V. FAT EMULSION 20.83 GM/KG/DAY: 20 EMULSION INTRAVENOUS at 17:55

## 2024-09-15 NOTE — PROGRESS NOTE ADULT - ASSESSMENT
50 y/o M with HTN (not on meds) diagnosed with Gastric cancer 7/2024 s/p dx lap w/ peritoneal washings on 7/31, followed by [facility], on neoadjuvant chemo therapy (last session 8/28). Pt was sent here after seeing Dr. Carrasco today in office with complaints of black bowel movement yesterday.     Pureed  PICC/TPN  no AM labs  OR thursday 9/19  Dispo: TPN (set up)    Plan discussed with attending and chief resident.   ____________________________________________________  Kristan Alvarado - Resident   Surgery

## 2024-09-15 NOTE — PROGRESS NOTE ADULT - SUBJECTIVE AND OBJECTIVE BOX
FRANC GARZA , [id number],  [facility] [id number]    Time of encounter : 9:30 am    he is ambulatory, [employer] [facility]  TPN on going.       T(C): 37.1 (09-15-24 @ 12:22), Max: 37.1 (09-15-24 @ 12:22)  HR: 74 (09-15-24 @ 12:22) (68 - 76)  BP: 113/77 (09-15-24 @ 12:22) (113/77 - 128/82)  RR: 17 (09-15-24 @ 12:22) (17 - 17)  SpO2: 100% (09-15-24 @ 12:22) (97% - 100%)    chlorhexidine 2% Cloths 1 Application(s) Topical <User Schedule>  influenza   Vaccine 0.5 milliLiter(s) IntraMuscular once  lipid, fat emulsion (Fish Oil and Plant Based) 20% Infusion 1.1612 Gm/kG/Day IV Continuous <Continuous>  pantoprazole  Injectable 40 milliGRAM(s) IV Push every 12 hours  Parenteral Nutrition - Adult 1 Each TPN Continuous <Continuous>  Parenteral Nutrition - Adult 1 Each TPN Continuous <Continuous>  sodium chloride 0.9% lock flush 10 milliLiter(s) IV Push every 1 hour PRN  sodium ferric gluconate complex IVPB 125 milliGRAM(s) IV Intermittent <User Schedule>  sucralfate suspension 1 Gram(s) Oral every 6 hours      Physical Exam :    General exam :  well built, not in distress, saturating well on room air.  ambulatory.  no edema noted on lower ext.         CBC Full  -  ( 14 Sep 2024 05:30 )  WBC Count : 5.37 K/uL  RBC Count : 3.20 M/uL  Hemoglobin : 9.2 g/dL  Hematocrit : 30.3 %  Platelet Count - Automated : 266 K/uL  Mean Cell Volume : 94.7 fl  Mean Cell Hemoglobin : 28.8 pg  Mean Cell Hemoglobin Concentration : 30.4 gm/dL  Auto Neutrophil # : x  Auto Lymphocyte # : x  Auto Monocyte # : x  Auto Eosinophil # : x  Auto Basophil # : x  Auto Neutrophil % : x  Auto Lymphocyte % : x  Auto Monocyte % : x  Auto Eosinophil % : x  Auto Basophil % : x              Daily     Daily   CAPILLARY BLOOD GLUCOSE

## 2024-09-15 NOTE — PROGRESS NOTE ADULT - ASSESSMENT
50 y/o M with HTN (was on low dose Losartan previously but was taken off for 3 months) diagnosed with Gastric cancer 7/2024 s/p dx lap w/ peritoneal washings on 7/31, followed by Dr. Pereira, on neoadjuvant chemo therapy (last session 8/28). Patient reports he first was diagnosed with gastric cancer in July 2024 when he a a syncopal event, found to be anemic with an EGD that revealed the mass. He has since had two syncopal events (7/6 & 8/14) secondary to severe anemia; both of which were about 2 weeks after chemotherapy. He endorses approx 30 lbs unintentional weight loss since diagnosis. Most recently on 9/7/24, admitted to OSH for syncope, hgb 6.9 s/p  2 units PRBC transfusion. Pt was sent here after seeing Surgical Oncologist Dr. Carrasco today in office with complaints of black bowel movement the day before-       #Pre-op for distal gastrectomy   - Patient has excellent METS - works as a   - RCRI = 3.9 % 30-day risk of death, MI, or cardiac arrest  - JARAMILLO = 0.0 % Risk of myocardial infarction or cardiac arrest, intraoperatively or up to 30 days post-op  - Patient is low risk for intermediate risk procedure    #BLood loss Anemia/OSIRIS   - Patient with evidence of OSIRIS in July 2024 but labs on this admission are consistent with low-normal iron stores. Tsat 11%  - iron sucrose is out of stock -given ferrous gluconate- 125 per day -with aim for 8 doses total ( started 9/12- ( day 2/8-   would hold oral iron as in-patient, to give on discharge ( iron work best when given three times per week along with stool softener.   - His anemia is likely acute blood loss secondary to his gastric cancer  - no more bleeding noted, hb has been stable around 9   - GI f/u appreciated, EGD (9/11)reviewed 5cm gastric mass at lesser curvature w/ partial GOO, scope cannot to traverse.       #Nutrition/severe protein calorie malnutrition  # weight loss   - PICC line LUE placed by IR (9/10)   - on TPN now.   - puree diet as tolerated.    # incentive spirometer , ambulation as tolerated.     Thank you for having us participating with his care. Medicine will follow pt with you.     48 y/o M with HTN (was on low dose Losartan previously but was taken off for 3 months) diagnosed with Gastric cancer 7/2024 s/p dx lap w/ peritoneal washings on 7/31, followed by Dr. Pereira, on neoadjuvant chemo therapy (last session 8/28). Patient reports he first was diagnosed with gastric cancer in July 2024 when he a a syncopal event, found to be anemic with an EGD that revealed the mass. He has since had two syncopal events (7/6 & 8/14) secondary to severe anemia; both of which were about 2 weeks after chemotherapy. He endorses approx 30 lbs unintentional weight loss since diagnosis. Most recently on 9/7/24, admitted to OSH for syncope, hgb 6.9 s/p  2 units PRBC transfusion. Pt was sent here after seeing Surgical Oncologist Dr. Carrasco today in office with complaints of black bowel movement the day before-       #Pre-op for distal gastrectomy   - Patient has excellent METS - works as a   - RCRI = 3.9 % 30-day risk of death, MI, or cardiac arrest  - JARAMILLO = 0.0 % Risk of myocardial infarction or cardiac arrest, intraoperatively or up to 30 days post-op  - Patient is low risk for intermediate risk procedure    #BLood loss Anemia/OSIRIS   - Patient with evidence of OSIRIS in July 2024 but labs on this admission are consistent with low-normal iron stores. Tsat 11%  - iron sucrose is out of stock -given ferrous gluconate- 125 per day -with aim for 8 doses total ( started 9/12- ( day 2/8-   would hold oral iron as in-patient, to give on discharge ( iron work best when given three times per week along with stool softener.   - His anemia is likely acute blood loss secondary to his gastric cancer  - no more bleeding noted, hb has been stable around 9 - 9.2 on 9/15  - GI f/u appreciated, EGD (9/11)reviewed 5cm gastric mass at lesser curvature w/ partial GOO, scope cannot to traverse.         #Nutrition/severe protein calorie malnutrition  # weight loss   - PICC line LUE placed by IR (9/10)   - on TPN now.   - puree diet as tolerated.    # incentive spirometer , ambulation as tolerated.     Thank you for having us participating with his care. Medicine will follow pt with you.

## 2024-09-15 NOTE — PROGRESS NOTE ADULT - SUBJECTIVE AND OBJECTIVE BOX
SUBJECTIVE: Patient seen and examined bedside by chief resident. Patient reports doing well this morning. Denies nausea, vomiting, CP, SOB      MEDICATIONS  (PRN):  sodium chloride 0.9% lock flush 10 milliLiter(s) IV Push every 1 hour PRN Pre/post blood products, medications, blood draw, and to maintain line patency      I&O's Detail    14 Sep 2024 07:01  -  15 Sep 2024 07:00  --------------------------------------------------------  IN:    Oral Fluid: 260 mL    TPN (Total Parenteral Nutrition): 84 mL  Total IN: 344 mL    OUT:    Fat Emulsion (Fish Oil &amp; Plant Based) 20% Infusion: 0 mL    Voided (mL): 2050 mL  Total OUT: 2050 mL    Total NET: -1706 mL          Vital Signs Last 24 Hrs  T(C): 36.4 (15 Sep 2024 04:56), Max: 37 (14 Sep 2024 08:32)  T(F): 97.5 (15 Sep 2024 04:56), Max: 98.6 (14 Sep 2024 08:32)  HR: 73 (15 Sep 2024 04:56) (68 - 80)  BP: 128/82 (15 Sep 2024 04:56) (111/74 - 128/82)  BP(mean): --  RR: 17 (15 Sep 2024 04:56) (17 - 17)  SpO2: 100% (15 Sep 2024 04:56) (97% - 100%)    Parameters below as of 15 Sep 2024 04:56  Patient On (Oxygen Delivery Method): room air        PHYSICAL EXAM:   Gen: NAD, resting comfortably   CV: NSR  Pulm: no respiratory distress on RA, CTAB   Abd: soft, ND, NTTP, no rebound or guarding   Ext: WWP, no edema, PICC line in place   Neuro: motor/sensory grossly intact     LABS:                        9.2    5.37  )-----------( 266      ( 14 Sep 2024 05:30 )             30.3

## 2024-09-16 DIAGNOSIS — I95.9 HYPOTENSION, UNSPECIFIED: ICD-10-CM

## 2024-09-16 DIAGNOSIS — I10 ESSENTIAL (PRIMARY) HYPERTENSION: ICD-10-CM

## 2024-09-16 DIAGNOSIS — R55 SYNCOPE AND COLLAPSE: ICD-10-CM

## 2024-09-16 DIAGNOSIS — C16.9 MALIGNANT NEOPLASM OF STOMACH, UNSPECIFIED: ICD-10-CM

## 2024-09-16 DIAGNOSIS — D64.9 ANEMIA, UNSPECIFIED: ICD-10-CM

## 2024-09-16 PROCEDURE — 99232 SBSQ HOSP IP/OBS MODERATE 35: CPT

## 2024-09-16 PROCEDURE — 99231 SBSQ HOSP IP/OBS SF/LOW 25: CPT

## 2024-09-16 RX ORDER — ELECTROLYTE SOLUTION
1 VIAL (ML) INTRAVENOUS
Refills: 0 | Status: DISCONTINUED | OUTPATIENT
Start: 2024-09-16 | End: 2024-09-16

## 2024-09-16 RX ORDER — I.V. FAT EMULSION 20 G/100ML
0.86 EMULSION INTRAVENOUS
Qty: 50 | Refills: 0 | Status: DISCONTINUED | OUTPATIENT
Start: 2024-09-16 | End: 2024-09-16

## 2024-09-16 RX ADMIN — SODIUM FERRIC GLUCONATE COMPLEX IN SUCROSE 110 MILLIGRAM(S): 12.5 INJECTION INTRAVENOUS at 12:03

## 2024-09-16 RX ADMIN — Medication 1 GRAM(S): at 00:22

## 2024-09-16 RX ADMIN — PANTOPRAZOLE SODIUM 40 MILLIGRAM(S): 40 TABLET, DELAYED RELEASE ORAL at 17:18

## 2024-09-16 RX ADMIN — CHLORHEXIDINE GLUCONATE ORAL RINSE 1 APPLICATION(S): 1.2 SOLUTION DENTAL at 05:11

## 2024-09-16 RX ADMIN — Medication 1 GRAM(S): at 05:12

## 2024-09-16 RX ADMIN — Medication 1 EACH: at 17:18

## 2024-09-16 RX ADMIN — Medication 1 GRAM(S): at 17:18

## 2024-09-16 RX ADMIN — I.V. FAT EMULSION 20.8 GM/KG/DAY: 20 EMULSION INTRAVENOUS at 17:18

## 2024-09-16 RX ADMIN — PANTOPRAZOLE SODIUM 40 MILLIGRAM(S): 40 TABLET, DELAYED RELEASE ORAL at 05:12

## 2024-09-16 RX ADMIN — Medication 1 GRAM(S): at 12:03

## 2024-09-16 NOTE — PROGRESS NOTE ADULT - SUBJECTIVE AND OBJECTIVE BOX
FRANC COLON 49y Male  MRN#: [id number]         SUBJECTIVE  Patient is a 49y old Male who presents with a chief complaint of GI bleed (16 Sep 2024 09:13)    Today is hospital day 6d, and this morning he is walking up and down the up. He feels well, no overnight events.         OBJECTIVE  PAST MEDICAL & SURGICAL HISTORY  HTN (hypertension)    Malignant neoplasm of stomach    History of prediabetes    History of esophagogastroduodenoscopy (EGD)    Status post laparoscopic procedure      Home Meds:  pantoprazole 40 mg oral delayed release tablet: 1 tab(s) orally once a day (before a meal) MDD: 2  sucralfate 1 g/10 mL oral suspension: 10 milliliter(s) orally 4 times a day    ALLERGIES:  No Known Drug Allergies  shellfish (Anaphylaxis)    MEDICATIONS:  STANDING MEDICATIONS  chlorhexidine 2% Cloths 1 Application(s) Topical <User Schedule>  influenza   Vaccine 0.5 milliLiter(s) IntraMuscular once  lipid, fat emulsion (Fish Oil and Plant Based) 20% Infusion 0.8612 Gm/kG/Day IV Continuous <Continuous>  pantoprazole  Injectable 40 milliGRAM(s) IV Push every 12 hours  Parenteral Nutrition - Adult 1 Each TPN Continuous <Continuous>  Parenteral Nutrition - Adult 1 Each TPN Continuous <Continuous>  sodium ferric gluconate complex IVPB 125 milliGRAM(s) IV Intermittent <User Schedule>  sucralfate suspension 1 Gram(s) Oral every 6 hours    PRN MEDICATIONS  sodium chloride 0.9% lock flush 10 milliLiter(s) IV Push every 1 hour PRN      VITAL SIGNS: Last 24 Hours  T(C): 36.8 (16 Sep 2024 08:23), Max: 37.1 (15 Sep 2024 12:22)  T(F): 98.3 (16 Sep 2024 08:23), Max: 98.7 (15 Sep 2024 12:22)  HR: 76 (16 Sep 2024 08:23) (68 - 86)  BP: 130/76 (16 Sep 2024 08:23) (110/72 - 136/85)  BP(mean): --  RR: 17 (16 Sep 2024 08:23) (17 - 18)  SpO2: 98% (16 Sep 2024 08:23) (96% - 100%)    LABS:                        RADIOLOGY:      PHYSICAL EXAM:  General: NAD, AAOx3  HEENT: atraumatic, normocephalic  Pulmonary: clear to auscultation bilaterally; No wheeze  Cardiovascular: Regular rate and rhythm; no murmurs, rubs or gallops. Normal S1S2  Gastrointestinal: Soft, nontender, nondistended; bowel sounds present  Musculoskeletal: 2+ peripheral pulses, no clubbing, cyanosis or edema, LUE PICC line in place  Neurology: Pt. alert and oriented, fluent speech, able to move all extremities  Skin: no rashes or lesions

## 2024-09-16 NOTE — PROGRESS NOTE ADULT - ASSESSMENT
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 Pt was sent here after seeing Dr. Carrasco in office with complaints of black bowel movements.     Pureed  PICC/TPN  no AM labs  OR thursday 9/19  Dispo: TPN (set up)

## 2024-09-16 NOTE — PROGRESS NOTE ADULT - SUBJECTIVE AND OBJECTIVE BOX
INTERVAL HPI/OVERNIGHT EVENTS: NAEON    SUBJECTIVE: Pt seen and examined by chief resident. Pt is doing well, resting comfortably on bed. Pain controlled. Diet tolerated. Ambulating out of bed. +F/+BM. No nausea or vomiting. No complaints at this time.    MEDICATIONS  (STANDING):  chlorhexidine 2% Cloths 1 Application(s) Topical <User Schedule>  influenza   Vaccine 0.5 milliLiter(s) IntraMuscular once  lipid, fat emulsion (Fish Oil and Plant Based) 20% Infusion 1.1612 Gm/kG/Day (20.83 mL/Hr) IV Continuous <Continuous>  pantoprazole  Injectable 40 milliGRAM(s) IV Push every 12 hours  Parenteral Nutrition - Adult 1 Each TPN Continuous <Continuous>  sodium ferric gluconate complex IVPB 125 milliGRAM(s) IV Intermittent <User Schedule>  sucralfate suspension 1 Gram(s) Oral every 6 hours    MEDICATIONS  (PRN):  sodium chloride 0.9% lock flush 10 milliLiter(s) IV Push every 1 hour PRN Pre/post blood products, medications, blood draw, and to maintain line patency      Vital Signs Last 24 Hrs  T(C): 36.4 (16 Sep 2024 05:10), Max: 37.1 (15 Sep 2024 12:22)  T(F): 97.6 (16 Sep 2024 05:10), Max: 98.7 (15 Sep 2024 12:22)  HR: 86 (16 Sep 2024 05:10) (68 - 86)  BP: 126/84 (16 Sep 2024 05:10) (110/72 - 136/85)  BP(mean): --  RR: 17 (16 Sep 2024 05:10) (17 - 18)  SpO2: 100% (16 Sep 2024 05:10) (96% - 100%)    Parameters below as of 16 Sep 2024 05:10  Patient On (Oxygen Delivery Method): room air        PHYSICAL EXAM:    Constitutional: A&Ox3  Respiratory: non labored breathing, no respiratory distress  Cardiovascular: NSR, RRR  Gastrointestinal: abd soft, NT, ND  Extremities: (-) edema, wwp                  I&O's Detail    14 Sep 2024 07:01  -  15 Sep 2024 07:00  --------------------------------------------------------  IN:    Oral Fluid: 260 mL    TPN (Total Parenteral Nutrition): 84 mL  Total IN: 344 mL    OUT:    Fat Emulsion (Fish Oil &amp; Plant Based) 20% Infusion: 0 mL    Voided (mL): 2050 mL  Total OUT: 2050 mL    Total NET: -1706 mL      15 Sep 2024 07:01  -  16 Sep 2024 06:14  --------------------------------------------------------  IN:    Fat Emulsion (Fish Oil &amp; Plant Based) 20% Infusion: 208 mL    Oral Fluid: 720 mL    TPN (Total Parenteral Nutrition): 1008 mL  Total IN: 1936 mL    OUT:    Voided (mL): 2850 mL  Total OUT: 2850 mL    Total NET: -914 mL          LABS:                RADIOLOGY & ADDITIONAL STUDIES: INTERVAL HPI/OVERNIGHT EVENTS: NAEON    SUBJECTIVE: Pt seen and examined by chief resident. Pt is doing well, resting comfortably on bed. Diet tolerated. Ambulating out of bed. +F/+BM (nonbloody). No nausea or vomiting. No complaints at this time.    MEDICATIONS  (STANDING):  chlorhexidine 2% Cloths 1 Application(s) Topical <User Schedule>  influenza   Vaccine 0.5 milliLiter(s) IntraMuscular once  lipid, fat emulsion (Fish Oil and Plant Based) 20% Infusion 1.1612 Gm/kG/Day (20.83 mL/Hr) IV Continuous <Continuous>  pantoprazole  Injectable 40 milliGRAM(s) IV Push every 12 hours  Parenteral Nutrition - Adult 1 Each TPN Continuous <Continuous>  sodium ferric gluconate complex IVPB 125 milliGRAM(s) IV Intermittent <User Schedule>  sucralfate suspension 1 Gram(s) Oral every 6 hours    MEDICATIONS  (PRN):  sodium chloride 0.9% lock flush 10 milliLiter(s) IV Push every 1 hour PRN Pre/post blood products, medications, blood draw, and to maintain line patency      Vital Signs Last 24 Hrs  T(C): 36.4 (16 Sep 2024 05:10), Max: 37.1 (15 Sep 2024 12:22)  T(F): 97.6 (16 Sep 2024 05:10), Max: 98.7 (15 Sep 2024 12:22)  HR: 86 (16 Sep 2024 05:10) (68 - 86)  BP: 126/84 (16 Sep 2024 05:10) (110/72 - 136/85)  BP(mean): --  RR: 17 (16 Sep 2024 05:10) (17 - 18)  SpO2: 100% (16 Sep 2024 05:10) (96% - 100%)    Parameters below as of 16 Sep 2024 05:10  Patient On (Oxygen Delivery Method): room air        PHYSICAL EXAM:    Constitutional: A&Ox3  Respiratory: non labored breathing, no respiratory distress  Cardiovascular: NSR, RRR  Gastrointestinal: abd soft, NT, ND  Extremities: (-) edema, wwp                  I&O's Detail    14 Sep 2024 07:01  -  15 Sep 2024 07:00  --------------------------------------------------------  IN:    Oral Fluid: 260 mL    TPN (Total Parenteral Nutrition): 84 mL  Total IN: 344 mL    OUT:    Fat Emulsion (Fish Oil &amp; Plant Based) 20% Infusion: 0 mL    Voided (mL): 2050 mL  Total OUT: 2050 mL    Total NET: -1706 mL      15 Sep 2024 07:01  -  16 Sep 2024 06:14  --------------------------------------------------------  IN:    Fat Emulsion (Fish Oil &amp; Plant Based) 20% Infusion: 208 mL    Oral Fluid: 720 mL    TPN (Total Parenteral Nutrition): 1008 mL  Total IN: 1936 mL    OUT:    Voided (mL): 2850 mL  Total OUT: 2850 mL    Total NET: -914 mL          LABS:                RADIOLOGY & ADDITIONAL STUDIES:

## 2024-09-17 LAB
ALBUMIN SERPL ELPH-MCNC: 3.4 G/DL — SIGNIFICANT CHANGE UP (ref 3.3–5)
ALP SERPL-CCNC: 68 U/L — SIGNIFICANT CHANGE UP (ref 40–120)
ALT FLD-CCNC: 12 U/L — SIGNIFICANT CHANGE UP (ref 10–45)
ANION GAP SERPL CALC-SCNC: 7 MMOL/L — SIGNIFICANT CHANGE UP (ref 5–17)
APTT BLD: 36.3 SEC — HIGH (ref 24.5–35.6)
AST SERPL-CCNC: 13 U/L — SIGNIFICANT CHANGE UP (ref 10–40)
BILIRUB DIRECT SERPL-MCNC: <0.2 MG/DL — SIGNIFICANT CHANGE UP (ref 0–0.3)
BILIRUB SERPL-MCNC: 0.2 MG/DL — SIGNIFICANT CHANGE UP (ref 0.2–1.2)
BLD GP AB SCN SERPL QL: NEGATIVE — SIGNIFICANT CHANGE UP
BUN SERPL-MCNC: 9 MG/DL — SIGNIFICANT CHANGE UP (ref 7–23)
CALCIUM SERPL-MCNC: 8.8 MG/DL — SIGNIFICANT CHANGE UP (ref 8.4–10.5)
CHLORIDE SERPL-SCNC: 107 MMOL/L — SIGNIFICANT CHANGE UP (ref 96–108)
CO2 SERPL-SCNC: 29 MMOL/L — SIGNIFICANT CHANGE UP (ref 22–31)
CREAT SERPL-MCNC: 0.7 MG/DL — SIGNIFICANT CHANGE UP (ref 0.5–1.3)
EGFR: 112 ML/MIN/1.73M2 — SIGNIFICANT CHANGE UP
GLUCOSE SERPL-MCNC: 132 MG/DL — HIGH (ref 70–99)
HCT VFR BLD CALC: 32.8 % — LOW (ref 39–50)
HGB BLD-MCNC: 9.9 G/DL — LOW (ref 13–17)
INR BLD: 0.94 — SIGNIFICANT CHANGE UP (ref 0.85–1.18)
MAGNESIUM SERPL-MCNC: 2.2 MG/DL — SIGNIFICANT CHANGE UP (ref 1.6–2.6)
MCHC RBC-ENTMCNC: 28.4 PG — SIGNIFICANT CHANGE UP (ref 27–34)
MCHC RBC-ENTMCNC: 30.2 GM/DL — LOW (ref 32–36)
MCV RBC AUTO: 94 FL — SIGNIFICANT CHANGE UP (ref 80–100)
NRBC # BLD: 0 /100 WBCS — SIGNIFICANT CHANGE UP (ref 0–0)
PHOSPHATE SERPL-MCNC: 3 MG/DL — SIGNIFICANT CHANGE UP (ref 2.5–4.5)
PLATELET # BLD AUTO: 330 K/UL — SIGNIFICANT CHANGE UP (ref 150–400)
POTASSIUM SERPL-MCNC: 4.1 MMOL/L — SIGNIFICANT CHANGE UP (ref 3.5–5.3)
POTASSIUM SERPL-SCNC: 4.1 MMOL/L — SIGNIFICANT CHANGE UP (ref 3.5–5.3)
PROT SERPL-MCNC: 5.7 G/DL — LOW (ref 6–8.3)
PROTHROM AB SERPL-ACNC: 10.7 SEC — SIGNIFICANT CHANGE UP (ref 9.5–13)
RBC # BLD: 3.49 M/UL — LOW (ref 4.2–5.8)
RBC # FLD: 18.4 % — HIGH (ref 10.3–14.5)
RH IG SCN BLD-IMP: POSITIVE — SIGNIFICANT CHANGE UP
SODIUM SERPL-SCNC: 143 MMOL/L — SIGNIFICANT CHANGE UP (ref 135–145)
TRIGL SERPL-MCNC: 58 MG/DL — SIGNIFICANT CHANGE UP
WBC # BLD: 7.71 K/UL — SIGNIFICANT CHANGE UP (ref 3.8–10.5)
WBC # FLD AUTO: 7.71 K/UL — SIGNIFICANT CHANGE UP (ref 3.8–10.5)

## 2024-09-17 PROCEDURE — 99232 SBSQ HOSP IP/OBS MODERATE 35: CPT

## 2024-09-17 PROCEDURE — 99231 SBSQ HOSP IP/OBS SF/LOW 25: CPT

## 2024-09-17 RX ORDER — I.V. FAT EMULSION 20 G/100ML
0.86 EMULSION INTRAVENOUS
Qty: 50 | Refills: 0 | Status: DISCONTINUED | OUTPATIENT
Start: 2024-09-17 | End: 2024-09-17

## 2024-09-17 RX ORDER — ELECTROLYTE SOLUTION
1 VIAL (ML) INTRAVENOUS
Refills: 0 | Status: DISCONTINUED | OUTPATIENT
Start: 2024-09-17 | End: 2024-09-17

## 2024-09-17 RX ADMIN — PANTOPRAZOLE SODIUM 40 MILLIGRAM(S): 40 TABLET, DELAYED RELEASE ORAL at 18:03

## 2024-09-17 RX ADMIN — Medication 1 GRAM(S): at 18:03

## 2024-09-17 RX ADMIN — PANTOPRAZOLE SODIUM 40 MILLIGRAM(S): 40 TABLET, DELAYED RELEASE ORAL at 05:26

## 2024-09-17 RX ADMIN — Medication 1 GRAM(S): at 12:32

## 2024-09-17 RX ADMIN — Medication 1 GRAM(S): at 05:26

## 2024-09-17 RX ADMIN — SODIUM FERRIC GLUCONATE COMPLEX IN SUCROSE 110 MILLIGRAM(S): 12.5 INJECTION INTRAVENOUS at 12:32

## 2024-09-17 RX ADMIN — CHLORHEXIDINE GLUCONATE ORAL RINSE 1 APPLICATION(S): 1.2 SOLUTION DENTAL at 05:58

## 2024-09-17 RX ADMIN — I.V. FAT EMULSION 20.8 GM/KG/DAY: 20 EMULSION INTRAVENOUS at 18:03

## 2024-09-17 RX ADMIN — Medication 1 EACH: at 18:03

## 2024-09-17 RX ADMIN — Medication 1 GRAM(S): at 00:17

## 2024-09-17 NOTE — PROGRESS NOTE ADULT - SUBJECTIVE AND OBJECTIVE BOX
SUBJECTIVE: Pt seen and examined at bedside with chief. Pt denies any complaints. Pain well controlled. Tolerating diet without N/V. Admits to BF     MEDICATIONS  (STANDING):  chlorhexidine 2% Cloths 1 Application(s) Topical <User Schedule>  influenza   Vaccine 0.5 milliLiter(s) IntraMuscular once  lipid, fat emulsion (Fish Oil and Plant Based) 20% Infusion 0.8612 Gm/kG/Day (20.8 mL/Hr) IV Continuous <Continuous>  pantoprazole  Injectable 40 milliGRAM(s) IV Push every 12 hours  Parenteral Nutrition - Adult 1 Each TPN Continuous <Continuous>  sodium ferric gluconate complex IVPB 125 milliGRAM(s) IV Intermittent <User Schedule>  sucralfate suspension 1 Gram(s) Oral every 6 hours    MEDICATIONS  (PRN):  sodium chloride 0.9% lock flush 10 milliLiter(s) IV Push every 1 hour PRN Pre/post blood products, medications, blood draw, and to maintain line patency      Vital Signs Last 24 Hrs  T(C): 36.4 (17 Sep 2024 05:03), Max: 36.9 (16 Sep 2024 16:19)  T(F): 97.6 (17 Sep 2024 05:03), Max: 98.4 (16 Sep 2024 16:19)  HR: 96 (17 Sep 2024 05:03) (73 - 96)  BP: 113/74 (17 Sep 2024 05:03) (103/66 - 130/76)  BP(mean): --  RR: 17 (17 Sep 2024 05:03) (16 - 17)  SpO2: 98% (17 Sep 2024 05:03) (98% - 100%)    Parameters below as of 17 Sep 2024 05:03  Patient On (Oxygen Delivery Method): room air        PHYSICAL EXAM:      Constitutional: A&Ox3    Respiratory: non labored breathing, no respiratory distress    Cardiovascular: NSR, RRR    Gastrointestinal: Soft ND, NT, no rebound or guarding    Genitourinary: Voiding     Extremities: (-) edema                  I&O's Detail    15 Sep 2024 07:01  -  16 Sep 2024 07:00  --------------------------------------------------------  IN:    Fat Emulsion (Fish Oil &amp; Plant Based) 20% Infusion: 208 mL    Oral Fluid: 720 mL    TPN (Total Parenteral Nutrition): 1008 mL  Total IN: 1936 mL    OUT:    Voided (mL): 2850 mL  Total OUT: 2850 mL    Total NET: -914 mL      16 Sep 2024 07:01  -  17 Sep 2024 06:18  --------------------------------------------------------  IN:    Fat Emulsion (Fish Oil &amp; Plant Based) 20% Infusion: 187.2 mL    Oral Fluid: 500 mL    TPN (Total Parenteral Nutrition): 756 mL  Total IN: 1443.2 mL    OUT:    Voided (mL): 2450 mL  Total OUT: 2450 mL    Total NET: -1006.8 mL          LABS:                RADIOLOGY & ADDITIONAL STUDIES:

## 2024-09-17 NOTE — PROGRESS NOTE ADULT - ASSESSMENT
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 Pt was sent here after seeing Dr. Carrasco in office for GI bleed now s/p EGD showing partial GOO due to gastric mass on 9/11    Pureed + ensure high protein  PICC/TPN  OOBA/SCDs  OR thursday 9/19 for gastrectomy

## 2024-09-17 NOTE — PROGRESS NOTE ADULT - SUBJECTIVE AND OBJECTIVE BOX
CC: Patient is a 49y old  Male who presents with a chief complaint of GI bleed    INTERVAL EVENTS: FREDO  SUBJECTIVE / INTERVAL HPI: Patient seen and examined at bedside.   ROS: negative unless otherwise stated above.    VITAL SIGNS:  Vital Signs Last 24 Hrs  T(C): 36.4 (17 Sep 2024 05:03), Max: 36.9 (16 Sep 2024 16:19)  T(F): 97.6 (17 Sep 2024 05:03), Max: 98.4 (16 Sep 2024 16:19)  HR: 96 (17 Sep 2024 05:03) (73 - 96)  BP: 113/74 (17 Sep 2024 05:03) (103/66 - 130/76)  BP(mean): --  RR: 17 (17 Sep 2024 05:03) (16 - 17)  SpO2: 98% (17 Sep 2024 05:03) (98% - 100%)    Parameters below as of 17 Sep 2024 05:03  Patient On (Oxygen Delivery Method): room air          09-16-24 @ 07:01  -  09-17-24 @ 07:00  --------------------------------------------------------  IN: 1443.2 mL / OUT: 2450 mL / NET: -1006.8 mL        PHYSICAL EXAM:    General: NAD  HEENT: MMM  Neck: supple  Cardiovascular: +S1/S2; RRR  Respiratory: CTA B/L; no W/R/R  Gastrointestinal: soft, NT/ND  Extremities: WWP; no edema, clubbing or cyanosis  Vascular: 2+ radial, DP/PT pulses B/L  Neurological: AAOx3; no focal deficits    MEDICATIONS:  MEDICATIONS  (STANDING):  chlorhexidine 2% Cloths 1 Application(s) Topical <User Schedule>  influenza   Vaccine 0.5 milliLiter(s) IntraMuscular once  lipid, fat emulsion (Fish Oil and Plant Based) 20% Infusion 0.8612 Gm/kG/Day (20.8 mL/Hr) IV Continuous <Continuous>  pantoprazole  Injectable 40 milliGRAM(s) IV Push every 12 hours  Parenteral Nutrition - Adult 1 Each TPN Continuous <Continuous>  sodium ferric gluconate complex IVPB 125 milliGRAM(s) IV Intermittent <User Schedule>  sucralfate suspension 1 Gram(s) Oral every 6 hours    MEDICATIONS  (PRN):  sodium chloride 0.9% lock flush 10 milliLiter(s) IV Push every 1 hour PRN Pre/post blood products, medications, blood draw, and to maintain line patency      ALLERGIES:  Allergies    No Known Drug Allergies  shellfish (Anaphylaxis)    Intolerances        LABS:                        9.9    7.71  )-----------( 330      ( 17 Sep 2024 05:30 )             32.8     09-17    143  |  107  |  x   ----------------------------<  x   4.1   |  x   |  x     Phos  3.0     09-17  Mg     2.2     09-17      PT/INR - ( 17 Sep 2024 05:30 )   PT: 10.7 sec;   INR: 0.94          PTT - ( 17 Sep 2024 05:30 )  PTT:36.3 sec    CAPILLARY BLOOD GLUCOSE          RADIOLOGY & ADDITIONAL TESTS: Reviewed. CC: Patient is a 49y old  Male who presents with a chief complaint of GI bleed    INTERVAL EVENTS: FREDO  SUBJECTIVE / INTERVAL HPI: Patient seen and examined at bedside. Pt tolerating diet. Complains of mild abdominal pain with palpation. Also states he has not passed flatus.   ROS: negative unless otherwise stated above.    VITAL SIGNS:  Vital Signs Last 24 Hrs  T(C): 36.4 (17 Sep 2024 05:03), Max: 36.9 (16 Sep 2024 16:19)  T(F): 97.6 (17 Sep 2024 05:03), Max: 98.4 (16 Sep 2024 16:19)  HR: 96 (17 Sep 2024 05:03) (73 - 96)  BP: 113/74 (17 Sep 2024 05:03) (103/66 - 130/76)  BP(mean): --  RR: 17 (17 Sep 2024 05:03) (16 - 17)  SpO2: 98% (17 Sep 2024 05:03) (98% - 100%)    Parameters below as of 17 Sep 2024 05:03  Patient On (Oxygen Delivery Method): room air          09-16-24 @ 07:01  -  09-17-24 @ 07:00  --------------------------------------------------------  IN: 1443.2 mL / OUT: 2450 mL / NET: -1006.8 mL        PHYSICAL EXAM:    General: NAD, + jaundiced   HEENT: MMM  Neck: supple  Cardiovascular: +S1/S2; RRR   Respiratory: CTA B/L; no W/R/R  Gastrointestinal: + Horizontal laparoscopic incisions noted, clean and intact. + Tender to deep palpation of RLQ. + Hypertympanic, decreased bowel sounds   Extremities: WWP; no edema, clubbing or cyanosis  Vascular: 2+ radial, DP/PT pulses B/L  Neurological: AAOx3; no focal deficits    MEDICATIONS:  MEDICATIONS  (STANDING):  chlorhexidine 2% Cloths 1 Application(s) Topical <User Schedule>  influenza   Vaccine 0.5 milliLiter(s) IntraMuscular once  lipid, fat emulsion (Fish Oil and Plant Based) 20% Infusion 0.8612 Gm/kG/Day (20.8 mL/Hr) IV Continuous <Continuous>  pantoprazole  Injectable 40 milliGRAM(s) IV Push every 12 hours  Parenteral Nutrition - Adult 1 Each TPN Continuous <Continuous>  sodium ferric gluconate complex IVPB 125 milliGRAM(s) IV Intermittent <User Schedule>  sucralfate suspension 1 Gram(s) Oral every 6 hours    MEDICATIONS  (PRN):  sodium chloride 0.9% lock flush 10 milliLiter(s) IV Push every 1 hour PRN Pre/post blood products, medications, blood draw, and to maintain line patency      ALLERGIES:  Allergies    No Known Drug Allergies  shellfish (Anaphylaxis)    Intolerances        LABS:                        9.9    7.71  )-----------( 330      ( 17 Sep 2024 05:30 )             32.8     09-17    143  |  107  |  x   ----------------------------<  x   4.1   |  x   |  x     Phos  3.0     09-17  Mg     2.2     09-17      PT/INR - ( 17 Sep 2024 05:30 )   PT: 10.7 sec;   INR: 0.94          PTT - ( 17 Sep 2024 05:30 )  PTT:36.3 sec    CAPILLARY BLOOD GLUCOSE          RADIOLOGY & ADDITIONAL TESTS: Reviewed. CC: Patient is a 49y old  Male who presents with a chief complaint of GI bleed    INTERVAL EVENTS: FREDO  SUBJECTIVE / INTERVAL HPI: Patient seen and examined at bedside. Tolerating TPN. No complaints at this time.   ROS: negative unless otherwise stated above.    VITAL SIGNS:  Vital Signs Last 24 Hrs  T(C): 36.4 (17 Sep 2024 05:03), Max: 36.9 (16 Sep 2024 16:19)  T(F): 97.6 (17 Sep 2024 05:03), Max: 98.4 (16 Sep 2024 16:19)  HR: 96 (17 Sep 2024 05:03) (73 - 96)  BP: 113/74 (17 Sep 2024 05:03) (103/66 - 130/76)  BP(mean): --  RR: 17 (17 Sep 2024 05:03) (16 - 17)  SpO2: 98% (17 Sep 2024 05:03) (98% - 100%)    Parameters below as of 17 Sep 2024 05:03  Patient On (Oxygen Delivery Method): room air          09-16-24 @ 07:01  -  09-17-24 @ 07:00  --------------------------------------------------------  IN: 1443.2 mL / OUT: 2450 mL / NET: -1006.8 mL        PHYSICAL EXAM:  General: NAD, AAOx3  HEENT: atraumatic, normocephalic  Pulmonary: clear to auscultation bilaterally; No wheeze  Cardiovascular: Regular rate and rhythm; no murmurs, rubs or gallops. Normal S1S2  Gastrointestinal: Soft, nontender, nondistended; bowel sounds present  Musculoskeletal: 2+ peripheral pulses, no clubbing, cyanosis or edema, LUE PICC line in place  Neurology: Pt. alert and oriented, fluent speech, able to move all extremities  Skin: no rashes or lesions    MEDICATIONS:  MEDICATIONS  (STANDING):  chlorhexidine 2% Cloths 1 Application(s) Topical <User Schedule>  influenza   Vaccine 0.5 milliLiter(s) IntraMuscular once  lipid, fat emulsion (Fish Oil and Plant Based) 20% Infusion 0.8612 Gm/kG/Day (20.8 mL/Hr) IV Continuous <Continuous>  pantoprazole  Injectable 40 milliGRAM(s) IV Push every 12 hours  Parenteral Nutrition - Adult 1 Each TPN Continuous <Continuous>  sodium ferric gluconate complex IVPB 125 milliGRAM(s) IV Intermittent <User Schedule>  sucralfate suspension 1 Gram(s) Oral every 6 hours    MEDICATIONS  (PRN):  sodium chloride 0.9% lock flush 10 milliLiter(s) IV Push every 1 hour PRN Pre/post blood products, medications, blood draw, and to maintain line patency      ALLERGIES:  Allergies    No Known Drug Allergies  shellfish (Anaphylaxis)    Intolerances        LABS:                        9.9    7.71  )-----------( 330      ( 17 Sep 2024 05:30 )             32.8     09-17    143  |  107  |  x   ----------------------------<  x   4.1   |  x   |  x     Phos  3.0     09-17  Mg     2.2     09-17      PT/INR - ( 17 Sep 2024 05:30 )   PT: 10.7 sec;   INR: 0.94          PTT - ( 17 Sep 2024 05:30 )  PTT:36.3 sec    CAPILLARY BLOOD GLUCOSE          RADIOLOGY & ADDITIONAL TESTS: Reviewed.

## 2024-09-17 NOTE — PROGRESS NOTE ADULT - ASSESSMENT
50 y/o M with HTN (not on meds) diagnosed with Gastric cancer 7/2024on chemotherapy who was admitted to [facility] for evaluation of his gastric cancer. Medicine consulted for optimization prior to surgery.    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   - His anemia is likely acute blood loss secondary to his gastric cancer  - s/p EGD (9/11) with friable mass noted with partial gastric outlet obstruction  - Recommend giving iron transfusion. We are out of iron sucrose, thus will give ferrous gluconate 125mg daily while admitted.   - please stop oral iron while inpatient.   - IF cleared for oral medications can discharge with PO iron 325mg PO 3x/week  - trend CBC daily  - can send B12 & folate  - keep active T&S    #Nutrition  - as per surgery will plan for TPN  - please trend electrolytes daily  - patient educated on PICC .     48 y/o M with HTN (not on meds) diagnosed with Gastric cancer 7/2024on chemotherapy who was admitted to [facility] for evaluation of his gastric cancer. Medicine consulted for optimization prior to surgery.    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 Hgb is 9.9 as of 9/17/2024.   - His anemia is likely acute blood loss secondary to his gastric cancer  - s/p EGD (9/11) with friable mass noted with partial gastric outlet obstruction  - Recommend giving iron transfusion. We are out of iron sucrose, thus will give ferrous gluconate 125mg daily while admitted.   - please stop oral iron while inpatient.   - IF cleared for oral medications can discharge with PO iron 325mg PO 3x/week  - trend CBC daily  - can send B12 & folate  - keep active T&S    #Nutrition  - as per surgery, can c/w TPN  - please trend electrolytes daily  - patient educated on PICC .

## 2024-09-18 PROCEDURE — 99232 SBSQ HOSP IP/OBS MODERATE 35: CPT

## 2024-09-18 PROCEDURE — 99232 SBSQ HOSP IP/OBS MODERATE 35: CPT | Mod: 57

## 2024-09-18 RX ORDER — SODIUM CHLORIDE IRRIG SOLUTION 0.9 %
1000 SOLUTION, IRRIGATION IRRIGATION
Refills: 0 | Status: DISCONTINUED | OUTPATIENT
Start: 2024-09-18 | End: 2024-09-19

## 2024-09-18 RX ORDER — ELECTROLYTE SOLUTION
1 VIAL (ML) INTRAVENOUS
Refills: 0 | Status: DISCONTINUED | OUTPATIENT
Start: 2024-09-18 | End: 2024-09-18

## 2024-09-18 RX ORDER — I.V. FAT EMULSION 20 G/100ML
0.86 EMULSION INTRAVENOUS
Qty: 50 | Refills: 0 | Status: DISCONTINUED | OUTPATIENT
Start: 2024-09-18 | End: 2024-09-18

## 2024-09-18 RX ORDER — SODIUM CHLORIDE IRRIG SOLUTION 0.9 %
1000 SOLUTION, IRRIGATION IRRIGATION
Refills: 0 | Status: DISCONTINUED | OUTPATIENT
Start: 2024-09-18 | End: 2024-09-18

## 2024-09-18 RX ADMIN — PANTOPRAZOLE SODIUM 40 MILLIGRAM(S): 40 TABLET, DELAYED RELEASE ORAL at 06:21

## 2024-09-18 RX ADMIN — Medication 1 GRAM(S): at 06:21

## 2024-09-18 RX ADMIN — SODIUM FERRIC GLUCONATE COMPLEX IN SUCROSE 110 MILLIGRAM(S): 12.5 INJECTION INTRAVENOUS at 12:40

## 2024-09-18 RX ADMIN — I.V. FAT EMULSION 20.83 GM/KG/DAY: 20 EMULSION INTRAVENOUS at 18:26

## 2024-09-18 RX ADMIN — Medication 1 EACH: at 18:26

## 2024-09-18 RX ADMIN — Medication 1 GRAM(S): at 18:26

## 2024-09-18 RX ADMIN — Medication 1 GRAM(S): at 23:15

## 2024-09-18 RX ADMIN — CHLORHEXIDINE GLUCONATE ORAL RINSE 1 APPLICATION(S): 1.2 SOLUTION DENTAL at 06:23

## 2024-09-18 RX ADMIN — PANTOPRAZOLE SODIUM 40 MILLIGRAM(S): 40 TABLET, DELAYED RELEASE ORAL at 18:26

## 2024-09-18 RX ADMIN — Medication 1 GRAM(S): at 00:02

## 2024-09-18 RX ADMIN — Medication 1 GRAM(S): at 12:40

## 2024-09-18 NOTE — PROGRESS NOTE ADULT - ASSESSMENT
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 Pt was sent here after seeing Dr. Carrasco in office for GI bleed now s/p EGD showing partial GOO due to gastric mass on 9/11.    Pureed + ensure high protein, NPO @[facility] for OR tmw for gastrectomy   PICC/TPN  AM labs

## 2024-09-18 NOTE — PROGRESS NOTE ADULT - ASSESSMENT
48 y/o M with HTN (not on meds) diagnosed with Gastric cancer 7/2024on chemotherapy who was admitted to [facility] for evaluation of his gastric cancer. Medicine consulted for optimization prior to surgery.    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 Tsat 11%  - iron sucrose is out of stock -given ferrous gluconate- 125 per day -with aim for 8 doses total ( started 9/12)  would hold oral iron as in-patient, to give on discharge ( iron work best when given three times per week along with stool softener)  - no more bleeding noted, hb has been stable    #Nutrition/severe protein calorie malnutrition  # weight loss   - PICC line LUE placed by IR (9/10)   - on TPN now.     Discussed with Dr. Cowan

## 2024-09-18 NOTE — PROGRESS NOTE ADULT - SUBJECTIVE AND OBJECTIVE BOX
SUBJECTIVE / INTERVAL HPI: Patient seen and examined at bedside. Reports feeling well in good spirits. Walking around, remaining active.    ROS: negative unless otherwise stated above.    VITAL SIGNS:  Vital Signs Last 24 Hrs  T(C): 37 (18 Sep 2024 09:19), Max: 37 (18 Sep 2024 09:19)  T(F): 98.6 (18 Sep 2024 09:19), Max: 98.6 (18 Sep 2024 09:19)  HR: 79 (18 Sep 2024 09:19) (74 - 81)  BP: 118/75 (18 Sep 2024 09:19) (109/70 - 118/75)  BP(mean): 87 (18 Sep 2024 04:39) (87 - 87)  RR: 18 (18 Sep 2024 09:19) (17 - 18)  SpO2: 99% (18 Sep 2024 09:19) (98% - 100%)    Parameters below as of 18 Sep 2024 09:19  Patient On (Oxygen Delivery Method): room air          09-17-24 @ 07:01  -  09-18-24 @ 07:00  --------------------------------------------------------  IN: 2542.8 mL / OUT: 2700 mL / NET: -157.2 mL    09-18-24 @ 07:01  -  09-18-24 @ 12:21  --------------------------------------------------------  IN: 907 mL / OUT: 900 mL / NET: 7 mL        PHYSICAL EXAM:    General: NAD, sitting on chair with legs propped up on bed  HEENT: MMM, glasses  Neck: supple  Cardiovascular: +S1/S2; RRR, no MRG  Respiratory: CTA B/L; no W/R/R, speaking in full sentences  Extremities: warm and dry; no edema, clubbing or cyanosis  Vascular: 2+ radial pulses B/L  psych: calm, appropriate, conversational    MEDICATIONS:  MEDICATIONS  (STANDING):  chlorhexidine 2% Cloths 1 Application(s) Topical <User Schedule>  influenza   Vaccine 0.5 milliLiter(s) IntraMuscular once  lipid, fat emulsion (Fish Oil and Plant Based) 20% Infusion 0.8612 Gm/kG/Day (20.8 mL/Hr) IV Continuous <Continuous>  pantoprazole  Injectable 40 milliGRAM(s) IV Push every 12 hours  Parenteral Nutrition - Adult 1 Each TPN Continuous <Continuous>  Parenteral Nutrition - Adult 1 Each TPN Continuous <Continuous>  sodium ferric gluconate complex IVPB 125 milliGRAM(s) IV Intermittent <User Schedule>  sucralfate suspension 1 Gram(s) Oral every 6 hours    MEDICATIONS  (PRN):  sodium chloride 0.9% lock flush 10 milliLiter(s) IV Push every 1 hour PRN Pre/post blood products, medications, blood draw, and to maintain line patency      ALLERGIES:  Allergies    No Known Drug Allergies  shellfish (Anaphylaxis)    Intolerances        LABS:                        9.9    7.71  )-----------( 330      ( 17 Sep 2024 05:30 )             32.8     09-17    143  |  107  |  9   ----------------------------<  132[H]  4.1   |  29  |  0.70    Ca    8.8      17 Sep 2024 05:30  Phos  3.0     09-17  Mg     2.2     09-17    TPro  5.7[L]  /  Alb  3.4  /  TBili  0.2  /  DBili  <0.2  /  AST  13  /  ALT  12  /  AlkPhos  68  09-17    PT/INR - ( 17 Sep 2024 05:30 )   PT: 10.7 sec;   INR: 0.94          PTT - ( 17 Sep 2024 05:30 )  PTT:36.3 sec  Urinalysis Basic - ( 17 Sep 2024 05:30 )    Color: x / Appearance: x / SG: x / pH: x  Gluc: 132 mg/dL / Ketone: x  / Bili: x / Urobili: x   Blood: x / Protein: x / Nitrite: x   Leuk Esterase: x / RBC: x / WBC x   Sq Epi: x / Non Sq Epi: x / Bacteria: x      CAPILLARY BLOOD GLUCOSE          RADIOLOGY & ADDITIONAL TESTS: Reviewed.

## 2024-09-18 NOTE — PROGRESS NOTE ADULT - SUBJECTIVE AND OBJECTIVE BOX
SUBJECTIVE: Pt seen and examined at bedside with chief. Pt denies any complaints. Pain well controlled. Tolerating diet without N/V. Admits to BF       MEDICATIONS  (STANDING):  chlorhexidine 2% Cloths 1 Application(s) Topical <User Schedule>  influenza   Vaccine 0.5 milliLiter(s) IntraMuscular once  lipid, fat emulsion (Fish Oil and Plant Based) 20% Infusion 0.8612 Gm/kG/Day (20.8 mL/Hr) IV Continuous <Continuous>  pantoprazole  Injectable 40 milliGRAM(s) IV Push every 12 hours  Parenteral Nutrition - Adult 1 Each TPN Continuous <Continuous>  sodium ferric gluconate complex IVPB 125 milliGRAM(s) IV Intermittent <User Schedule>  sucralfate suspension 1 Gram(s) Oral every 6 hours    MEDICATIONS  (PRN):  sodium chloride 0.9% lock flush 10 milliLiter(s) IV Push every 1 hour PRN Pre/post blood products, medications, blood draw, and to maintain line patency      Vital Signs Last 24 Hrs  T(C): 36.8 (18 Sep 2024 04:39), Max: 36.8 (18 Sep 2024 04:39)  T(F): 98.3 (18 Sep 2024 04:39), Max: 98.3 (18 Sep 2024 04:39)  HR: 76 (18 Sep 2024 04:39) (74 - 81)  BP: 115/72 (18 Sep 2024 04:39) (109/70 - 116/72)  BP(mean): 87 (18 Sep 2024 04:39) (87 - 87)  RR: 17 (18 Sep 2024 04:39) (17 - 18)  SpO2: 98% (18 Sep 2024 04:39) (98% - 100%)    Parameters below as of 18 Sep 2024 04:39  Patient On (Oxygen Delivery Method): room air        PHYSICAL EXAM:      Constitutional: A&Ox3    Respiratory: non labored breathing, no respiratory distress    Cardiovascular: NSR, RRR    Gastrointestinal: soft ND, NT    Genitourinary: Voiding     Extremities: (-) edema                  I&O's Detail    17 Sep 2024 07:01  -  18 Sep 2024 07:00  --------------------------------------------------------  IN:    Fat Emulsion (Fish Oil &amp; Plant Based) 20% Infusion: 228.8 mL    Oral Fluid: 1390 mL    TPN (Total Parenteral Nutrition): 924 mL  Total IN: 2542.8 mL    OUT:    Voided (mL): 2700 mL  Total OUT: 2700 mL    Total NET: -157.2 mL          LABS:                        9.9    7.71  )-----------( 330      ( 17 Sep 2024 05:30 )             32.8     09-17    143  |  107  |  9   ----------------------------<  132[H]  4.1   |  29  |  0.70    Ca    8.8      17 Sep 2024 05:30  Phos  3.0     09-17  Mg     2.2     09-17    TPro  5.7[L]  /  Alb  3.4  /  TBili  0.2  /  DBili  <0.2  /  AST  13  /  ALT  12  /  AlkPhos  68  09-17    PT/INR - ( 17 Sep 2024 05:30 )   PT: 10.7 sec;   INR: 0.94          PTT - ( 17 Sep 2024 05:30 )  PTT:36.3 sec  Urinalysis Basic - ( 17 Sep 2024 05:30 )    Color: x / Appearance: x / SG: x / pH: x  Gluc: 132 mg/dL / Ketone: x  / Bili: x / Urobili: x   Blood: x / Protein: x / Nitrite: x   Leuk Esterase: x / RBC: x / WBC x   Sq Epi: x / Non Sq Epi: x / Bacteria: x        RADIOLOGY & ADDITIONAL STUDIES:

## 2024-09-19 ENCOUNTER — RESULT REVIEW (OUTPATIENT)
Age: 49
End: 2024-09-19

## 2024-09-19 ENCOUNTER — APPOINTMENT (OUTPATIENT)
Dept: SURGICAL ONCOLOGY | Facility: HOSPITAL | Age: 49
End: 2024-09-19

## 2024-09-19 LAB
BUN SERPL-MCNC: 13 MG/DL — SIGNIFICANT CHANGE UP (ref 7–23)
CALCIUM SERPL-MCNC: 8.7 MG/DL — SIGNIFICANT CHANGE UP (ref 8.4–10.5)
CHLORIDE SERPL-SCNC: 103 MMOL/L — SIGNIFICANT CHANGE UP (ref 96–108)
CO2 SERPL-SCNC: 27 MMOL/L — SIGNIFICANT CHANGE UP (ref 22–31)
CREAT SERPL-MCNC: 0.76 MG/DL — SIGNIFICANT CHANGE UP (ref 0.5–1.3)
EGFR: 109 ML/MIN/1.73M2 — SIGNIFICANT CHANGE UP
GLUCOSE SERPL-MCNC: 286 MG/DL — HIGH (ref 70–99)
HCT VFR BLD CALC: 32 % — LOW (ref 39–50)
HGB BLD-MCNC: 9.7 G/DL — LOW (ref 13–17)
MCHC RBC-ENTMCNC: 28.9 PG — SIGNIFICANT CHANGE UP (ref 27–34)
MCHC RBC-ENTMCNC: 30.3 GM/DL — LOW (ref 32–36)
MCV RBC AUTO: 95.2 FL — SIGNIFICANT CHANGE UP (ref 80–100)
NRBC # BLD: 0 /100 WBCS — SIGNIFICANT CHANGE UP (ref 0–0)
PLATELET # BLD AUTO: 329 K/UL — SIGNIFICANT CHANGE UP (ref 150–400)
POTASSIUM SERPL-MCNC: 4.5 MMOL/L — SIGNIFICANT CHANGE UP (ref 3.5–5.3)
POTASSIUM SERPL-SCNC: 4.5 MMOL/L — SIGNIFICANT CHANGE UP (ref 3.5–5.3)
RBC # BLD: 3.36 M/UL — LOW (ref 4.2–5.8)
RBC # FLD: 18.7 % — HIGH (ref 10.3–14.5)
SODIUM SERPL-SCNC: 137 MMOL/L — SIGNIFICANT CHANGE UP (ref 135–145)
WBC # BLD: 18.98 K/UL — HIGH (ref 3.8–10.5)
WBC # FLD AUTO: 18.98 K/UL — HIGH (ref 3.8–10.5)

## 2024-09-19 PROCEDURE — 38589 UNLISTED LAPS PX LYMPHTC SYS: CPT | Mod: AS

## 2024-09-19 PROCEDURE — 43659 UNLISTED LAPS PX STOMACH: CPT | Mod: AS

## 2024-09-19 PROCEDURE — 43659 UNLISTED LAPS PX STOMACH: CPT

## 2024-09-19 PROCEDURE — 38589 UNLISTED LAPS PX LYMPHTC SYS: CPT

## 2024-09-19 PROCEDURE — 88309 TISSUE EXAM BY PATHOLOGIST: CPT | Mod: 26

## 2024-09-19 PROCEDURE — 88305 TISSUE EXAM BY PATHOLOGIST: CPT | Mod: 26

## 2024-09-19 PROCEDURE — 88342 IMHCHEM/IMCYTCHM 1ST ANTB: CPT | Mod: 26

## 2024-09-19 PROCEDURE — S2900 ROBOTIC SURGICAL SYSTEM: CPT | Mod: NC

## 2024-09-19 DEVICE — STAPLER COVIDIEN TRI-STAPLE 60MM PURPLE RELOAD: Type: IMPLANTABLE DEVICE | Status: FUNCTIONAL

## 2024-09-19 DEVICE — LIGATING CLIPS WECK HEMOLOK POLYMER MEDIUM-LARGE (GREEN) 6: Type: IMPLANTABLE DEVICE | Status: FUNCTIONAL

## 2024-09-19 RX ORDER — HYDROMORPHONE HYDROCHLORIDE 1 MG/ML
30 INJECTION, SOLUTION INTRAMUSCULAR; INTRAVENOUS; SUBCUTANEOUS
Refills: 0 | Status: DISCONTINUED | OUTPATIENT
Start: 2024-09-19 | End: 2024-09-20

## 2024-09-19 RX ORDER — NALOXONE HYDROCHLORIDE 0.4 MG/ML
0.1 INJECTION, SOLUTION INTRAMUSCULAR; INTRAVENOUS; SUBCUTANEOUS
Refills: 0 | Status: DISCONTINUED | OUTPATIENT
Start: 2024-09-19 | End: 2024-09-24

## 2024-09-19 RX ORDER — ONDANSETRON HCL/PF 4 MG/2 ML
4 VIAL (ML) INJECTION EVERY 6 HOURS
Refills: 0 | Status: DISCONTINUED | OUTPATIENT
Start: 2024-09-19 | End: 2024-09-24

## 2024-09-19 RX ORDER — SODIUM CHLORIDE IRRIG SOLUTION 0.9 %
1000 SOLUTION, IRRIGATION IRRIGATION
Refills: 0 | Status: DISCONTINUED | OUTPATIENT
Start: 2024-09-19 | End: 2024-09-23

## 2024-09-19 RX ORDER — PANTOPRAZOLE SODIUM 40 MG/1
40 TABLET, DELAYED RELEASE ORAL DAILY
Refills: 0 | Status: DISCONTINUED | OUTPATIENT
Start: 2024-09-19 | End: 2024-09-20

## 2024-09-19 RX ORDER — ACETAMINOPHEN 325 MG
1000 TABLET ORAL EVERY 6 HOURS
Refills: 0 | Status: COMPLETED | OUTPATIENT
Start: 2024-09-19 | End: 2024-09-20

## 2024-09-19 RX ORDER — ELECTROLYTE SOLUTION
1 VIAL (ML) INTRAVENOUS
Refills: 0 | Status: DISCONTINUED | OUTPATIENT
Start: 2024-09-19 | End: 2024-09-19

## 2024-09-19 RX ORDER — I.V. FAT EMULSION 20 G/100ML
0.86 EMULSION INTRAVENOUS
Qty: 50 | Refills: 0 | Status: DISCONTINUED | OUTPATIENT
Start: 2024-09-19 | End: 2024-09-19

## 2024-09-19 RX ADMIN — Medication 400 MILLIGRAM(S): at 17:48

## 2024-09-19 RX ADMIN — I.V. FAT EMULSION 20.83 GM/KG/DAY: 20 EMULSION INTRAVENOUS at 17:53

## 2024-09-19 RX ADMIN — Medication 1 SPRAY(S): at 20:59

## 2024-09-19 RX ADMIN — Medication 100 MILLILITER(S): at 00:03

## 2024-09-19 RX ADMIN — PANTOPRAZOLE SODIUM 40 MILLIGRAM(S): 40 TABLET, DELAYED RELEASE ORAL at 06:16

## 2024-09-19 RX ADMIN — Medication 1 EACH: at 17:49

## 2024-09-19 RX ADMIN — Medication 1 GRAM(S): at 06:15

## 2024-09-19 RX ADMIN — HYDROMORPHONE HYDROCHLORIDE 30 MILLILITER(S): 1 INJECTION, SOLUTION INTRAMUSCULAR; INTRAVENOUS; SUBCUTANEOUS at 12:55

## 2024-09-19 NOTE — PRE-ANESTHESIA EVALUATION ADULT - NSANTHNECKRD_ENT_A_CORE
Procedure:     [x] Transforaminal Epidural Steroid    Post Procedure Instructions:   Activity:  • Activity as tolerated.  • Resume your pre-procedure activity or restrictions tomorrow.    Dressing/Incision Site:   • Keep injection site clean and dry.  • You may shower/bathe tomorrow.  • You may use an ice pack at the injection site for the next 24 hours while awake.  The ice pack should only be used for 20 minutes every 2 hours.    Special Instructions:   • Resume your pre-procedure diet.  • Continue your medications unless otherwise instructed.  · You will most likely have continued pain after the procedure; continue your usual pain medications unless otherwise instructed.  · [x] For Diabetic Steroid Injection Patients:  Diabetic patients may experience higher glucose levels after a steroid injection.  Diabetic patients should monitor their blood sugar for at least one week after injection.  Insulin or medication adjustment may be necessary.  Please contact your primary care provider with any questions/concerns.  ·   Call [phone] if you develop any of the following:  • Drainage, increase in redness, and/or swelling from the injection site.  • Temperature above 101oF.  • Increased numbness or weakness of your legs or arms different than before the injection.                      
No
No

## 2024-09-19 NOTE — PROGRESS NOTE ADULT - SUBJECTIVE AND OBJECTIVE BOX
INTERVAL HPI/OVERNIGHT EVENTS: NAEON    STATUS POST:  9/11: EGD showing partial GOO    SUBJECTIVE: Pt seen and examined by chief resident. Pt is doing well, resting comfortably on bed. Pain controlled. Diet tolerated. Ambulating out of bed. +F/+BM. No nausea or vomiting. No complaints at this time.    MEDICATIONS  (STANDING):  chlorhexidine 2% Cloths 1 Application(s) Topical <User Schedule>  influenza   Vaccine 0.5 milliLiter(s) IntraMuscular once  lactated ringers. 1000 milliLiter(s) (100 mL/Hr) IV Continuous <Continuous>  lipid, fat emulsion (Fish Oil and Plant Based) 20% Infusion 0.8612 Gm/kG/Day (20.83 mL/Hr) IV Continuous <Continuous>  pantoprazole  Injectable 40 milliGRAM(s) IV Push every 12 hours  Parenteral Nutrition - Adult 1 Each TPN Continuous <Continuous>  sucralfate suspension 1 Gram(s) Oral every 6 hours    MEDICATIONS  (PRN):  sodium chloride 0.9% lock flush 10 milliLiter(s) IV Push every 1 hour PRN Pre/post blood products, medications, blood draw, and to maintain line patency      Vital Signs Last 24 Hrs  T(C): 36.9 (19 Sep 2024 04:25), Max: 37.1 (18 Sep 2024 20:50)  T(F): 98.4 (19 Sep 2024 04:25), Max: 98.8 (18 Sep 2024 20:50)  HR: 82 (19 Sep 2024 04:25) (79 - 88)  BP: 111/73 (19 Sep 2024 04:25) (111/73 - 130/87)  BP(mean): --  RR: 18 (19 Sep 2024 04:25) (18 - 18)  SpO2: 97% (19 Sep 2024 04:25) (97% - 100%)    Parameters below as of 19 Sep 2024 04:25  Patient On (Oxygen Delivery Method): room air        PHYSICAL EXAM:      Constitutional: A&Ox3, nad  Respiratory: non labored breathing, no respiratory distress  Cardiovascular: NSR, RRR  Gastrointestinal: abd soft, NT, ND  Extremities: (-) edema, wwp, SCDs in place                  I&O's Detail    17 Sep 2024 07:01  -  18 Sep 2024 07:00  --------------------------------------------------------  IN:    Fat Emulsion (Fish Oil &amp; Plant Based) 20% Infusion: 228.8 mL    Oral Fluid: 1390 mL    TPN (Total Parenteral Nutrition): 924 mL  Total IN: 2542.8 mL    OUT:    Voided (mL): 2700 mL  Total OUT: 2700 mL    Total NET: -157.2 mL      18 Sep 2024 07:01  -  19 Sep 2024 06:18  --------------------------------------------------------  IN:    Fat Emulsion (Fish Oil &amp; Plant Based) 20% Infusion: 249.6 mL    Lactated Ringers: 600 mL    Oral Fluid: 1220 mL    TPN (Total Parenteral Nutrition): 1326 mL  Total IN: 3395.6 mL    OUT:    Voided (mL): 2550 mL  Total OUT: 2550 mL    Total NET: 845.6 mL          LABS:                RADIOLOGY & ADDITIONAL STUDIES: INTERVAL HPI/OVERNIGHT EVENTS: NAEON    STATUS POST:  9/11: EGD showing partial GOO    SUBJECTIVE: Pt seen and examined by chief resident. Pt is doing well, resting comfortably on bed. Diet tolerated. No nausea or vomiting. No complaints at this time.    MEDICATIONS  (STANDING):  chlorhexidine 2% Cloths 1 Application(s) Topical <User Schedule>  influenza   Vaccine 0.5 milliLiter(s) IntraMuscular once  lactated ringers. 1000 milliLiter(s) (100 mL/Hr) IV Continuous <Continuous>  lipid, fat emulsion (Fish Oil and Plant Based) 20% Infusion 0.8612 Gm/kG/Day (20.83 mL/Hr) IV Continuous <Continuous>  pantoprazole  Injectable 40 milliGRAM(s) IV Push every 12 hours  Parenteral Nutrition - Adult 1 Each TPN Continuous <Continuous>  sucralfate suspension 1 Gram(s) Oral every 6 hours    MEDICATIONS  (PRN):  sodium chloride 0.9% lock flush 10 milliLiter(s) IV Push every 1 hour PRN Pre/post blood products, medications, blood draw, and to maintain line patency      Vital Signs Last 24 Hrs  T(C): 36.9 (19 Sep 2024 04:25), Max: 37.1 (18 Sep 2024 20:50)  T(F): 98.4 (19 Sep 2024 04:25), Max: 98.8 (18 Sep 2024 20:50)  HR: 82 (19 Sep 2024 04:25) (79 - 88)  BP: 111/73 (19 Sep 2024 04:25) (111/73 - 130/87)  BP(mean): --  RR: 18 (19 Sep 2024 04:25) (18 - 18)  SpO2: 97% (19 Sep 2024 04:25) (97% - 100%)    Parameters below as of 19 Sep 2024 04:25  Patient On (Oxygen Delivery Method): room air        PHYSICAL EXAM:      Constitutional: A&Ox3, nad  Respiratory: non labored breathing, no respiratory distress  Cardiovascular: NSR, RRR  Gastrointestinal: abd soft, NT, ND  Extremities: (-) edema, wwp, SCDs in place                  I&O's Detail    17 Sep 2024 07:01  -  18 Sep 2024 07:00  --------------------------------------------------------  IN:    Fat Emulsion (Fish Oil &amp; Plant Based) 20% Infusion: 228.8 mL    Oral Fluid: 1390 mL    TPN (Total Parenteral Nutrition): 924 mL  Total IN: 2542.8 mL    OUT:    Voided (mL): 2700 mL  Total OUT: 2700 mL    Total NET: -157.2 mL      18 Sep 2024 07:01  -  19 Sep 2024 06:18  --------------------------------------------------------  IN:    Fat Emulsion (Fish Oil &amp; Plant Based) 20% Infusion: 249.6 mL    Lactated Ringers: 600 mL    Oral Fluid: 1220 mL    TPN (Total Parenteral Nutrition): 1326 mL  Total IN: 3395.6 mL    OUT:    Voided (mL): 2550 mL  Total OUT: 2550 mL    Total NET: 845.6 mL          LABS:                RADIOLOGY & ADDITIONAL STUDIES:

## 2024-09-19 NOTE — PROGRESS NOTE ADULT - ASSESSMENT
50 y/o M with HTN (not on meds) diagnosed with Gastric cancer 7/2024 s/p dx lap w/ peritoneal washings on 7/31, followed by [facility], on neoadjuvant chemo therapy (last session 8/28). Pt was sent here after seeing Dr. Carrasco in office for GI bleed now s/p EGD showing partial GOO due to gastric mass on 9/11.    Pureed + ensure high protein/NPO/IVF @[facility]  PICC/TPN  no AM labs  OR today 9/19  Dispo: TPN 48 y/o M with HTN (not on meds) diagnosed with Gastric cancer 7/2024 s/p dx lap w/ peritoneal washings on 7/31, followed by [facility], on neoadjuvant chemo therapy (last session 8/28). Pt was sent here after seeing Dr. Carrasco in office for GI bleed now s/p EGD showing partial GOO due to gastric mass on 9/11.    Pureed + ensure high protein/NPO/IVF @[facility]  PICC/TPN  AM labs  OR today 9/19  Dispo: TPN

## 2024-09-19 NOTE — BRIEF OPERATIVE NOTE - OPERATION/FINDINGS
Abdomen entered via Dukes cutdown and robotic ports placed under direct visualization. Tumor identified in the lesser curve, no evidence of gross metastatic disease. Distal stomach freed from the omentum along the greater curve until about the level of the short gastrics. Right gastroepiploic and right gastric taken with hemoclips and vessel sealer. Distal margin taken at the first portion of duodenum. D2 lymphadenectomy performed. Portion of jejunum distal to ligament of Treitz marked to be brought up for Bilroth 2 reconstruction. Stomach extracorporealized through small upper midline laparotomy and proximal margin taken roughly 5cm distal to the tumor. Handsewn gastrojejunostomy performed in 2 layers. Hemostasis achieved and DANETTE left in the abdomen near the GJ. Midline fascia closed with running looped PDS and 12mm port site closed with 0-Vicryl figure of eight.      Abdomen entered via Dukes cutdown and robotic ports placed under direct visualization. Tumor identified in the lesser curve, no evidence of gross metastatic disease. Distal stomach freed from the omentum along the greater curve until about the level of the short gastrics. Right gastroepiploic and right gastric taken with hemoclips and vessel sealer. Distal margin taken at the first portion of duodenum. D2 lymphadenectomy performed. Portion of jejunum distal to ligament of Treitz marked to be brought up for Bilroth 2 reconstruction. Stomach extracorporealized through 6 cm subxiphoid incision. proximal margin taken roughly 5cm proximal to the tumor. Handsewn gastrojejunostomy performed in 2 layers. Hemostasis achieved and DANETTE left in the abdomen near the GJ. Extraction site closed with running looped PDS and 12mm port site closed with 0-Vicryl figure of eight.

## 2024-09-19 NOTE — PRE-ANESTHESIA EVALUATION ADULT - NSANTHPEFT_GEN_ALL_CORE
WD, THIN MALE  NAD  BBS =  S1S2
Constitutional: Alert and in no acute distress.  Mouth: Mouth opening within normal limits.   Neck: The appearance of the neck was normal, no neck mass was observed. Thyromental distance within normal limits. Range of motion of neck is within normal limits.  Respiratory: Unlabored breathing.  Cardiovascular:  Rate and rhythm evaluated.  Neurological: No focal deficit, moves all extremities.  Psychiatric: Oriented to person, place, and time, insight and judgment were intact and the affect was normal.

## 2024-09-19 NOTE — BRIEF OPERATIVE NOTE - NSICDXBRIEFPROCEDURE_GEN_ALL_CORE_FT
PROCEDURES:  Gastrectomy, distal, robot-assisted, laparoscopic, with gastrojejunostomy creation 19-Sep-2024 12:55:00  Leigh Ann Stern

## 2024-09-19 NOTE — PRE-ANESTHESIA EVALUATION ADULT - NSANTHOSAYNRD_GEN_A_CORE
No. JOANN screening performed.  STOP BANG Legend: 0-2 = LOW Risk; 3-4 = INTERMEDIATE Risk; 5-8 = HIGH Risk
No. JOANN screening performed.  STOP BANG Legend: 0-2 = LOW Risk; 3-4 = INTERMEDIATE Risk; 5-8 = HIGH Risk

## 2024-09-20 LAB
ANION GAP SERPL CALC-SCNC: 8 MMOL/L — SIGNIFICANT CHANGE UP (ref 5–17)
ANISOCYTOSIS BLD QL: SLIGHT — SIGNIFICANT CHANGE UP
BASOPHILS # BLD AUTO: 0 K/UL — SIGNIFICANT CHANGE UP (ref 0–0.2)
BASOPHILS NFR BLD AUTO: 0 % — SIGNIFICANT CHANGE UP (ref 0–2)
BLASTS # FLD: 0.9 % — HIGH (ref 0–0)
BUN SERPL-MCNC: 12 MG/DL — SIGNIFICANT CHANGE UP (ref 7–23)
CALCIUM SERPL-MCNC: 8.7 MG/DL — SIGNIFICANT CHANGE UP (ref 8.4–10.5)
CHLORIDE SERPL-SCNC: 104 MMOL/L — SIGNIFICANT CHANGE UP (ref 96–108)
CO2 SERPL-SCNC: 29 MMOL/L — SIGNIFICANT CHANGE UP (ref 22–31)
CREAT SERPL-MCNC: 0.61 MG/DL — SIGNIFICANT CHANGE UP (ref 0.5–1.3)
DACRYOCYTES BLD QL SMEAR: SLIGHT — SIGNIFICANT CHANGE UP
EGFR: 116 ML/MIN/1.73M2 — SIGNIFICANT CHANGE UP
EOSINOPHIL # BLD AUTO: 0 K/UL — SIGNIFICANT CHANGE UP (ref 0–0.5)
EOSINOPHIL NFR BLD AUTO: 0 % — SIGNIFICANT CHANGE UP (ref 0–6)
GIANT PLATELETS BLD QL SMEAR: PRESENT — SIGNIFICANT CHANGE UP
GLUCOSE BLDC GLUCOMTR-MCNC: 109 MG/DL — HIGH (ref 70–99)
GLUCOSE SERPL-MCNC: 245 MG/DL — HIGH (ref 70–99)
HCT VFR BLD CALC: 33.5 % — LOW (ref 39–50)
HGB BLD-MCNC: 10 G/DL — LOW (ref 13–17)
HYPOCHROMIA BLD QL: SLIGHT — SIGNIFICANT CHANGE UP
LYMPHOCYTES # BLD AUTO: 0.73 K/UL — LOW (ref 1–3.3)
LYMPHOCYTES # BLD AUTO: 4.3 % — LOW (ref 13–44)
MAGNESIUM SERPL-MCNC: 1.9 MG/DL — SIGNIFICANT CHANGE UP (ref 1.6–2.6)
MANUAL DIF COMMENT BLD-IMP: SIGNIFICANT CHANGE UP
MANUAL SMEAR VERIFICATION: SIGNIFICANT CHANGE UP
MCHC RBC-ENTMCNC: 28.6 PG — SIGNIFICANT CHANGE UP (ref 27–34)
MCHC RBC-ENTMCNC: 29.9 GM/DL — LOW (ref 32–36)
MCV RBC AUTO: 95.7 FL — SIGNIFICANT CHANGE UP (ref 80–100)
MICROCYTES BLD QL: SLIGHT — SIGNIFICANT CHANGE UP
MONOCYTES # BLD AUTO: 0.6 K/UL — SIGNIFICANT CHANGE UP (ref 0–0.9)
MONOCYTES NFR BLD AUTO: 3.5 % — SIGNIFICANT CHANGE UP (ref 2–14)
NEUTROPHILS # BLD AUTO: 15.55 K/UL — HIGH (ref 1.8–7.4)
NEUTROPHILS NFR BLD AUTO: 91.3 % — HIGH (ref 43–77)
OVALOCYTES BLD QL SMEAR: SLIGHT — SIGNIFICANT CHANGE UP
PHOSPHATE SERPL-MCNC: 2.8 MG/DL — SIGNIFICANT CHANGE UP (ref 2.5–4.5)
PLAT MORPH BLD: ABNORMAL
PLATELET # BLD AUTO: 375 K/UL — SIGNIFICANT CHANGE UP (ref 150–400)
POIKILOCYTOSIS BLD QL AUTO: SLIGHT — SIGNIFICANT CHANGE UP
POTASSIUM SERPL-MCNC: 4.5 MMOL/L — SIGNIFICANT CHANGE UP (ref 3.5–5.3)
POTASSIUM SERPL-SCNC: 4.5 MMOL/L — SIGNIFICANT CHANGE UP (ref 3.5–5.3)
RBC # BLD: 3.5 M/UL — LOW (ref 4.2–5.8)
RBC # FLD: 18.6 % — HIGH (ref 10.3–14.5)
RBC BLD AUTO: ABNORMAL
SCHISTOCYTES BLD QL AUTO: SLIGHT — SIGNIFICANT CHANGE UP
SODIUM SERPL-SCNC: 141 MMOL/L — SIGNIFICANT CHANGE UP (ref 135–145)
WBC # BLD: 17.03 K/UL — HIGH (ref 3.8–10.5)
WBC # FLD AUTO: 17.03 K/UL — HIGH (ref 3.8–10.5)

## 2024-09-20 PROCEDURE — 99232 SBSQ HOSP IP/OBS MODERATE 35: CPT | Mod: GC

## 2024-09-20 RX ORDER — HYDROMORPHONE HYDROCHLORIDE 1 MG/ML
0.2 INJECTION, SOLUTION INTRAMUSCULAR; INTRAVENOUS; SUBCUTANEOUS EVERY 6 HOURS
Refills: 0 | Status: DISCONTINUED | OUTPATIENT
Start: 2024-09-20 | End: 2024-09-24

## 2024-09-20 RX ORDER — OXYCODONE HYDROCHLORIDE 30 MG/1
5 TABLET, FILM COATED, EXTENDED RELEASE ORAL EVERY 6 HOURS
Refills: 0 | Status: DISCONTINUED | OUTPATIENT
Start: 2024-09-20 | End: 2024-09-24

## 2024-09-20 RX ORDER — ELECTROLYTE SOLUTION
1 VIAL (ML) INTRAVENOUS
Refills: 0 | Status: DISCONTINUED | OUTPATIENT
Start: 2024-09-20 | End: 2024-09-20

## 2024-09-20 RX ORDER — ACETAMINOPHEN 325 MG
1000 TABLET ORAL EVERY 6 HOURS
Refills: 0 | Status: COMPLETED | OUTPATIENT
Start: 2024-09-20 | End: 2024-09-21

## 2024-09-20 RX ORDER — OXYCODONE HYDROCHLORIDE 30 MG/1
2.5 TABLET, FILM COATED, EXTENDED RELEASE ORAL EVERY 6 HOURS
Refills: 0 | Status: DISCONTINUED | OUTPATIENT
Start: 2024-09-20 | End: 2024-09-24

## 2024-09-20 RX ORDER — BENZOCAINE AND LEVOMENTHOL 200; 5 MG/G; MG/G
1 SPRAY TOPICAL EVERY 4 HOURS
Refills: 0 | Status: DISCONTINUED | OUTPATIENT
Start: 2024-09-20 | End: 2024-09-24

## 2024-09-20 RX ORDER — SODIUM PHOSPHATE IN D5W 15MMOL/250
15 PLASTIC BAG, INJECTION (ML) INTRAVENOUS ONCE
Refills: 0 | Status: DISCONTINUED | OUTPATIENT
Start: 2024-09-20 | End: 2024-09-20

## 2024-09-20 RX ORDER — I.V. FAT EMULSION 20 G/100ML
0.86 EMULSION INTRAVENOUS
Qty: 50 | Refills: 0 | Status: DISCONTINUED | OUTPATIENT
Start: 2024-09-20 | End: 2024-09-20

## 2024-09-20 RX ADMIN — Medication 5000 UNIT(S): at 23:23

## 2024-09-20 RX ADMIN — Medication 5000 UNIT(S): at 15:01

## 2024-09-20 RX ADMIN — BENZOCAINE AND LEVOMENTHOL 1 LOZENGE: 200; 5 SPRAY TOPICAL at 18:02

## 2024-09-20 RX ADMIN — Medication 400 MILLIGRAM(S): at 06:06

## 2024-09-20 RX ADMIN — Medication 400 MILLIGRAM(S): at 12:15

## 2024-09-20 RX ADMIN — Medication 1 EACH: at 18:02

## 2024-09-20 RX ADMIN — Medication 400 MILLIGRAM(S): at 18:24

## 2024-09-20 RX ADMIN — I.V. FAT EMULSION 20.8 GM/KG/DAY: 20 EMULSION INTRAVENOUS at 18:02

## 2024-09-20 NOTE — PROGRESS NOTE ADULT - SUBJECTIVE AND OBJECTIVE BOX
SUBJECTIVE / INTERVAL HPI: Patient seen and examined at bedside. Reports feeling well after surgery. Pain is well controlled. No BM but burping gas.    ROS: negative unless otherwise stated above.    VITAL SIGNS:  Vital Signs Last 24 Hrs  T(C): 37.1 (20 Sep 2024 09:16), Max: 37.1 (20 Sep 2024 05:48)  T(F): 98.7 (20 Sep 2024 09:16), Max: 98.8 (20 Sep 2024 05:48)  HR: 91 (20 Sep 2024 09:16) (77 - 93)  BP: 128/81 (20 Sep 2024 09:16) (123/77 - 142/77)  BP(mean): 98 (19 Sep 2024 13:54) (98 - 102)  RR: 18 (20 Sep 2024 09:16) (12 - 18)  SpO2: 96% (20 Sep 2024 09:16) (96% - 99%)    Parameters below as of 20 Sep 2024 09:16  Patient On (Oxygen Delivery Method): room air          09-19-24 @ 07:01 - 09-20-24 @ 07:00  --------------------------------------------------------  IN: 2050.2 mL / OUT: 3435 mL / NET: -1384.8 mL    09-20-24 @ 07:01  - 09-20-24 @ 12:59  --------------------------------------------------------  IN: 443 mL / OUT: 490 mL / NET: -47 mL        PHYSICAL EXAM:    General: NAD, sitting up in bed  HEENT: MMM  Neck: supple  Cardiovascular: +S1/S2; RRR, no MRG  Respiratory: CTA B/L; no W/R/R, speaking in full sentences  Extremities: warm and dry; no edema, clubbing or cyanosis  psych: calm, appropriate, conversational    MEDICATIONS:  MEDICATIONS  (STANDING):  heparin   Injectable 5000 Unit(s) SubCutaneous every 8 hours  HYDROmorphone PCA (1 mG/mL) 30 milliLiter(s) PCA Continuous PCA Continuous  influenza   Vaccine 0.5 milliLiter(s) IntraMuscular once  lactated ringers. 1000 milliLiter(s) (40 mL/Hr) IV Continuous <Continuous>  lipid, fat emulsion (Fish Oil and Plant Based) 20% Infusion 0.8606 Gm/kG/Day (20.8 mL/Hr) IV Continuous <Continuous>  Parenteral Nutrition - Adult 1 Each TPN Continuous <Continuous>  Parenteral Nutrition - Adult 1 Each TPN Continuous <Continuous>    MEDICATIONS  (PRN):  naloxone Injectable 0.1 milliGRAM(s) IV Push every 3 minutes PRN For ANY of the following changes in patient status:  A. RR LESS THAN 10 breaths per minute, B. Oxygen saturation LESS THAN 90%, C. Sedation score of 6  ondansetron Injectable 4 milliGRAM(s) IV Push every 6 hours PRN Nausea and/or Vomiting      ALLERGIES:  Allergies    No Known Drug Allergies  shellfish (Anaphylaxis)    Intolerances        LABS:                        10.0   17.03 )-----------( 375      ( 20 Sep 2024 05:30 )             33.5     09-20    141  |  104  |  12  ----------------------------<  245[H]  4.5   |  29  |  0.61    Ca    8.7      20 Sep 2024 05:30  Phos  2.8     09-20  Mg     1.9     09-20        Urinalysis Basic - ( 20 Sep 2024 05:30 )    Color: x / Appearance: x / SG: x / pH: x  Gluc: 245 mg/dL / Ketone: x  / Bili: x / Urobili: x   Blood: x / Protein: x / Nitrite: x   Leuk Esterase: x / RBC: x / WBC x   Sq Epi: x / Non Sq Epi: x / Bacteria: x      CAPILLARY BLOOD GLUCOSE          RADIOLOGY & ADDITIONAL TESTS: Reviewed.

## 2024-09-20 NOTE — PROGRESS NOTE ADULT - ASSESSMENT
48 y/o M with HTN (not on meds) diagnosed with Gastric cancer 7/2024on chemotherapy who was admitted to [facility] for evaluation of his gastric cancer. Medicine consulted for optimization prior to surgery. now s/p gastrectomy.      #BLood loss Anemia/OSIRIS   - Patient with evidence of OSIRIS in July 2024 but labs on this admission are consistent with low-normal iron stores. Tsat 11%  - iron sucrose is out of stock -given ferrous gluconate- 125 per day -with aim for 8 doses total ( started 9/12)  would hold oral iron as in-patient, to give on discharge ( iron work best when given three times per week along with stool softener)  - hgb stable after surgery    #Nutrition/severe protein calorie malnutrition  # weight loss   - PICC line LUE placed by IR (9/10)   - on TPN now.     #gastric cancer  s/p gastrectomy  - as per surgery team  - f/u path results    Discussed with Dr. Cowan

## 2024-09-20 NOTE — PROGRESS NOTE ADULT - ASSESSMENT
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 Pt was sent here after seeing Dr. Carrasco in office for GI bleed now s/p EGD showing partial GOO due to gastric mass now s/p robotic distal gastrectomy with BR II (9/19)    NPO/IVF   PICC/TPN  PCA/ standing IV tylenol  TOV  OOBA/SCD/IS/SQH

## 2024-09-20 NOTE — PROGRESS NOTE ADULT - SUBJECTIVE AND OBJECTIVE BOX
INTERVAL HPI/OVERNIGHT EVENTS: NAEON    STATUS POST:    9/11: EGD showing partial GOO  9/19: robotic distal gastrectomy with BR II    POST OPERATIVE DAY #: 1    SUBJECTIVE: Pt seen and examined by chief resident. Pt is doing well, resting comfortably on bed. Pain controlled. No nausea or vomiting. No complaints at this time.    MEDICATIONS  (STANDING):  acetaminophen   IVPB .. 1000 milliGRAM(s) IV Intermittent every 6 hours  heparin   Injectable 5000 Unit(s) SubCutaneous every 8 hours  HYDROmorphone PCA (1 mG/mL) 30 milliLiter(s) PCA Continuous PCA Continuous  influenza   Vaccine 0.5 milliLiter(s) IntraMuscular once  lactated ringers. 1000 milliLiter(s) (40 mL/Hr) IV Continuous <Continuous>  lipid, fat emulsion (Fish Oil and Plant Based) 20% Infusion 0.8612 Gm/kG/Day (20.83 mL/Hr) IV Continuous <Continuous>  pantoprazole  Injectable 40 milliGRAM(s) IV Push daily  Parenteral Nutrition - Adult 1 Each TPN Continuous <Continuous>    MEDICATIONS  (PRN):  naloxone Injectable 0.1 milliGRAM(s) IV Push every 3 minutes PRN For ANY of the following changes in patient status:  A. RR LESS THAN 10 breaths per minute, B. Oxygen saturation LESS THAN 90%, C. Sedation score of 6  ondansetron Injectable 4 milliGRAM(s) IV Push every 6 hours PRN Nausea and/or Vomiting      Vital Signs Last 24 Hrs  T(C): 37.1 (20 Sep 2024 05:48), Max: 37.1 (20 Sep 2024 05:48)  T(F): 98.8 (20 Sep 2024 05:48), Max: 98.8 (20 Sep 2024 05:48)  HR: 93 (20 Sep 2024 05:48) (77 - 100)  BP: 130/79 (20 Sep 2024 05:48) (122/72 - 142/77)  BP(mean): 98 (19 Sep 2024 13:54) (92 - 104)  RR: 17 (20 Sep 2024 05:48) (12 - 21)  SpO2: 96% (20 Sep 2024 05:48) (96% - 99%)    Parameters below as of 20 Sep 2024 05:48  Patient On (Oxygen Delivery Method): room air        PHYSICAL EXAM:    Constitutional: A&Ox3, nad  Head: NGT in place, not secured by tape, draining minimal bilious outpt  Respiratory: non labored breathing, no respiratory distress  Cardiovascular: NSR, RRR  Gastrointestinal: abd soft, NT, ND                 Incision: C/D/I  Genitourinary: la in place with clear urine outpt  Extremities: (-) edema, wwp, SCDs in place        I&O's Detail    19 Sep 2024 07:01  -  20 Sep 2024 07:00  --------------------------------------------------------  IN:    Fat Emulsion (Fish Oil &amp; Plant Based) 20% Infusion: 187.2 mL    Lactated Ringers: 780 mL    TPN (Total Parenteral Nutrition): 1083 mL  Total IN: 2050.2 mL    OUT:    Bulb (mL): 0 mL    Indwelling Catheter - Urethral (mL): 3275 mL    Nasogastric/Oral tube (mL): 160 mL  Total OUT: 3435 mL    Total NET: -1384.8 mL          LABS:                        10.0   17.03 )-----------( 375      ( 20 Sep 2024 05:30 )             33.5     09-19    137  |  103  |  13  ----------------------------<  286[H]  4.5   |  27  |  0.76    Ca    8.7      19 Sep 2024 12:36        Urinalysis Basic - ( 19 Sep 2024 12:36 )    Color: x / Appearance: x / SG: x / pH: x  Gluc: 286 mg/dL / Ketone: x  / Bili: x / Urobili: x   Blood: x / Protein: x / Nitrite: x   Leuk Esterase: x / RBC: x / WBC x   Sq Epi: x / Non Sq Epi: x / Bacteria: x        RADIOLOGY & ADDITIONAL STUDIES:

## 2024-09-21 LAB
ANION GAP SERPL CALC-SCNC: 7 MMOL/L — SIGNIFICANT CHANGE UP (ref 5–17)
BASOPHILS # BLD AUTO: 0.09 K/UL — SIGNIFICANT CHANGE UP (ref 0–0.2)
BASOPHILS NFR BLD AUTO: 0.6 % — SIGNIFICANT CHANGE UP (ref 0–2)
BUN SERPL-MCNC: 12 MG/DL — SIGNIFICANT CHANGE UP (ref 7–23)
CALCIUM SERPL-MCNC: 9 MG/DL — SIGNIFICANT CHANGE UP (ref 8.4–10.5)
CHLORIDE SERPL-SCNC: 103 MMOL/L — SIGNIFICANT CHANGE UP (ref 96–108)
CO2 SERPL-SCNC: 29 MMOL/L — SIGNIFICANT CHANGE UP (ref 22–31)
CREAT SERPL-MCNC: 0.58 MG/DL — SIGNIFICANT CHANGE UP (ref 0.5–1.3)
EGFR: 118 ML/MIN/1.73M2 — SIGNIFICANT CHANGE UP
EOSINOPHIL # BLD AUTO: 0.33 K/UL — SIGNIFICANT CHANGE UP (ref 0–0.5)
EOSINOPHIL NFR BLD AUTO: 2.2 % — SIGNIFICANT CHANGE UP (ref 0–6)
GLUCOSE SERPL-MCNC: 189 MG/DL — HIGH (ref 70–99)
HCT VFR BLD CALC: 35.5 % — LOW (ref 39–50)
HGB BLD-MCNC: 10.8 G/DL — LOW (ref 13–17)
IMM GRANULOCYTES NFR BLD AUTO: 0.9 % — SIGNIFICANT CHANGE UP (ref 0–0.9)
LYMPHOCYTES # BLD AUTO: 1.07 K/UL — SIGNIFICANT CHANGE UP (ref 1–3.3)
LYMPHOCYTES # BLD AUTO: 7.1 % — LOW (ref 13–44)
MAGNESIUM SERPL-MCNC: 2 MG/DL — SIGNIFICANT CHANGE UP (ref 1.6–2.6)
MCHC RBC-ENTMCNC: 29.5 PG — SIGNIFICANT CHANGE UP (ref 27–34)
MCHC RBC-ENTMCNC: 30.4 GM/DL — LOW (ref 32–36)
MCV RBC AUTO: 97 FL — SIGNIFICANT CHANGE UP (ref 80–100)
MONOCYTES # BLD AUTO: 1.75 K/UL — HIGH (ref 0–0.9)
MONOCYTES NFR BLD AUTO: 11.7 % — SIGNIFICANT CHANGE UP (ref 2–14)
NEUTROPHILS # BLD AUTO: 11.62 K/UL — HIGH (ref 1.8–7.4)
NEUTROPHILS NFR BLD AUTO: 77.5 % — HIGH (ref 43–77)
NRBC # BLD: 0 /100 WBCS — SIGNIFICANT CHANGE UP (ref 0–0)
PHOSPHATE SERPL-MCNC: 3.7 MG/DL — SIGNIFICANT CHANGE UP (ref 2.5–4.5)
PLATELET # BLD AUTO: 328 K/UL — SIGNIFICANT CHANGE UP (ref 150–400)
POTASSIUM SERPL-MCNC: 4.2 MMOL/L — SIGNIFICANT CHANGE UP (ref 3.5–5.3)
POTASSIUM SERPL-SCNC: 4.2 MMOL/L — SIGNIFICANT CHANGE UP (ref 3.5–5.3)
RBC # BLD: 3.66 M/UL — LOW (ref 4.2–5.8)
RBC # FLD: 18.6 % — HIGH (ref 10.3–14.5)
SODIUM SERPL-SCNC: 139 MMOL/L — SIGNIFICANT CHANGE UP (ref 135–145)
WBC # BLD: 14.99 K/UL — HIGH (ref 3.8–10.5)
WBC # FLD AUTO: 14.99 K/UL — HIGH (ref 3.8–10.5)

## 2024-09-21 PROCEDURE — 99232 SBSQ HOSP IP/OBS MODERATE 35: CPT | Mod: GC

## 2024-09-21 RX ORDER — ACETAMINOPHEN 325 MG
1000 TABLET ORAL EVERY 6 HOURS
Refills: 0 | Status: COMPLETED | OUTPATIENT
Start: 2024-09-21 | End: 2024-09-22

## 2024-09-21 RX ORDER — I.V. FAT EMULSION 20 G/100ML
0.86 EMULSION INTRAVENOUS
Qty: 50 | Refills: 0 | Status: DISCONTINUED | OUTPATIENT
Start: 2024-09-21 | End: 2024-09-21

## 2024-09-21 RX ORDER — ELECTROLYTE SOLUTION
1 VIAL (ML) INTRAVENOUS
Refills: 0 | Status: DISCONTINUED | OUTPATIENT
Start: 2024-09-21 | End: 2024-09-21

## 2024-09-21 RX ADMIN — Medication 400 MILLIGRAM(S): at 22:05

## 2024-09-21 RX ADMIN — I.V. FAT EMULSION 20.83 GM/KG/DAY: 20 EMULSION INTRAVENOUS at 18:40

## 2024-09-21 RX ADMIN — OXYCODONE HYDROCHLORIDE 5 MILLIGRAM(S): 30 TABLET, FILM COATED, EXTENDED RELEASE ORAL at 23:30

## 2024-09-21 RX ADMIN — OXYCODONE HYDROCHLORIDE 2.5 MILLIGRAM(S): 30 TABLET, FILM COATED, EXTENDED RELEASE ORAL at 10:22

## 2024-09-21 RX ADMIN — Medication 5000 UNIT(S): at 15:22

## 2024-09-21 RX ADMIN — Medication 400 MILLIGRAM(S): at 13:03

## 2024-09-21 RX ADMIN — Medication 1 EACH: at 18:40

## 2024-09-21 RX ADMIN — OXYCODONE HYDROCHLORIDE 2.5 MILLIGRAM(S): 30 TABLET, FILM COATED, EXTENDED RELEASE ORAL at 11:22

## 2024-09-21 RX ADMIN — Medication 400 MILLIGRAM(S): at 06:44

## 2024-09-21 RX ADMIN — Medication 5000 UNIT(S): at 23:01

## 2024-09-21 RX ADMIN — Medication 400 MILLIGRAM(S): at 00:47

## 2024-09-21 RX ADMIN — OXYCODONE HYDROCHLORIDE 5 MILLIGRAM(S): 30 TABLET, FILM COATED, EXTENDED RELEASE ORAL at 23:01

## 2024-09-21 RX ADMIN — Medication 5000 UNIT(S): at 07:15

## 2024-09-21 NOTE — PROGRESS NOTE ADULT - ASSESSMENT
48 y/o M with HTN (not on meds) diagnosed with Gastric cancer 7/2024on chemotherapy who was admitted to [facility] for evaluation of his gastric cancer. Medicine consulted for optimization prior to surgery. now s/p gastrectomy POD 2.      #BLood loss Anemia/OSIRIS   - Patient with evidence of OSIRIS in July 2024 but labs on this admission are consistent with low-normal iron stores. Tsat 11%  - iron sucrose is out of stock -given ferrous gluconate- 125 per day -with aim for 8 doses total ( started 9/12)  would hold oral iron as in-patient, to give on discharge ( iron work best when given three times per week along with stool softener)  - hgb stable after surgery    #Nutrition/severe protein calorie malnutrition  # weight loss   - PICC line LUE placed by IR (9/10)   - on TPN now.  - on CLD    #gastric cancer  s/p gastrectomy  - as per surgery team  - f/u path results

## 2024-09-21 NOTE — PROGRESS NOTE ADULT - SUBJECTIVE AND OBJECTIVE BOX
SUBJECTIVE / INTERVAL HPI: Patient seen and examined at bedside. Reports doing well. Pain improved. Passing gas. Having clear liquids.    ROS: negative unless otherwise stated above.    VITAL SIGNS:  Vital Signs Last 24 Hrs  T(C): 36.9 (21 Sep 2024 13:20), Max: 37.1 (20 Sep 2024 16:37)  T(F): 98.4 (21 Sep 2024 13:20), Max: 98.7 (20 Sep 2024 16:37)  HR: 90 (21 Sep 2024 13:20) (80 - 90)  BP: 117/78 (21 Sep 2024 13:20) (117/78 - 134/80)  BP(mean): --  RR: 18 (21 Sep 2024 13:20) (17 - 18)  SpO2: 97% (21 Sep 2024 13:20) (94% - 97%)    Parameters below as of 21 Sep 2024 13:20  Patient On (Oxygen Delivery Method): room air          09-20-24 @ 07:01  -  09-21-24 @ 07:00  --------------------------------------------------------  IN: 2145.2 mL / OUT: 3760 mL / NET: -1614.8 mL    09-21-24 @ 07:01  -  09-21-24 @ 15:55  --------------------------------------------------------  IN: 487 mL / OUT: 450 mL / NET: 37 mL        PHYSICAL EXAM:    General: NAD, sitting up in bed  HEENT: MMM  Neck: supple  Cardiovascular: +S1/S2; RRR, no MRG  Respiratory: CTA B/L; no W/R/R, speaking in full sentences  GI: BS+, surgical incisions, R flank gauze soaked  Extremities: warm and dry; no edema, clubbing or cyanosis  psych: calm, appropriate, conversational    MEDICATIONS:  MEDICATIONS  (STANDING):  heparin   Injectable 5000 Unit(s) SubCutaneous every 8 hours  influenza   Vaccine 0.5 milliLiter(s) IntraMuscular once  lactated ringers. 1000 milliLiter(s) (40 mL/Hr) IV Continuous <Continuous>  lipid, fat emulsion (Fish Oil and Plant Based) 20% Infusion 0.8606 Gm/kG/Day (20.83 mL/Hr) IV Continuous <Continuous>  Parenteral Nutrition - Adult 1 Each TPN Continuous <Continuous>  Parenteral Nutrition - Adult 1 Each TPN Continuous <Continuous>    MEDICATIONS  (PRN):  benzocaine/menthol Lozenge 1 Lozenge Oral every 4 hours PRN Sore Throat  HYDROmorphone  Injectable 0.2 milliGRAM(s) IV Push every 6 hours PRN breakthrough pain  naloxone Injectable 0.1 milliGRAM(s) IV Push every 3 minutes PRN For ANY of the following changes in patient status:  A. RR LESS THAN 10 breaths per minute, B. Oxygen saturation LESS THAN 90%, C. Sedation score of 6  ondansetron Injectable 4 milliGRAM(s) IV Push every 6 hours PRN Nausea and/or Vomiting  oxyCODONE    IR 2.5 milliGRAM(s) Oral every 6 hours PRN Moderate Pain (4 - 6)  oxyCODONE    IR 5 milliGRAM(s) Oral every 6 hours PRN Severe Pain (7 - 10)      ALLERGIES:  Allergies    No Known Drug Allergies  shellfish (Anaphylaxis)    Intolerances        LABS:                        10.8   14.99 )-----------( 328      ( 21 Sep 2024 05:30 )             35.5     09-21    139  |  103  |  12  ----------------------------<  189[H]  4.2   |  29  |  0.58    Ca    9.0      21 Sep 2024 05:30  Phos  3.7     09-21  Mg     2.0     09-21        Urinalysis Basic - ( 21 Sep 2024 05:30 )    Color: x / Appearance: x / SG: x / pH: x  Gluc: 189 mg/dL / Ketone: x  / Bili: x / Urobili: x   Blood: x / Protein: x / Nitrite: x   Leuk Esterase: x / RBC: x / WBC x   Sq Epi: x / Non Sq Epi: x / Bacteria: x      CAPILLARY BLOOD GLUCOSE      POCT Blood Glucose.: 109 mg/dL (20 Sep 2024 17:24)      RADIOLOGY & ADDITIONAL TESTS: Reviewed.

## 2024-09-21 NOTE — PROGRESS NOTE ADULT - SUBJECTIVE AND OBJECTIVE BOX
INTERVAL HPI/OVERNIGHT EVENTS: FREDO    STATUS POST:  distal gastrectomy with BR II    POST OPERATIVE DAY #: 2    SUBJECTIVE:      MEDICATIONS  (STANDING):  acetaminophen   IVPB .. 1000 milliGRAM(s) IV Intermittent every 6 hours  heparin   Injectable 5000 Unit(s) SubCutaneous every 8 hours  influenza   Vaccine 0.5 milliLiter(s) IntraMuscular once  lactated ringers. 1000 milliLiter(s) (40 mL/Hr) IV Continuous <Continuous>  lipid, fat emulsion (Fish Oil and Plant Based) 20% Infusion 0.8606 Gm/kG/Day (20.8 mL/Hr) IV Continuous <Continuous>  Parenteral Nutrition - Adult 1 Each TPN Continuous <Continuous>    MEDICATIONS  (PRN):  benzocaine/menthol Lozenge 1 Lozenge Oral every 4 hours PRN Sore Throat  HYDROmorphone  Injectable 0.2 milliGRAM(s) IV Push every 6 hours PRN breakthrough pain  naloxone Injectable 0.1 milliGRAM(s) IV Push every 3 minutes PRN For ANY of the following changes in patient status:  A. RR LESS THAN 10 breaths per minute, B. Oxygen saturation LESS THAN 90%, C. Sedation score of 6  ondansetron Injectable 4 milliGRAM(s) IV Push every 6 hours PRN Nausea and/or Vomiting  oxyCODONE    IR 2.5 milliGRAM(s) Oral every 6 hours PRN Moderate Pain (4 - 6)  oxyCODONE    IR 5 milliGRAM(s) Oral every 6 hours PRN Severe Pain (7 - 10)      Vital Signs Last 24 Hrs  T(C): 36.8 (21 Sep 2024 04:50), Max: 37.2 (20 Sep 2024 13:58)  T(F): 98.2 (21 Sep 2024 04:50), Max: 99 (20 Sep 2024 13:58)  HR: 81 (21 Sep 2024 04:50) (81 - 91)  BP: 127/85 (21 Sep 2024 04:50) (117/83 - 128/81)  BP(mean): --  RR: 17 (21 Sep 2024 04:50) (17 - 18)  SpO2: 94% (21 Sep 2024 04:50) (94% - 97%)    Parameters below as of 21 Sep 2024 04:50  Patient On (Oxygen Delivery Method): room air        Physical exam:  General Appearance: Appears well, NAD  Pulmonary: Nonlabored breathing, no respiratory distress  HEENT: [facility]  Abdomen: Soft, nondistended, nontender  Extremities: WWP, SCD's in place     I&O's Detail    19 Sep 2024 07:01  -  20 Sep 2024 07:00  --------------------------------------------------------  IN:    Fat Emulsion (Fish Oil &amp; Plant Based) 20% Infusion: 187.2 mL    Lactated Ringers: 780 mL    TPN (Total Parenteral Nutrition): 1083 mL  Total IN: 2050.2 mL    OUT:    Bulb (mL): 0 mL    Indwelling Catheter - Urethral (mL): 3275 mL    Nasogastric/Oral tube (mL): 160 mL  Total OUT: 3435 mL    Total NET: -1384.8 mL      20 Sep 2024 07:01  -  21 Sep 2024 06:55  --------------------------------------------------------  IN:    Fat Emulsion (Fish Oil &amp; Plant Based) 20% Infusion: 187.2 mL    Lactated Ringers: 800 mL    TPN (Total Parenteral Nutrition): 1158 mL  Total IN: 2145.2 mL    OUT:    Bulb (mL): 60 mL    Nasogastric/Oral tube (mL): 100 mL    Voided (mL): 3600 mL  Total OUT: 3760 mL    Total NET: -1614.8 mL          LABS:                        10.0   17.03 )-----------( 375      ( 20 Sep 2024 05:30 )             33.5     09-20    141  |  104  |  12  ----------------------------<  245[H]  4.5   |  29  |  0.61    Ca    8.7      20 Sep 2024 05:30  Phos  2.8     09-20  Mg     1.9     09-20        Urinalysis Basic - ( 20 Sep 2024 05:30 )    Color: x / Appearance: x / SG: x / pH: x  Gluc: 245 mg/dL / Ketone: x  / Bili: x / Urobili: x   Blood: x / Protein: x / Nitrite: x   Leuk Esterase: x / RBC: x / WBC x   Sq Epi: x / Non Sq Epi: x / Bacteria: x        RADIOLOGY & ADDITIONAL STUDIES: INTERVAL HPI/OVERNIGHT EVENTS: FREDO    STATUS POST:  distal gastrectomy with BR II    POST OPERATIVE DAY #: 2    SUBJECTIVE: Patient seen and examined with chief resident on morning rounds. Reports that pain is controlled. Denies any nausea or vomiting and is tolerating clears. Has passed gas but not yet had a bowel movement.       MEDICATIONS  (STANDING):  acetaminophen   IVPB .. 1000 milliGRAM(s) IV Intermittent every 6 hours  heparin   Injectable 5000 Unit(s) SubCutaneous every 8 hours  influenza   Vaccine 0.5 milliLiter(s) IntraMuscular once  lactated ringers. 1000 milliLiter(s) (40 mL/Hr) IV Continuous <Continuous>  lipid, fat emulsion (Fish Oil and Plant Based) 20% Infusion 0.8606 Gm/kG/Day (20.8 mL/Hr) IV Continuous <Continuous>  Parenteral Nutrition - Adult 1 Each TPN Continuous <Continuous>    MEDICATIONS  (PRN):  benzocaine/menthol Lozenge 1 Lozenge Oral every 4 hours PRN Sore Throat  HYDROmorphone  Injectable 0.2 milliGRAM(s) IV Push every 6 hours PRN breakthrough pain  naloxone Injectable 0.1 milliGRAM(s) IV Push every 3 minutes PRN For ANY of the following changes in patient status:  A. RR LESS THAN 10 breaths per minute, B. Oxygen saturation LESS THAN 90%, C. Sedation score of 6  ondansetron Injectable 4 milliGRAM(s) IV Push every 6 hours PRN Nausea and/or Vomiting  oxyCODONE    IR 2.5 milliGRAM(s) Oral every 6 hours PRN Moderate Pain (4 - 6)  oxyCODONE    IR 5 milliGRAM(s) Oral every 6 hours PRN Severe Pain (7 - 10)      Vital Signs Last 24 Hrs  T(C): 36.8 (21 Sep 2024 04:50), Max: 37.2 (20 Sep 2024 13:58)  T(F): 98.2 (21 Sep 2024 04:50), Max: 99 (20 Sep 2024 13:58)  HR: 81 (21 Sep 2024 04:50) (81 - 91)  BP: 127/85 (21 Sep 2024 04:50) (117/83 - 128/81)  BP(mean): --  RR: 17 (21 Sep 2024 04:50) (17 - 18)  SpO2: 94% (21 Sep 2024 04:50) (94% - 97%)    Parameters below as of 21 Sep 2024 04:50  Patient On (Oxygen Delivery Method): room air        Physical exam:  General Appearance: Appears well, NAD  Pulmonary: Nonlabored breathing, no respiratory distress  HEENT: [facility]  Abdomen: Soft, nondistended, appropriately tender. DANETTE SS.   Extremities: WWP, SCD's in place     I&O's Detail    19 Sep 2024 07:01  -  20 Sep 2024 07:00  --------------------------------------------------------  IN:    Fat Emulsion (Fish Oil &amp; Plant Based) 20% Infusion: 187.2 mL    Lactated Ringers: 780 mL    TPN (Total Parenteral Nutrition): 1083 mL  Total IN: 2050.2 mL    OUT:    Bulb (mL): 0 mL    Indwelling Catheter - Urethral (mL): 3275 mL    Nasogastric/Oral tube (mL): 160 mL  Total OUT: 3435 mL    Total NET: -1384.8 mL      20 Sep 2024 07:01  -  21 Sep 2024 06:55  --------------------------------------------------------  IN:    Fat Emulsion (Fish Oil &amp; Plant Based) 20% Infusion: 187.2 mL    Lactated Ringers: 800 mL    TPN (Total Parenteral Nutrition): 1158 mL  Total IN: 2145.2 mL    OUT:    Bulb (mL): 60 mL    Nasogastric/Oral tube (mL): 100 mL    Voided (mL): 3600 mL  Total OUT: 3760 mL    Total NET: -1614.8 mL          LABS:                        10.0   17.03 )-----------( 375      ( 20 Sep 2024 05:30 )             33.5     09-20    141  |  104  |  12  ----------------------------<  245[H]  4.5   |  29  |  0.61    Ca    8.7      20 Sep 2024 05:30  Phos  2.8     09-20  Mg     1.9     09-20        Urinalysis Basic - ( 20 Sep 2024 05:30 )    Color: x / Appearance: x / SG: x / pH: x  Gluc: 245 mg/dL / Ketone: x  / Bili: x / Urobili: x   Blood: x / Protein: x / Nitrite: x   Leuk Esterase: x / RBC: x / WBC x   Sq Epi: x / Non Sq Epi: x / Bacteria: x        RADIOLOGY & ADDITIONAL STUDIES:

## 2024-09-21 NOTE — PROGRESS NOTE ADULT - ASSESSMENT
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 Pt was sent here after seeing Dr. Carrasco in office for GI bleed now s/p EGD showing partial GOO due to gastric mass now s/p robotic distal gastrectomy with BR II (9/19)    CLD/IVF   PICC/TPN  standing IV tylenol  OOBA/SCD/IS/SQH  AM labs

## 2024-09-22 LAB
ANION GAP SERPL CALC-SCNC: 9 MMOL/L — SIGNIFICANT CHANGE UP (ref 5–17)
BASOPHILS # BLD AUTO: 0.09 K/UL — SIGNIFICANT CHANGE UP (ref 0–0.2)
BASOPHILS NFR BLD AUTO: 0.7 % — SIGNIFICANT CHANGE UP (ref 0–2)
BUN SERPL-MCNC: 10 MG/DL — SIGNIFICANT CHANGE UP (ref 7–23)
CALCIUM SERPL-MCNC: 8.4 MG/DL — SIGNIFICANT CHANGE UP (ref 8.4–10.5)
CHLORIDE SERPL-SCNC: 98 MMOL/L — SIGNIFICANT CHANGE UP (ref 96–108)
CO2 SERPL-SCNC: 27 MMOL/L — SIGNIFICANT CHANGE UP (ref 22–31)
CREAT SERPL-MCNC: 0.6 MG/DL — SIGNIFICANT CHANGE UP (ref 0.5–1.3)
EGFR: 117 ML/MIN/1.73M2 — SIGNIFICANT CHANGE UP
EOSINOPHIL # BLD AUTO: 0.59 K/UL — HIGH (ref 0–0.5)
EOSINOPHIL NFR BLD AUTO: 4.8 % — SIGNIFICANT CHANGE UP (ref 0–6)
GLUCOSE SERPL-MCNC: 151 MG/DL — HIGH (ref 70–99)
HCT VFR BLD CALC: 34.1 % — LOW (ref 39–50)
HGB BLD-MCNC: 10.2 G/DL — LOW (ref 13–17)
IMM GRANULOCYTES NFR BLD AUTO: 0.6 % — SIGNIFICANT CHANGE UP (ref 0–0.9)
LYMPHOCYTES # BLD AUTO: 1.43 K/UL — SIGNIFICANT CHANGE UP (ref 1–3.3)
LYMPHOCYTES # BLD AUTO: 11.6 % — LOW (ref 13–44)
MAGNESIUM SERPL-MCNC: 1.7 MG/DL — SIGNIFICANT CHANGE UP (ref 1.6–2.6)
MCHC RBC-ENTMCNC: 28.3 PG — SIGNIFICANT CHANGE UP (ref 27–34)
MCHC RBC-ENTMCNC: 29.9 GM/DL — LOW (ref 32–36)
MCV RBC AUTO: 94.7 FL — SIGNIFICANT CHANGE UP (ref 80–100)
MONOCYTES # BLD AUTO: 1.61 K/UL — HIGH (ref 0–0.9)
MONOCYTES NFR BLD AUTO: 13 % — SIGNIFICANT CHANGE UP (ref 2–14)
NEUTROPHILS # BLD AUTO: 8.58 K/UL — HIGH (ref 1.8–7.4)
NEUTROPHILS NFR BLD AUTO: 69.3 % — SIGNIFICANT CHANGE UP (ref 43–77)
NRBC # BLD: 0 /100 WBCS — SIGNIFICANT CHANGE UP (ref 0–0)
PHOSPHATE SERPL-MCNC: 3.7 MG/DL — SIGNIFICANT CHANGE UP (ref 2.5–4.5)
PLATELET # BLD AUTO: 307 K/UL — SIGNIFICANT CHANGE UP (ref 150–400)
POTASSIUM SERPL-MCNC: 3.8 MMOL/L — SIGNIFICANT CHANGE UP (ref 3.5–5.3)
POTASSIUM SERPL-SCNC: 3.8 MMOL/L — SIGNIFICANT CHANGE UP (ref 3.5–5.3)
RBC # BLD: 3.6 M/UL — LOW (ref 4.2–5.8)
RBC # FLD: 17.4 % — HIGH (ref 10.3–14.5)
SODIUM SERPL-SCNC: 134 MMOL/L — LOW (ref 135–145)
WBC # BLD: 12.38 K/UL — HIGH (ref 3.8–10.5)
WBC # FLD AUTO: 12.38 K/UL — HIGH (ref 3.8–10.5)

## 2024-09-22 PROCEDURE — 99232 SBSQ HOSP IP/OBS MODERATE 35: CPT

## 2024-09-22 RX ORDER — ELECTROLYTE SOLUTION
1 VIAL (ML) INTRAVENOUS
Refills: 0 | Status: DISCONTINUED | OUTPATIENT
Start: 2024-09-22 | End: 2024-09-22

## 2024-09-22 RX ORDER — I.V. FAT EMULSION 20 G/100ML
0.86 EMULSION INTRAVENOUS
Qty: 50 | Refills: 0 | Status: DISCONTINUED | OUTPATIENT
Start: 2024-09-22 | End: 2024-09-22

## 2024-09-22 RX ADMIN — Medication 5000 UNIT(S): at 22:20

## 2024-09-22 RX ADMIN — Medication 5000 UNIT(S): at 06:44

## 2024-09-22 RX ADMIN — Medication 5000 UNIT(S): at 16:05

## 2024-09-22 RX ADMIN — Medication 1 EACH: at 19:05

## 2024-09-22 RX ADMIN — I.V. FAT EMULSION 20.8 GM/KG/DAY: 20 EMULSION INTRAVENOUS at 19:05

## 2024-09-22 RX ADMIN — Medication 400 MILLIGRAM(S): at 05:48

## 2024-09-22 RX ADMIN — Medication 400 MILLIGRAM(S): at 12:05

## 2024-09-22 NOTE — PROGRESS NOTE ADULT - SUBJECTIVE AND OBJECTIVE BOX
HX: POD 4 s/p RA distal gastrectomy with Billroth II     SUBJECTIVE: Patient seen and examined bedside by chief resident. Patient reports doing well this morning, reports tolerating diet. Patient denies abdominal pain, nausea, vomiting, CP, SOB.    heparin   Injectable 5000 Unit(s) SubCutaneous every 8 hours    MEDICATIONS  (PRN):  benzocaine/menthol Lozenge 1 Lozenge Oral every 4 hours PRN Sore Throat  HYDROmorphone  Injectable 0.2 milliGRAM(s) IV Push every 6 hours PRN breakthrough pain  naloxone Injectable 0.1 milliGRAM(s) IV Push every 3 minutes PRN For ANY of the following changes in patient status:  A. RR LESS THAN 10 breaths per minute, B. Oxygen saturation LESS THAN 90%, C. Sedation score of 6  ondansetron Injectable 4 milliGRAM(s) IV Push every 6 hours PRN Nausea and/or Vomiting  oxyCODONE    IR 5 milliGRAM(s) Oral every 6 hours PRN Severe Pain (7 - 10)  oxyCODONE    IR 2.5 milliGRAM(s) Oral every 6 hours PRN Moderate Pain (4 - 6)      I&O's Detail    21 Sep 2024 07:01  -  22 Sep 2024 07:00  --------------------------------------------------------  IN:    Fat Emulsion (Fish Oil &amp; Plant Based) 20% Infusion: 41.6 mL    Lactated Ringers: 600 mL    TPN (Total Parenteral Nutrition): 486 mL  Total IN: 1127.6 mL    OUT:    Voided (mL): 2600 mL  Total OUT: 2600 mL    Total NET: -1472.4 mL          Vital Signs Last 24 Hrs  T(C): 36.8 (22 Sep 2024 05:21), Max: 37.3 (21 Sep 2024 20:20)  T(F): 98.2 (22 Sep 2024 05:21), Max: 99.2 (21 Sep 2024 20:20)  HR: 82 (22 Sep 2024 05:21) (80 - 90)  BP: 129/78 (22 Sep 2024 05:21) (117/78 - 132/85)  BP(mean): 94 (22 Sep 2024 00:15) (94 - 94)  RR: 17 (22 Sep 2024 05:21) (17 - 18)  SpO2: 97% (22 Sep 2024 05:21) (96% - 99%)    Parameters below as of 22 Sep 2024 05:21  Patient On (Oxygen Delivery Method): room air        PHYSICAL EXAM:   Gen: NAD, resting comfortably   CV: NSR  Pulm: no respiratory distress on RA, CTAB   Abd: soft, ND, NTTP, no rebound or guarding. Incisions C/D/I   Ext: WWP, no edema   Neuro: motor/sensory grossly intact     LABS:                        10.2   12.38 )-----------( 307      ( 22 Sep 2024 05:30 )             34.1     09-22    134[L]  |  98  |  10  ----------------------------<  151[H]  3.8   |  27  |  0.60    Ca    8.4      22 Sep 2024 05:30  Phos  3.7     09-22  Mg     1.7     09-22          Urinalysis Basic - ( 22 Sep 2024 05:30 )    Color: x / Appearance: x / SG: x / pH: x  Gluc: 151 mg/dL / Ketone: x  / Bili: x / Urobili: x   Blood: x / Protein: x / Nitrite: x   Leuk Esterase: x / RBC: x / WBC x   Sq Epi: x / Non Sq Epi: x / Bacteria: x

## 2024-09-22 NOTE — PROGRESS NOTE ADULT - SUBJECTIVE AND OBJECTIVE BOX
Patient is a 49y old  Male who presents with a chief complaint of GI bleed (22 Sep 2024 06:25)    INTERVAL EVENTS: NAEON    SUBJECTIVE:  Patient was seen and examined at bedside. Tolerating CLD, has flatus, no BM yet. Denies N/V,etc. in good spirits    Review of systems: No fever, chills, dizziness, HA, Changes in vision, CP, dyspnea, nausea or vomiting, dysuria, changes in bowel movements, LE edema. Rest of 12 point Review of systems negative unless otherwise documented elsewhere in note.     Diet, Clear Liquid:   Consistent Carbohydrate No Snacks (CSTCHO) (09-20-24 @ 17:42) [Active]      MEDICATIONS:  MEDICATIONS  (STANDING):  acetaminophen   IVPB .. 1000 milliGRAM(s) IV Intermittent every 6 hours  heparin   Injectable 5000 Unit(s) SubCutaneous every 8 hours  influenza   Vaccine 0.5 milliLiter(s) IntraMuscular once  lactated ringers. 1000 milliLiter(s) (40 mL/Hr) IV Continuous <Continuous>  lipid, fat emulsion (Fish Oil and Plant Based) 20% Infusion 0.8606 Gm/kG/Day (20.8 mL/Hr) IV Continuous <Continuous>  Parenteral Nutrition - Adult 1 Each TPN Continuous <Continuous>  Parenteral Nutrition - Adult 1 Each TPN Continuous <Continuous>    MEDICATIONS  (PRN):  benzocaine/menthol Lozenge 1 Lozenge Oral every 4 hours PRN Sore Throat  HYDROmorphone  Injectable 0.2 milliGRAM(s) IV Push every 6 hours PRN breakthrough pain  naloxone Injectable 0.1 milliGRAM(s) IV Push every 3 minutes PRN For ANY of the following changes in patient status:  A. RR LESS THAN 10 breaths per minute, B. Oxygen saturation LESS THAN 90%, C. Sedation score of 6  ondansetron Injectable 4 milliGRAM(s) IV Push every 6 hours PRN Nausea and/or Vomiting  oxyCODONE    IR 2.5 milliGRAM(s) Oral every 6 hours PRN Moderate Pain (4 - 6)  oxyCODONE    IR 5 milliGRAM(s) Oral every 6 hours PRN Severe Pain (7 - 10)      Allergies    No Known Drug Allergies  shellfish (Anaphylaxis)    Intolerances        OBJECTIVE:  Vital Signs Last 24 Hrs  T(C): 36.4 (22 Sep 2024 13:21), Max: 37.3 (21 Sep 2024 20:20)  T(F): 97.6 (22 Sep 2024 13:21), Max: 99.2 (21 Sep 2024 20:20)  HR: 88 (22 Sep 2024 13:21) (80 - 88)  BP: 114/72 (22 Sep 2024 13:21) (114/72 - 132/85)  BP(mean): 94 (22 Sep 2024 00:15) (94 - 94)  RR: 18 (22 Sep 2024 13:21) (17 - 18)  SpO2: 98% (22 Sep 2024 13:21) (96% - 99%)    Parameters below as of 22 Sep 2024 13:21  Patient On (Oxygen Delivery Method): room air      I&O's Summary    21 Sep 2024 07:01  -  22 Sep 2024 07:00  --------------------------------------------------------  IN: 1127.6 mL / OUT: 2600 mL / NET: -1472.4 mL    22 Sep 2024 07:01  -  22 Sep 2024 16:32  --------------------------------------------------------  IN: 1657 mL / OUT: 1550 mL / NET: 107 mL        PHYSICAL EXAM:  Gen: Reclining in bed at time of exam, appears stated age  HEENT: NCAT, MMM, clear OP  Neck: supple, trachea at midline  CV: RRR, +S1/S2  Pulm: adequate respiratory effort, no increase in work of breathing  Abd: soft, NTND  Skin: warm and dry,   Ext: WWP, no LE edema  Neuro: AOx3, no gross focal neurological deficits  Psych: affect and behavior appropriate, pleasant at time of interview  :     LABS:                        10.2   12.38 )-----------( 307      ( 22 Sep 2024 05:30 )             34.1     09-22    134[L]  |  98  |  10  ----------------------------<  151[H]  3.8   |  27  |  0.60    Ca    8.4      22 Sep 2024 05:30  Phos  3.7     09-22  Mg     1.7     09-22          CAPILLARY BLOOD GLUCOSE        Urinalysis Basic - ( 22 Sep 2024 05:30 )    Color: x / Appearance: x / SG: x / pH: x  Gluc: 151 mg/dL / Ketone: x  / Bili: x / Urobili: x   Blood: x / Protein: x / Nitrite: x   Leuk Esterase: x / RBC: x / WBC x   Sq Epi: x / Non Sq Epi: x / Bacteria: x        MICRODATA:      RADIOLOGY/OTHER STUDIES:    PCP  Pharmacy:   Emergency contact:

## 2024-09-22 NOTE — PROGRESS NOTE ADULT - ASSESSMENT
50 y/o M with HTN (not on meds) diagnosed with Gastric cancer 7/2024 s/p dx lap w/ peritoneal washings on 7/31, followed by [facility], on neoadjuvant chemo therapy (last session 8/28). Pt was sent here after seeing Dr. Carrasco in office for GI bleed now s/p EGD showing partial GOO due to gastric mass now s/p robotic distal gastrectomy with BR II (9/19)    CLD/IVF   PICC/TPN  standing IV tylenol  OOBA/SCD/IS/SQH  AM labs    Plan discussed with attending and chief resident.   ____________________________________________________  Kristan Alvarado - Resident   Surgery

## 2024-09-22 NOTE — PROGRESS NOTE ADULT - ASSESSMENT
50 y/o M with HTN (was on low dose Losartan previously but was taken off for 3 months) diagnosed with Gastric cancer 7/2024 s/p dx lap w/ peritoneal washings on 7/31, followed by Dr. Pereira, on neoadjuvant chemo therapy (last session 8/28). Patient reports he first was diagnosed with gastric cancer in July 2024 when he a a syncopal event, found to be anemic with an EGD that revealed the mass. He has since had two syncopal events (7/6 & 8/14) secondary to severe anemia; both of which were about 2 weeks after chemotherapy. He endorses approx 30 lbs unintentional weight loss since diagnosis. Most recently on 9/7/24, admitted to OSH for syncope, hgb 6.9 s/p  2 units PRBC transfusion. Pt was sent here after seeing Surgical Oncologist Dr. Carrasco today in office with complaints of black bowel movement the day before-     # RA/distal Gastrectomy with gastrojejunostomy creation (9/19- )POD#3  tolerating CLD/TPN  - possible advance diet in am   pain management as per primary team.     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 Tsat 11%  - iron sucrose is out of stock -given ferrous gluconate- 125mg per day -with aim for 8 doses total ( started 9/12-9/19)  would hold oral iron as in-patient, to give on discharge ( iron work best when given three times per week along with stool softener.   - His anemia is likely acute blood loss secondary to his gastric cancer  - no more bleeding noted, hb has been stable around 9 - 9.2 on 9/14, 9.9 on 9/17-   - GI f/u appreciated, EGD (9/11)reviewed 5cm gastric mass at lesser curvature w/ partial GOO, scope cannot to traverse- now on puree diet and tolerating .      #Nutrition/severe protein calorie malnutrition  # weight loss   - PICC line LUE placed by IR (9/10)   - on TPN now.   - CLD per surgery/advance diet per surgery   # incentive spirometer , ambulation as tolerated.     Thank you for having us participating with his care. Medicine will follow pt with you.

## 2024-09-23 LAB
ANION GAP SERPL CALC-SCNC: 10 MMOL/L — SIGNIFICANT CHANGE UP (ref 5–17)
BASOPHILS # BLD AUTO: 0.1 K/UL — SIGNIFICANT CHANGE UP (ref 0–0.2)
BASOPHILS NFR BLD AUTO: 0.8 % — SIGNIFICANT CHANGE UP (ref 0–2)
BUN SERPL-MCNC: 10 MG/DL — SIGNIFICANT CHANGE UP (ref 7–23)
CALCIUM SERPL-MCNC: 8.9 MG/DL — SIGNIFICANT CHANGE UP (ref 8.4–10.5)
CHLORIDE SERPL-SCNC: 103 MMOL/L — SIGNIFICANT CHANGE UP (ref 96–108)
CO2 SERPL-SCNC: 27 MMOL/L — SIGNIFICANT CHANGE UP (ref 22–31)
CREAT SERPL-MCNC: 0.61 MG/DL — SIGNIFICANT CHANGE UP (ref 0.5–1.3)
EGFR: 116 ML/MIN/1.73M2 — SIGNIFICANT CHANGE UP
EOSINOPHIL # BLD AUTO: 0.54 K/UL — HIGH (ref 0–0.5)
EOSINOPHIL NFR BLD AUTO: 4.3 % — SIGNIFICANT CHANGE UP (ref 0–6)
GLUCOSE SERPL-MCNC: 158 MG/DL — HIGH (ref 70–99)
HCT VFR BLD CALC: 33.9 % — LOW (ref 39–50)
HGB BLD-MCNC: 10.5 G/DL — LOW (ref 13–17)
IMM GRANULOCYTES NFR BLD AUTO: 0.6 % — SIGNIFICANT CHANGE UP (ref 0–0.9)
LYMPHOCYTES # BLD AUTO: 1.06 K/UL — SIGNIFICANT CHANGE UP (ref 1–3.3)
LYMPHOCYTES # BLD AUTO: 8.5 % — LOW (ref 13–44)
MAGNESIUM SERPL-MCNC: 1.9 MG/DL — SIGNIFICANT CHANGE UP (ref 1.6–2.6)
MCHC RBC-ENTMCNC: 28.8 PG — SIGNIFICANT CHANGE UP (ref 27–34)
MCHC RBC-ENTMCNC: 31 GM/DL — LOW (ref 32–36)
MCV RBC AUTO: 92.9 FL — SIGNIFICANT CHANGE UP (ref 80–100)
MONOCYTES # BLD AUTO: 1.32 K/UL — HIGH (ref 0–0.9)
MONOCYTES NFR BLD AUTO: 10.5 % — SIGNIFICANT CHANGE UP (ref 2–14)
NEUTROPHILS # BLD AUTO: 9.43 K/UL — HIGH (ref 1.8–7.4)
NEUTROPHILS NFR BLD AUTO: 75.3 % — SIGNIFICANT CHANGE UP (ref 43–77)
NRBC # BLD: 0 /100 WBCS — SIGNIFICANT CHANGE UP (ref 0–0)
PHOSPHATE SERPL-MCNC: 4.1 MG/DL — SIGNIFICANT CHANGE UP (ref 2.5–4.5)
PLATELET # BLD AUTO: 362 K/UL — SIGNIFICANT CHANGE UP (ref 150–400)
POTASSIUM SERPL-MCNC: 4 MMOL/L — SIGNIFICANT CHANGE UP (ref 3.5–5.3)
POTASSIUM SERPL-SCNC: 4 MMOL/L — SIGNIFICANT CHANGE UP (ref 3.5–5.3)
RBC # BLD: 3.65 M/UL — LOW (ref 4.2–5.8)
RBC # FLD: 16.9 % — HIGH (ref 10.3–14.5)
SODIUM SERPL-SCNC: 140 MMOL/L — SIGNIFICANT CHANGE UP (ref 135–145)
WBC # BLD: 12.52 K/UL — HIGH (ref 3.8–10.5)
WBC # FLD AUTO: 12.52 K/UL — HIGH (ref 3.8–10.5)

## 2024-09-23 PROCEDURE — 99232 SBSQ HOSP IP/OBS MODERATE 35: CPT | Mod: GC

## 2024-09-23 RX ORDER — ACETAMINOPHEN 325 MG
1000 TABLET ORAL EVERY 6 HOURS
Refills: 0 | Status: DISCONTINUED | OUTPATIENT
Start: 2024-09-23 | End: 2024-09-24

## 2024-09-23 RX ORDER — ELECTROLYTE SOLUTION
1 VIAL (ML) INTRAVENOUS
Refills: 0 | Status: DISCONTINUED | OUTPATIENT
Start: 2024-09-23 | End: 2024-09-24

## 2024-09-23 RX ORDER — I.V. FAT EMULSION 20 G/100ML
0.86 EMULSION INTRAVENOUS
Qty: 50 | Refills: 0 | Status: DISCONTINUED | OUTPATIENT
Start: 2024-09-23 | End: 2024-09-24

## 2024-09-23 RX ORDER — CHLORHEXIDINE GLUCONATE ORAL RINSE 1.2 MG/ML
1 SOLUTION DENTAL
Refills: 0 | Status: DISCONTINUED | OUTPATIENT
Start: 2024-09-23 | End: 2024-09-24

## 2024-09-23 RX ADMIN — Medication 1 EACH: at 17:55

## 2024-09-23 RX ADMIN — Medication 5000 UNIT(S): at 05:53

## 2024-09-23 RX ADMIN — I.V. FAT EMULSION 20.83 GM/KG/DAY: 20 EMULSION INTRAVENOUS at 17:55

## 2024-09-23 RX ADMIN — Medication 5000 UNIT(S): at 23:24

## 2024-09-23 RX ADMIN — CHLORHEXIDINE GLUCONATE ORAL RINSE 1 APPLICATION(S): 1.2 SOLUTION DENTAL at 07:53

## 2024-09-23 RX ADMIN — Medication 5000 UNIT(S): at 15:38

## 2024-09-23 NOTE — PROGRESS NOTE ADULT - SUBJECTIVE AND OBJECTIVE BOX
INTERVAL HPI/OVERNIGHT EVENTS: NAEON    STATUS POST:  9/11: EGD showing partial GOO  9/19: robotic distal gastrectomy with BR II    POST OPERATIVE DAY #: 4    SUBJECTIVE: Pt seen and examined by chief resident. Pt is doing well, resting comfortably on bed. Pain controlled. Diet tolerated. Ambulating out of bed. +F/+BM. No nausea or vomiting. No complaints at this time.    MEDICATIONS  (STANDING):  chlorhexidine 2% Cloths 1 Application(s) Topical <User Schedule>  heparin   Injectable 5000 Unit(s) SubCutaneous every 8 hours  influenza   Vaccine 0.5 milliLiter(s) IntraMuscular once  lactated ringers. 1000 milliLiter(s) (40 mL/Hr) IV Continuous <Continuous>  lipid, fat emulsion (Fish Oil and Plant Based) 20% Infusion 0.8606 Gm/kG/Day (20.8 mL/Hr) IV Continuous <Continuous>  Parenteral Nutrition - Adult 1 Each TPN Continuous <Continuous>    MEDICATIONS  (PRN):  benzocaine/menthol Lozenge 1 Lozenge Oral every 4 hours PRN Sore Throat  HYDROmorphone  Injectable 0.2 milliGRAM(s) IV Push every 6 hours PRN breakthrough pain  naloxone Injectable 0.1 milliGRAM(s) IV Push every 3 minutes PRN For ANY of the following changes in patient status:  A. RR LESS THAN 10 breaths per minute, B. Oxygen saturation LESS THAN 90%, C. Sedation score of 6  ondansetron Injectable 4 milliGRAM(s) IV Push every 6 hours PRN Nausea and/or Vomiting  oxyCODONE    IR 2.5 milliGRAM(s) Oral every 6 hours PRN Moderate Pain (4 - 6)  oxyCODONE    IR 5 milliGRAM(s) Oral every 6 hours PRN Severe Pain (7 - 10)      Vital Signs Last 24 Hrs  T(C): 36.7 (23 Sep 2024 05:38), Max: 37 (22 Sep 2024 08:58)  T(F): 98 (23 Sep 2024 05:38), Max: 98.6 (22 Sep 2024 08:58)  HR: 88 (23 Sep 2024 05:38) (77 - 88)  BP: 137/83 (23 Sep 2024 05:38) (114/72 - 137/83)  BP(mean): 98 (22 Sep 2024 17:16) (98 - 98)  RR: 15 (23 Sep 2024 05:38) (15 - 18)  SpO2: 97% (23 Sep 2024 05:38) (96% - 99%)    Parameters below as of 23 Sep 2024 05:38  Patient On (Oxygen Delivery Method): room air        PHYSICAL EXAM:    Constitutional: A&Ox3, nad  Respiratory: non labored breathing, no respiratory distress  Cardiovascular: NSR, RRR  Gastrointestinal:                 Incision:  Extremities: (-) edema, wwp, SCDs in place                  I&O's Detail    21 Sep 2024 07:01  -  22 Sep 2024 07:00  --------------------------------------------------------  IN:    Fat Emulsion (Fish Oil &amp; Plant Based) 20% Infusion: 41.6 mL    Lactated Ringers: 600 mL    TPN (Total Parenteral Nutrition): 486 mL  Total IN: 1127.6 mL    OUT:    Voided (mL): 2600 mL  Total OUT: 2600 mL    Total NET: -1472.4 mL      22 Sep 2024 07:01  -  23 Sep 2024 06:22  --------------------------------------------------------  IN:    Fat Emulsion (Fish Oil &amp; Plant Based) 20% Infusion: 104 mL    Lactated Ringers: 680 mL    Oral Fluid: 930 mL    TPN (Total Parenteral Nutrition): 822 mL  Total IN: 2536 mL    OUT:    Voided (mL): 2350 mL  Total OUT: 2350 mL    Total NET: 186 mL          LABS:                        10.2   12.38 )-----------( 307      ( 22 Sep 2024 05:30 )             34.1     09-22    134[L]  |  98  |  10  ----------------------------<  151[H]  3.8   |  27  |  0.60    Ca    8.4      22 Sep 2024 05:30  Phos  3.7     09-22  Mg     1.7     09-22        Urinalysis Basic - ( 22 Sep 2024 05:30 )    Color: x / Appearance: x / SG: x / pH: x  Gluc: 151 mg/dL / Ketone: x  / Bili: x / Urobili: x   Blood: x / Protein: x / Nitrite: x   Leuk Esterase: x / RBC: x / WBC x   Sq Epi: x / Non Sq Epi: x / Bacteria: x        RADIOLOGY & ADDITIONAL STUDIES: INTERVAL HPI/OVERNIGHT EVENTS: NAEON    STATUS POST:  9/11: EGD showing partial GOO  9/19: robotic distal gastrectomy with BR II    POST OPERATIVE DAY #: 4    SUBJECTIVE: Pt seen and examined by chief resident. Pt is doing well, resting comfortably on bed. Pain controlled. Diet tolerated. Ambulating out of bed. +F/-BM. No nausea or vomiting. No complaints at this time.    MEDICATIONS  (STANDING):  chlorhexidine 2% Cloths 1 Application(s) Topical <User Schedule>  heparin   Injectable 5000 Unit(s) SubCutaneous every 8 hours  influenza   Vaccine 0.5 milliLiter(s) IntraMuscular once  lactated ringers. 1000 milliLiter(s) (40 mL/Hr) IV Continuous <Continuous>  lipid, fat emulsion (Fish Oil and Plant Based) 20% Infusion 0.8606 Gm/kG/Day (20.8 mL/Hr) IV Continuous <Continuous>  Parenteral Nutrition - Adult 1 Each TPN Continuous <Continuous>    MEDICATIONS  (PRN):  benzocaine/menthol Lozenge 1 Lozenge Oral every 4 hours PRN Sore Throat  HYDROmorphone  Injectable 0.2 milliGRAM(s) IV Push every 6 hours PRN breakthrough pain  naloxone Injectable 0.1 milliGRAM(s) IV Push every 3 minutes PRN For ANY of the following changes in patient status:  A. RR LESS THAN 10 breaths per minute, B. Oxygen saturation LESS THAN 90%, C. Sedation score of 6  ondansetron Injectable 4 milliGRAM(s) IV Push every 6 hours PRN Nausea and/or Vomiting  oxyCODONE    IR 2.5 milliGRAM(s) Oral every 6 hours PRN Moderate Pain (4 - 6)  oxyCODONE    IR 5 milliGRAM(s) Oral every 6 hours PRN Severe Pain (7 - 10)      Vital Signs Last 24 Hrs  T(C): 36.7 (23 Sep 2024 05:38), Max: 37 (22 Sep 2024 08:58)  T(F): 98 (23 Sep 2024 05:38), Max: 98.6 (22 Sep 2024 08:58)  HR: 88 (23 Sep 2024 05:38) (77 - 88)  BP: 137/83 (23 Sep 2024 05:38) (114/72 - 137/83)  BP(mean): 98 (22 Sep 2024 17:16) (98 - 98)  RR: 15 (23 Sep 2024 05:38) (15 - 18)  SpO2: 97% (23 Sep 2024 05:38) (96% - 99%)    Parameters below as of 23 Sep 2024 05:38  Patient On (Oxygen Delivery Method): room air        PHYSICAL EXAM:    Constitutional: A&Ox3, nad  Respiratory: non labored breathing, no respiratory distress  Cardiovascular: NSR, RRR  Gastrointestinal: abd soft, NT, ND                 Incision: C/D/I  Extremities: (-) edema, wwp, SCDs in place                  I&O's Detail    21 Sep 2024 07:01  -  22 Sep 2024 07:00  --------------------------------------------------------  IN:    Fat Emulsion (Fish Oil &amp; Plant Based) 20% Infusion: 41.6 mL    Lactated Ringers: 600 mL    TPN (Total Parenteral Nutrition): 486 mL  Total IN: 1127.6 mL    OUT:    Voided (mL): 2600 mL  Total OUT: 2600 mL    Total NET: -1472.4 mL      22 Sep 2024 07:01  -  23 Sep 2024 06:22  --------------------------------------------------------  IN:    Fat Emulsion (Fish Oil &amp; Plant Based) 20% Infusion: 104 mL    Lactated Ringers: 680 mL    Oral Fluid: 930 mL    TPN (Total Parenteral Nutrition): 822 mL  Total IN: 2536 mL    OUT:    Voided (mL): 2350 mL  Total OUT: 2350 mL    Total NET: 186 mL          LABS:                        10.2   12.38 )-----------( 307      ( 22 Sep 2024 05:30 )             34.1     09-22    134[L]  |  98  |  10  ----------------------------<  151[H]  3.8   |  27  |  0.60    Ca    8.4      22 Sep 2024 05:30  Phos  3.7     09-22  Mg     1.7     09-22        Urinalysis Basic - ( 22 Sep 2024 05:30 )    Color: x / Appearance: x / SG: x / pH: x  Gluc: 151 mg/dL / Ketone: x  / Bili: x / Urobili: x   Blood: x / Protein: x / Nitrite: x   Leuk Esterase: x / RBC: x / WBC x   Sq Epi: x / Non Sq Epi: x / Bacteria: x        RADIOLOGY & ADDITIONAL STUDIES:

## 2024-09-23 NOTE — PROGRESS NOTE ADULT - ASSESSMENT
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 Pt was sent here after seeing Dr. Carrasco in office for GI bleed now s/p EGD showing partial GOO due to gastric mass now s/p robotic distal gastrectomy with BR II (9/19)    CLD/IVF @40  PICC/TPN  standing IV tylenol  OOBA/SCD/IS/SQH  AM labs 50 y/o M with HTN (not on meds) diagnosed with Gastric cancer 7/2024 s/p dx lap w/ peritoneal washings on 7/31, followed by [facility], on neoadjuvant chemo therapy (last session 8/28). Pt was sent here after seeing Dr. Carrasco in office for GI bleed now s/p EGD showing partial GOO due to gastric mass now s/p robotic distal gastrectomy with BR II (9/19)    Pureed  PICC/TPN  PO pain regimen  OOBA/SCD/IS/SQH  AM labs

## 2024-09-23 NOTE — PROGRESS NOTE ADULT - ASSESSMENT
48 y/o M with HTN (was on low dose Losartan previously but was taken off for 3 months) diagnosed with Gastric cancer 7/2024 s/p dx lap w/ peritoneal washings on 7/31, followed by Dr. Pereira, on neoadjuvant chemo therapy (last session 8/28). Patient reports he first was diagnosed with gastric cancer in July 2024 when he a a syncopal event, found to be anemic with an EGD that revealed the mass. He has since had two syncopal events (7/6 & 8/14) secondary to severe anemia; both of which were about 2 weeks after chemotherapy. He endorses approx 30 lbs unintentional weight loss since diagnosis. Most recently on 9/7/24, admitted to OSH for syncope, hgb 6.9 s/p  2 units PRBC transfusion. Pt was sent here after seeing Surgical Oncologist Dr. Carrasco today in office with complaints of black bowel movement the day before-     # RA/distal Gastrectomy with gastrojejunostomy creation (9/19- )POD#3  tolerating CLD/TPN  - possible advance diet in am   pain management as per primary team.     #Blood loss Anemia/OSIRIS-   - Patient with evidence of OSIRIS in July 2024 but labs on this admission are consistent with low-normal iron stores. Tsat 11%  - iron sucrose is out of stock -given ferrous gluconate- 125mg per day -with aim for 8 doses total ( started 9/12-9/19)  would hold oral iron as in-patient, to give on discharge ( iron work best when given three times per week along with stool softener.   - His anemia is likely acute blood loss secondary to his gastric cancer  - no more bleeding noted, hb has been stable around 9 - 9.2 on 9/14, 9.9 on 9/17-   - GI f/u appreciated, EGD (9/11)reviewed 5cm gastric mass at lesser curvature w/ partial GOO, scope cannot to traverse- now on puree diet and tolerating .      #Nutrition/severe protein calorie malnutrition  # weight loss   - PICC line LUE placed by IR (9/10)   - on TPN now.   - CLD per surgery/advance diet per surgery   # incentive spirometer , ambulation as tolerated.     INCOMPLETE 48 y/o M with HTN (was on low dose Losartan previously but was taken off for 3 months) diagnosed with Gastric cancer 7/2024 s/p dx lap w/ peritoneal washings on 7/31, followed by Dr. Pereira, on neoadjuvant chemo therapy (last session 8/28). Patient reports he first was diagnosed with gastric cancer in July 2024 when he a a syncopal event, found to be anemic with an EGD that revealed the mass. He has since had two syncopal events (7/6 & 8/14) secondary to severe anemia; both of which were about 2 weeks after chemotherapy. He endorses approx 30 lbs unintentional weight loss since diagnosis. Most recently on 9/7/24, admitted to OSH for syncope, hgb 6.9 s/p  2 units PRBC transfusion. Pt was sent here after seeing Surgical Oncologist Dr. Carrasco today in office with complaints of black bowel movement the day before-     # RA/distal Gastrectomy with gastrojejunostomy creation (9/19- )POD#3  tolerating CLD/TPN  - Advanced from CLD. Now on consistent carbohydrates with no evening snack + pureed diet as of 9-23-24 @ 07:43  pain management as per primary team.     #Blood loss Anemia/OSIRIS-   - Patient with evidence of OSIRIS in July 2024 but labs on this admission are consistent with low-normal iron stores. Tsat 11%  - iron sucrose is out of stock -given ferrous gluconate- 125mg per day -with aim for 8 doses total ( started 9/12-9/19)  would hold oral iron as in-patient, to give on discharge ( iron work best when given three times per week along with stool softener.   - His anemia is likely acute blood loss secondary to his gastric cancer  - no more bleeding noted, hb has been stable around 9 - 9.2 on 9/14, 9.9 on 9/17-   - GI f/u appreciated, EGD (9/11)reviewed 5cm gastric mass at lesser curvature w/ partial GOO, scope cannot to traverse- now on puree diet and tolerating .      #Nutrition/severe protein calorie malnutrition  # weight loss   - PICC line LUE placed by IR (9/10)   - on TPN now.   - Advanced from CLD. Now on consistent carbohydrates with no evening snack + pureed diet as of 9-23-24 @ 07:43  - CLD per surgery/advance diet per surgery   # incentive spirometer , ambulation as tolerated.     INCOMPLETE  48 y/o M with HTN (not on meds) diagnosed with Gastric cancer 7/2024on chemotherapy who was admitted to [facility] for evaluation of his gastric cancer. Medicine consulted for optimization prior to surgery. now s/p gastrectomy POD 4.    # RA/distal Gastrectomy with gastrojejunostomy creation (9/19- )POD#4  tolerating CLD/TPN  - Advanced from CLD. Now on consistent carbohydrates with no evening snack + pureed diet as of 9-23-24   pain management as per primary team, appears controlled    #Blood loss Anemia/OSIRIS-   - Patient with evidence of OSIRIS in July 2024 but labs on this admission are consistent with low-normal iron stores. Tsat 11%  - iron sucrose is out of stock -given ferrous gluconate- 125mg per day -with aim for 8 doses total ( started 9/12-9/19)  would hold oral iron as in-patient, to give on discharge ( iron work best when given three times per week along with stool softener.   - His anemia is likely acute blood loss secondary to his gastric cancer  - no more bleeding noted, hb has been stable around 9   - GI f/u appreciated, EGD (9/11)reviewed 5cm gastric mass at lesser curvature w/ partial GOO, scope cannot to traverse- now on puree diet and tolerating .    #Nutrition/severe protein calorie malnutrition  # weight loss   - PICC line LUE placed by IR (9/10)   - on TPN now.   - Advanced from CLD. Now on consistent carbohydrates with no evening snack + pureed diet as of 9-23-24 @ 07:43    # incentive spirometer , ambulation as tolerated.     Discussed with Attending.

## 2024-09-23 NOTE — CHART NOTE - NSCHARTNOTEFT_GEN_A_CORE
Admitting Diagnosis:   Patient is a 49y old  Male who presents with a chief complaint of GI bleed (16 Sep 2024 09:13)      PAST MEDICAL & SURGICAL HISTORY:  HTN (hypertension)  Malignant neoplasm of stomach  History of prediabetes  History of esophagogastroduodenoscopy (EGD)  Status post laparoscopic procedure      Current Nutrition Order: Diet, Regular:   Consistent Carbohydrate {No Snacks} (CSTCHO)  Pureed (PUREED) (09-11-24 @ 16:23) [Active]    Parenteral Nutrition - Adult 1 Each TPN Continuous <Continuous>, 09-16-24 @ 17:00, 17:00, Stop order after: 1 Days  Parenteral Nutrition - Adult 1 Each TPN Continuous <Continuous>, 09-15-24 @ 17:00, 17:00, Stop order after: 1 Days    Currently receiving TPN via PICC, currently provides:  1635Kcal, g protein, 2.9GIR of based on actual body weight (58.1kg), 23nonprotien kcal/kg, 1.3 grams protein/kg actual body weight (58.1kg)    PO Intake: Good (%) [ X ]  Fair (50-75%) [   ] Poor (<25%) [   ]    GI Issues: No issues with nausea, vomiting, diarrhea, constipation reported at time of visit. Last BM noted on . Pt is currently on a bowel regimen:     Pain: No indication of pain at time of visit.     Skin Integrity: No Pressure Injuries per flow sheets. Alfredo Score: 22  Edema: No Edema per flow sheets.       09-15-24 @ 07:01  -  09-16-24 @ 07:00  --------------------------------------------------------  IN: 1936 mL / OUT: 2850 mL / NET: -914 mL    09-16-24 @ 07:01  -  09-16-24 @ 13:14  --------------------------------------------------------  IN: 500 mL / OUT: 550 mL / NET: -50 mL        Labs:         CAPILLARY BLOOD GLUCOSE          Medications:  MEDICATIONS  (STANDING):  chlorhexidine 2% Cloths 1 Application(s) Topical <User Schedule>  influenza   Vaccine 0.5 milliLiter(s) IntraMuscular once  lipid, fat emulsion (Fish Oil and Plant Based) 20% Infusion 0.8612 Gm/kG/Day (20.8 mL/Hr) IV Continuous <Continuous>  pantoprazole  Injectable 40 milliGRAM(s) IV Push every 12 hours  Parenteral Nutrition - Adult 1 Each TPN Continuous <Continuous>  Parenteral Nutrition - Adult 1 Each TPN Continuous <Continuous>  sodium ferric gluconate complex IVPB 125 milliGRAM(s) IV Intermittent <User Schedule>  sucralfate suspension 1 Gram(s) Oral every 6 hours    MEDICATIONS  (PRN):  sodium chloride 0.9% lock flush 10 milliLiter(s) IV Push every 1 hour PRN Pre/post blood products, medications, blood draw, and to maintain line patency      Weight:  Dosing weight: 9/10 128 pounds;   UBW: 160 pounds (12/2023) Suggests 20% weight loss x ~9 months.   IBW: 148 pounds, %IBW: 86%.     No updated weights in charts; recommend obtaining updated weights as feasible. RD to continue to monitor as available.     [Severe Chronic Protein Calorie Malnutrition: Risk Assessment Completed 9/11]  - Wt Loss: > 10% in 6 months  - NFPE: Severe: orbital, clavicle; Moderate: buccal, temples, shoulders      Estimated energy needs:   Kcal: 30-40 kcal/kg = 9329-3209 kcal  Pro: 1.3-1.7 g pro/kg = 75-98 g pro  Fluids: 30-35ml/kg = 2592-4859 ml    Actual body weight (58.1kg) used to calculate energy needs due to pt's current body weight within % (86%) ideal body weight. Needs adjusted for malnutrition, clinical status.    Subjective:   "50 y/o M with HTN (not on meds) diagnosed with Gastric cancer 7/2024 s/p dx lap w/ peritoneal washings on 7/31, followed by [facility], on neoadjuvant chemo therapy (last session 8/28). Pt was sent here after seeing Dr. Carrasco in office with complaints of black bowel movements."     Visited pt at bedside for follow up assessment. Currently receiving TPN as supplementary nutrition to optimize status pending OR 9/19. Pt also on PO diet: Pureed. States that he has a good appetite, and has been consuming 100% of meals. RD continued to encourage PO intake and to consume nutrient dense foods as able. Reviewed nutrient dense options on the menu. Pt receptive and verbalizes understanding. No issues with N/V/C/D at this time. Last BM noted 9/15. Meds reviewed. sodium ferric gluconate, carafate. Nutritionally Pertinent Labs 9/10 Gluc: 100 (H). See Nutrition Recommendations below.     Previous Nutrition Diagnosis: Severe Chronic Malnutrition related to inadequate PO intake to meet increased physiological demand for nutrients as evidenced by Gastric CA    Active [ X ]  Resolved [   ]    Goal: Pt to meet >75% of estimated nutrition needs daily per course of hospitalization.    Recommendations:  1. Continue with PO diet as tolerated, defer diet texture and fluid consistency per team.   >>Recommend Ensure High Protein 2x/day [provides 350 kcals, 20g protein each]  2. As pt is receiving TPN via PICC, recommend continuing 245g Dextrose, 75g AA, 50g 20% Lipids to provide in total  1635Kcal, g protein, 2.9GIR of based on actual body weight (58.1kg), 23nonprotien kcal/kg, 1.3 grams protein/kg actual body weight (58.1kg)  >>Weekly lipid panel while on TPN, next 9/17  - hold lipid inj. if TG >400  >>>Daily BMP/Mg/Phos, fingersticks Q4-6hrs  - monitor lytes closely & replete outside TPN bag prn  3. Post sx if pt continues to need supplementary nutrition and medically feasible, consider overnight cyclic feeds of Jevity 1.5 @ GOAL 75ml/hr x 12hrs via optimal route (NGT/PEG/PEJ) plus 1 Liquid Protein Supplement (100kcal, 15g protein provided) Provides: 900ml total volume, 1450kcal, 72g protein, 684mL free H2O, 20nonprotien kcal/kg, 1.2g protein/kg actual body weight (58.1kg). Recommend starting at 10mL/hr and advancing by [id number] to goal as tolerated. Additional free H2O per team. Monitor for signs and symptoms of intolerance; maintain aspiration precautions at all times.  4. RDN will continue to monitor, reassess, and intervene as appropriate.     Education: RD continued to encourage PO intake and to consume nutrient dense foods as able. Reviewed nutrient dense options on the menu. Pt receptive and verbalizes understanding.    Risk Level: High [ X ] Moderate [   ] Low [   ]
Procedure: s/p robotic distal gastrectomy with BR II (9/19)  Surgeon: Dr. Carrasco    S: Pt has no complaints, endorses mild discomfort from urethral catheter. Denies CP, SOB, FREIRE, calf tenderness. Pain controlled with medication.    O:  T(C): --  T(F): --  HR: 77 (09-19-24 @ 13:54) (77 - 100)  BP: 133/74 (09-19-24 @ 13:54) (122/72 - 142/77)  RR: 12 (09-19-24 @ 13:54) (12 - 21)  SpO2: 99% (09-19-24 @ 13:54) (97% - 99%)  Wt(kg): --                        9.7    18.98 )-----------( 329      ( 19 Sep 2024 12:36 )             32.0     09-19    137  |  103  |  13  ----------------------------<  286[H]  4.5   |  27  |  0.76    Ca    8.7      19 Sep 2024 12:36      Physical exam:  Gen: NAD, resting comfortably in bed  Head: NGT in place  C/V: NSR  Pulm: Nonlabored breathing, no respiratory distress  Abd: soft, NT/ND         Incision: C/D/I with dermabond in place, R DANETTE with ss outpt  : la in place with clear urine outpt  Extrem: WWP, no calf edema, SCDs in place      A/P: 48 y/o M with HTN (not on meds) diagnosed with Gastric cancer 7/2024 s/p dx lap w/ peritoneal washings on 7/31, followed by [facility], on neoadjuvant chemo therapy (last session 8/28). Pt was sent here after seeing Dr. Carrasco in office for GI bleed now s/p EGD showing partial GOO due to gastric mass now s/p robotic distal gastrectomy with BR II (9/19)    NPO/IVF   NGT  PICC/TPN  PCA/ standing IV tylenol  La  OOBA/SCD/IS/SQH
Admitting Diagnosis:   Patient is a 49y old  Male who presents with a chief complaint of GI bleed (23 Sep 2024 09:02)      PAST MEDICAL & SURGICAL HISTORY:  HTN (hypertension)  Malignant neoplasm of stomach  History of prediabetes  History of esophagogastroduodenoscopy (EGD)  Status post laparoscopic procedure      Current Nutrition Order:  Diet, Regular:   Consistent Carbohydrate {No Snacks} (CSTCHO)  Pureed (PUREED) (09-23-24 @ 07:43) [Active]      Parenteral Nutrition - Adult 1 Each TPN Continuous <Continuous>, 09-22-24 @ 17:00, 17:00, Stop order after: 1 Days  Parenteral Nutrition - Adult 1 Each TPN Continuous <Continuous>, 09-23-24 @ 17:00, 17:00, Stop order after: 1 Days    Currently receiving TPN via PICC: Currently provides 1633Kcal, 245g Dex, 75g protein, 2.9GIR of based on actual body weight (58.1kg), 23nonprotien kcal/kg, 1.3 grams protein/kg actual body weight (58.1kg)    PO Intake: Good (%) [   ]  Fair (50-75%) [ X ] Poor (<25%) [   ]     GI Issues: No issues with nausea, vomiting, diarrhea, reported at time of visit. States that he has passing gas. Last BM noted on 9/15. Pt is not currently on a bowel regimen.    Pain: 0/10 pain per flowsheets     Skin Integrity: No Pressure Injuries per flow sheets. Surgical incisions: abdomen; Alfredo Score: 21  Edema: No Edema per flow sheets.       09-22-24 @ 07:01  -  09-23-24 @ 07:00  --------------------------------------------------------  IN: 2536 mL / OUT: 2350 mL / NET: 186 mL    09-23-24 @ 07:01  -  09-23-24 @ 12:57  --------------------------------------------------------  IN: 640 mL / OUT: 550 mL / NET: 90 mL        Labs:   09-23    140  |  103  |  10  ----------------------------<  158[H]  4.0   |  27  |  0.61    Ca    8.9      23 Sep 2024 05:30  Phos  4.1     09-23  Mg     1.9     09-23      CAPILLARY BLOOD GLUCOSE          Medications:  MEDICATIONS  (STANDING):  chlorhexidine 2% Cloths 1 Application(s) Topical <User Schedule>  heparin   Injectable 5000 Unit(s) SubCutaneous every 8 hours  influenza   Vaccine 0.5 milliLiter(s) IntraMuscular once  lipid, fat emulsion (Fish Oil and Plant Based) 20% Infusion 0.8606 Gm/kG/Day (20.83 mL/Hr) IV Continuous <Continuous>  Parenteral Nutrition - Adult 1 Each TPN Continuous <Continuous>  Parenteral Nutrition - Adult 1 Each TPN Continuous <Continuous>  polyethylene glycol 3350 17 Gram(s) Oral daily    MEDICATIONS  (PRN):  acetaminophen     Tablet .. 1000 milliGRAM(s) Oral every 6 hours PRN Mild Pain (1 - 3)  benzocaine/menthol Lozenge 1 Lozenge Oral every 4 hours PRN Sore Throat  HYDROmorphone  Injectable 0.2 milliGRAM(s) IV Push every 6 hours PRN breakthrough pain  naloxone Injectable 0.1 milliGRAM(s) IV Push every 3 minutes PRN For ANY of the following changes in patient status:  A. RR LESS THAN 10 breaths per minute, B. Oxygen saturation LESS THAN 90%, C. Sedation score of 6  ondansetron Injectable 4 milliGRAM(s) IV Push every 6 hours PRN Nausea and/or Vomiting  oxyCODONE    IR 5 milliGRAM(s) Oral every 6 hours PRN Severe Pain (7 - 10)  oxyCODONE    IR 2.5 milliGRAM(s) Oral every 6 hours PRN Moderate Pain (4 - 6)      Weight:  Dosing weight: 9/10 128 pounds;   UBW: 160 pounds (12/2023) Suggests 20% weight loss x ~9 months.   IBW: 148 pounds, %IBW: 86%.     No updated weights in charts; recommend obtaining updated weights as feasible. RD to continue to monitor as available.     [Severe Chronic Protein Calorie Malnutrition: Risk Assessment Completed 9/11]  - Wt Loss: > 10% in 6 months  - NFPE: Severe: orbital, clavicle; Moderate: buccal, temples, shoulders      Estimated energy needs:   Kcal: 30-40 kcal/kg = 4882-5930 kcal  Pro: 1.3-1.7 g pro/kg = 75-98 g pro  Fluids: 30-35ml/kg = 1015-6800 ml    Actual body weight (58.1kg) used to calculate energy needs due to pt's current body weight within % (86%) ideal body weight. Needs adjusted for malnutrition, clinical status.    Subjective:   "50 y/o M with HTN (not on meds) diagnosed with Gastric cancer 7/2024 s/p dx lap w/ peritoneal washings on 7/31, followed by [facility], on neoadjuvant chemo therapy (last session 8/28). Pt was sent here after seeing Dr. Carrasco in office for GI bleed now s/p EGD showing partial GOO due to gastric mass now s/p robotic distal gastrectomy with BR II (9/19)"    Visited pt at bedside on 9UR for follow up assessment. Current diet: Consistent Carbohydrate w/ No Evening Snack + Pureed. Pt is also receiving TPN via PICC as primary source of nutrition. Pt is currently s/p gastrectomy 9/19. States that he was previously on clear liquid diet, tolerating diet. Since then has been advanced. Discussed with pt about nutrition and foods to consume. Answered questions related to diet and nutrition. Educated/Discussed the importance to prioritize protein at meal times. Reviewed good sources of protein on the menu. Pt receptive and verbalizes understanding. Meds reviewed. miralax. Nutritionally Pertinent labs 9/23: Gluc: 158 (H). See Nutrition Recommendations below.     Previous Nutrition Diagnosis: Severe Chronic Malnutrition related to inadequate PO intake to meet increased physiological demand for nutrients as evidenced by Gastric CA    Active [ X ]  Resolved [   ]    Goal: Pt to meet >75% of estimated nutrition needs daily per course of hospitalization.    Recommendations:  1. Continue with PO diet as tolerated, defer diet texture and fluid consistency per team.   >>Recommend Ensure Max 2x/day [provides 150 kcals, 30g protein each]  2. As pt is receiving TPN via PICC, recommend continuing 245g Dextrose, 75g AA, 50g 20% Lipids to provide in total  1635Kcal, g protein, 2.9GIR of based on actual body weight (58.1kg), 23nonprotien kcal/kg, 1.3 grams protein/kg actual body weight (58.1kg)  >>Weekly lipid panel while on TPN, next 9/24  - hold lipid inj. if TG >400  >>>Daily BMP/Mg/Phos, fingersticks Q4-6hrs  - monitor lytes closely & replete outside TPN bag prn  >>>If pt is consuming >75% of meals, consider weaning TPN, 1/2 TPN before d/cing.  3. RDN will continue to monitor, reassess, and intervene as appropriate.     Education: Discussed with pt about nutrition and foods to consume. Answered questions related to diet and nutrition. Educated/Discussed the importance to prioritize protein at meal times. Reviewed good sources of protein on the menu. Pt receptive and verbalizes understanding.    Risk Level: High [ X ] Moderate [   ] Low [   ]
Patient is s/p EGD performed in Endoscopy      Esophagus  Normal esophagus.    Stomach  An ulcerated 5 cm mass with stigmata of recent bleeding of malignant appearance was found in the Lesser Curvature. The mass caused a partial obstruction. The scope could not traverse the lesion and the exam could not be finished. Endoscopic findings were suggestive of the known gastric malignancy/tumor, and is likely the source of anemia, given friability and oozing with minimal contact. Also causing partial gastric outlet obstruction.  Retained liquid and semi-solid food debris in stomach, aggressively suctioned.    Duodenum  Could not easily traverse into duodenum, and was not attempted due to friable nature and location of mass.    Plan:  Return to floor for further management  trend hgb  Ice Chips and Sips of Clears due to partial GOO  No plan for inpatient colonoscopy  Surgical planning per primary team    Thank you for allowing us to participate in the care of this patient. Please call us if you have any further questions or concerns    Venkatesh Ortega M.D.  Gastroenterology Fellow  GI Consult Weekday 7am-5pm Pager: [phone]  Weeknights/Weekend/Holiday Coverage: Please Call the  for contact info  Follow Up Questions welcome via [place] [employer] Teams Messenger

## 2024-09-23 NOTE — PROGRESS NOTE ADULT - SUBJECTIVE AND OBJECTIVE BOX
CC: Patient is a 49y old  Male who presents with a chief complaint of GI bleed (23 Sep 2024 09:02)      INTERVAL EVENTS: FREDO    SUBJECTIVE / INTERVAL HPI: Patient seen and examined at bedside.     ROS: negative unless otherwise stated above.    VITAL SIGNS:  Vital Signs Last 24 Hrs  T(C): 37.1 (23 Sep 2024 08:43), Max: 37.1 (23 Sep 2024 08:43)  T(F): 98.8 (23 Sep 2024 08:43), Max: 98.8 (23 Sep 2024 08:43)  HR: 85 (23 Sep 2024 08:43) (77 - 88)  BP: 130/87 (23 Sep 2024 08:43) (114/72 - 137/83)  BP(mean): 98 (22 Sep 2024 17:16) (98 - 98)  RR: 16 (23 Sep 2024 08:43) (15 - 18)  SpO2: 97% (23 Sep 2024 08:43) (97% - 99%)    Parameters below as of 23 Sep 2024 08:43  Patient On (Oxygen Delivery Method): room air          09-22-24 @ 07:01  -  09-23-24 @ 07:00  --------------------------------------------------------  IN: 2536 mL / OUT: 2350 mL / NET: 186 mL    09-23-24 @ 07:01  - 09-23-24 @ 12:31  --------------------------------------------------------  IN: 640 mL / OUT: 550 mL / NET: 90 mL        PHYSICAL EXAM:    General: NAD  HEENT: MMM  Neck: supple  Cardiovascular: +S1/S2; RRR  Respiratory: CTA B/L; no W/R/R  Gastrointestinal: soft, NT/ND  Extremities: WWP; no edema, clubbing or cyanosis  Vascular: 2+ radial, DP/PT pulses B/L  Neurological: AAOx3; no focal deficits    MEDICATIONS:  MEDICATIONS  (STANDING):  chlorhexidine 2% Cloths 1 Application(s) Topical <User Schedule>  heparin   Injectable 5000 Unit(s) SubCutaneous every 8 hours  influenza   Vaccine 0.5 milliLiter(s) IntraMuscular once  lipid, fat emulsion (Fish Oil and Plant Based) 20% Infusion 0.8606 Gm/kG/Day (20.83 mL/Hr) IV Continuous <Continuous>  Parenteral Nutrition - Adult 1 Each TPN Continuous <Continuous>  Parenteral Nutrition - Adult 1 Each TPN Continuous <Continuous>  polyethylene glycol 3350 17 Gram(s) Oral daily    MEDICATIONS  (PRN):  acetaminophen     Tablet .. 1000 milliGRAM(s) Oral every 6 hours PRN Mild Pain (1 - 3)  benzocaine/menthol Lozenge 1 Lozenge Oral every 4 hours PRN Sore Throat  HYDROmorphone  Injectable 0.2 milliGRAM(s) IV Push every 6 hours PRN breakthrough pain  naloxone Injectable 0.1 milliGRAM(s) IV Push every 3 minutes PRN For ANY of the following changes in patient status:  A. RR LESS THAN 10 breaths per minute, B. Oxygen saturation LESS THAN 90%, C. Sedation score of 6  ondansetron Injectable 4 milliGRAM(s) IV Push every 6 hours PRN Nausea and/or Vomiting  oxyCODONE    IR 5 milliGRAM(s) Oral every 6 hours PRN Severe Pain (7 - 10)  oxyCODONE    IR 2.5 milliGRAM(s) Oral every 6 hours PRN Moderate Pain (4 - 6)      ALLERGIES:  Allergies    No Known Drug Allergies  shellfish (Anaphylaxis)    Intolerances        LABS:                        10.5   12.52 )-----------( 362      ( 23 Sep 2024 05:30 )             33.9     09-23    140  |  103  |  10  ----------------------------<  158[H]  4.0   |  27  |  0.61    Ca    8.9      23 Sep 2024 05:30  Phos  4.1     09-23  Mg     1.9     09-23        Urinalysis Basic - ( 23 Sep 2024 05:30 )    Color: x / Appearance: x / SG: x / pH: x  Gluc: 158 mg/dL / Ketone: x  / Bili: x / Urobili: x   Blood: x / Protein: x / Nitrite: x   Leuk Esterase: x / RBC: x / WBC x   Sq Epi: x / Non Sq Epi: x / Bacteria: x      CAPILLARY BLOOD GLUCOSE          RADIOLOGY & ADDITIONAL TESTS: Reviewed. CC: Patient is a 49y old  Male who presents with a chief complaint of GI bleed (23 Sep 2024 09:02)      INTERVAL EVENTS: FREDO    SUBJECTIVE / INTERVAL HPI: Patient seen in the hallway during one of his walks. He has been tolerating his CLD, is still passing gas but has not had a BM yet. He expressed feeling more of an appetite than before and is looking forward to advancing oral diet. He has a positive outlook.    ROS: negative unless otherwise stated above.    VITAL SIGNS:  Vital Signs Last 24 Hrs  T(C): 37.1 (23 Sep 2024 08:43), Max: 37.1 (23 Sep 2024 08:43)  T(F): 98.8 (23 Sep 2024 08:43), Max: 98.8 (23 Sep 2024 08:43)  HR: 85 (23 Sep 2024 08:43) (77 - 88)  BP: 130/87 (23 Sep 2024 08:43) (114/72 - 137/83)  BP(mean): 98 (22 Sep 2024 17:16) (98 - 98)  RR: 16 (23 Sep 2024 08:43) (15 - 18)  SpO2: 97% (23 Sep 2024 08:43) (97% - 99%)    Parameters below as of 23 Sep 2024 08:43  Patient On (Oxygen Delivery Method): room air          09-22-24 @ 07:01  -  09-23-24 @ 07:00  --------------------------------------------------------  IN: 2536 mL / OUT: 2350 mL / NET: 186 mL    09-23-24 @ 07:01  -  09-23-24 @ 12:31  --------------------------------------------------------  IN: 640 mL / OUT: 550 mL / NET: 90 mL        PHYSICAL EXAM:    General: NAD  HEENT: MMM  Neck: supple  Cardiovascular: +S1/S2; RRR  Respiratory: CTA B/L; no W/R/R  Gastrointestinal: soft, NT/ND  Extremities: WWP; no edema, clubbing or cyanosis  Vascular: 2+ radial, DP/PT pulses B/L  Neurological: AAOx3; no focal deficits    MEDICATIONS:  MEDICATIONS  (STANDING):  chlorhexidine 2% Cloths 1 Application(s) Topical <User Schedule>  heparin   Injectable 5000 Unit(s) SubCutaneous every 8 hours  influenza   Vaccine 0.5 milliLiter(s) IntraMuscular once  lipid, fat emulsion (Fish Oil and Plant Based) 20% Infusion 0.8606 Gm/kG/Day (20.83 mL/Hr) IV Continuous <Continuous>  Parenteral Nutrition - Adult 1 Each TPN Continuous <Continuous>  Parenteral Nutrition - Adult 1 Each TPN Continuous <Continuous>  polyethylene glycol 3350 17 Gram(s) Oral daily    MEDICATIONS  (PRN):  acetaminophen     Tablet .. 1000 milliGRAM(s) Oral every 6 hours PRN Mild Pain (1 - 3)  benzocaine/menthol Lozenge 1 Lozenge Oral every 4 hours PRN Sore Throat  HYDROmorphone  Injectable 0.2 milliGRAM(s) IV Push every 6 hours PRN breakthrough pain  naloxone Injectable 0.1 milliGRAM(s) IV Push every 3 minutes PRN For ANY of the following changes in patient status:  A. RR LESS THAN 10 breaths per minute, B. Oxygen saturation LESS THAN 90%, C. Sedation score of 6  ondansetron Injectable 4 milliGRAM(s) IV Push every 6 hours PRN Nausea and/or Vomiting  oxyCODONE    IR 5 milliGRAM(s) Oral every 6 hours PRN Severe Pain (7 - 10)  oxyCODONE    IR 2.5 milliGRAM(s) Oral every 6 hours PRN Moderate Pain (4 - 6)      ALLERGIES:  Allergies    No Known Drug Allergies  shellfish (Anaphylaxis)    Intolerances        LABS:                        10.5   12.52 )-----------( 362      ( 23 Sep 2024 05:30 )             33.9     09-23    140  |  103  |  10  ----------------------------<  158[H]  4.0   |  27  |  0.61    Ca    8.9      23 Sep 2024 05:30  Phos  4.1     09-23  Mg     1.9     09-23        Urinalysis Basic - ( 23 Sep 2024 05:30 )    Color: x / Appearance: x / SG: x / pH: x  Gluc: 158 mg/dL / Ketone: x  / Bili: x / Urobili: x   Blood: x / Protein: x / Nitrite: x   Leuk Esterase: x / RBC: x / WBC x   Sq Epi: x / Non Sq Epi: x / Bacteria: x      CAPILLARY BLOOD GLUCOSE          RADIOLOGY & ADDITIONAL TESTS: Reviewed. CC: Patient is a 49y old  Male who presents with a chief complaint of GI bleed (23 Sep 2024 09:02)      INTERVAL EVENTS: FREDO    SUBJECTIVE / INTERVAL HPI: Patient seen in the hallway during one of his walks. He has been tolerating his CLD, is still passing gas but has not had a BM yet. He expressed feeling more of an appetite than before and is looking forward to advancing oral diet. He has a positive outlook.    ROS: negative unless otherwise stated above.    VITAL SIGNS:  Vital Signs Last 24 Hrs  T(C): 37.1 (23 Sep 2024 08:43), Max: 37.1 (23 Sep 2024 08:43)  T(F): 98.8 (23 Sep 2024 08:43), Max: 98.8 (23 Sep 2024 08:43)  HR: 85 (23 Sep 2024 08:43) (77 - 88)  BP: 130/87 (23 Sep 2024 08:43) (114/72 - 137/83)  BP(mean): 98 (22 Sep 2024 17:16) (98 - 98)  RR: 16 (23 Sep 2024 08:43) (15 - 18)  SpO2: 97% (23 Sep 2024 08:43) (97% - 99%)    Parameters below as of 23 Sep 2024 08:43  Patient On (Oxygen Delivery Method): room air          09-22-24 @ 07:01  -  09-23-24 @ 07:00  --------------------------------------------------------  IN: 2536 mL / OUT: 2350 mL / NET: 186 mL    09-23-24 @ 07:01  -  09-23-24 @ 12:31  --------------------------------------------------------  IN: 640 mL / OUT: 550 mL / NET: 90 mL        PHYSICAL EXAM:    General: NAD, standing and walking in hallway  HEENT: normal sclera, glasses  Respiratory: speaking full setntences, normal RR, no acc musc use  Extremities: moving all extremities  Vascular: PIV, Picc  psych: calm, conversational  skin: no jaundice    MEDICATIONS:  MEDICATIONS  (STANDING):  chlorhexidine 2% Cloths 1 Application(s) Topical <User Schedule>  heparin   Injectable 5000 Unit(s) SubCutaneous every 8 hours  influenza   Vaccine 0.5 milliLiter(s) IntraMuscular once  lipid, fat emulsion (Fish Oil and Plant Based) 20% Infusion 0.8606 Gm/kG/Day (20.83 mL/Hr) IV Continuous <Continuous>  Parenteral Nutrition - Adult 1 Each TPN Continuous <Continuous>  Parenteral Nutrition - Adult 1 Each TPN Continuous <Continuous>  polyethylene glycol 3350 17 Gram(s) Oral daily    MEDICATIONS  (PRN):  acetaminophen     Tablet .. 1000 milliGRAM(s) Oral every 6 hours PRN Mild Pain (1 - 3)  benzocaine/menthol Lozenge 1 Lozenge Oral every 4 hours PRN Sore Throat  HYDROmorphone  Injectable 0.2 milliGRAM(s) IV Push every 6 hours PRN breakthrough pain  naloxone Injectable 0.1 milliGRAM(s) IV Push every 3 minutes PRN For ANY of the following changes in patient status:  A. RR LESS THAN 10 breaths per minute, B. Oxygen saturation LESS THAN 90%, C. Sedation score of 6  ondansetron Injectable 4 milliGRAM(s) IV Push every 6 hours PRN Nausea and/or Vomiting  oxyCODONE    IR 5 milliGRAM(s) Oral every 6 hours PRN Severe Pain (7 - 10)  oxyCODONE    IR 2.5 milliGRAM(s) Oral every 6 hours PRN Moderate Pain (4 - 6)      ALLERGIES:  Allergies    No Known Drug Allergies  shellfish (Anaphylaxis)    Intolerances        LABS:                        10.5   12.52 )-----------( 362      ( 23 Sep 2024 05:30 )             33.9     09-23    140  |  103  |  10  ----------------------------<  158[H]  4.0   |  27  |  0.61    Ca    8.9      23 Sep 2024 05:30  Phos  4.1     09-23  Mg     1.9     09-23        Urinalysis Basic - ( 23 Sep 2024 05:30 )    Color: x / Appearance: x / SG: x / pH: x  Gluc: 158 mg/dL / Ketone: x  / Bili: x / Urobili: x   Blood: x / Protein: x / Nitrite: x   Leuk Esterase: x / RBC: x / WBC x   Sq Epi: x / Non Sq Epi: x / Bacteria: x      CAPILLARY BLOOD GLUCOSE          RADIOLOGY & ADDITIONAL TESTS: Reviewed.

## 2024-09-24 ENCOUNTER — TRANSCRIPTION ENCOUNTER (OUTPATIENT)
Age: 49
End: 2024-09-24

## 2024-09-24 VITALS
HEART RATE: 86 BPM | OXYGEN SATURATION: 99 % | TEMPERATURE: 99 F | DIASTOLIC BLOOD PRESSURE: 78 MMHG | SYSTOLIC BLOOD PRESSURE: 128 MMHG | RESPIRATION RATE: 17 BRPM

## 2024-09-24 LAB — SURGICAL PATHOLOGY STUDY: SIGNIFICANT CHANGE UP

## 2024-09-24 PROCEDURE — 84478 ASSAY OF TRIGLYCERIDES: CPT

## 2024-09-24 PROCEDURE — S2900: CPT

## 2024-09-24 PROCEDURE — 83540 ASSAY OF IRON: CPT

## 2024-09-24 PROCEDURE — 85025 COMPLETE CBC W/AUTO DIFF WBC: CPT

## 2024-09-24 PROCEDURE — 99232 SBSQ HOSP IP/OBS MODERATE 35: CPT | Mod: GC

## 2024-09-24 PROCEDURE — 71045 X-RAY EXAM CHEST 1 VIEW: CPT

## 2024-09-24 PROCEDURE — 82962 GLUCOSE BLOOD TEST: CPT

## 2024-09-24 PROCEDURE — C1889: CPT

## 2024-09-24 PROCEDURE — 86850 RBC ANTIBODY SCREEN: CPT

## 2024-09-24 PROCEDURE — 88305 TISSUE EXAM BY PATHOLOGIST: CPT

## 2024-09-24 PROCEDURE — 36573 INSJ PICC RS&I 5 YR+: CPT

## 2024-09-24 PROCEDURE — 84134 ASSAY OF PREALBUMIN: CPT

## 2024-09-24 PROCEDURE — 85730 THROMBOPLASTIN TIME PARTIAL: CPT

## 2024-09-24 PROCEDURE — 86900 BLOOD TYPING SEROLOGIC ABO: CPT

## 2024-09-24 PROCEDURE — 88342 IMHCHEM/IMCYTCHM 1ST ANTB: CPT

## 2024-09-24 PROCEDURE — 82728 ASSAY OF FERRITIN: CPT

## 2024-09-24 PROCEDURE — 83550 IRON BINDING TEST: CPT

## 2024-09-24 PROCEDURE — 85027 COMPLETE CBC AUTOMATED: CPT

## 2024-09-24 PROCEDURE — 86901 BLOOD TYPING SEROLOGIC RH(D): CPT

## 2024-09-24 PROCEDURE — 83735 ASSAY OF MAGNESIUM: CPT

## 2024-09-24 PROCEDURE — 84100 ASSAY OF PHOSPHORUS: CPT

## 2024-09-24 PROCEDURE — C9399: CPT

## 2024-09-24 PROCEDURE — 36415 COLL VENOUS BLD VENIPUNCTURE: CPT

## 2024-09-24 PROCEDURE — 88309 TISSUE EXAM BY PATHOLOGIST: CPT

## 2024-09-24 PROCEDURE — 82248 BILIRUBIN DIRECT: CPT

## 2024-09-24 PROCEDURE — 80048 BASIC METABOLIC PNL TOTAL CA: CPT

## 2024-09-24 PROCEDURE — 80053 COMPREHEN METABOLIC PANEL: CPT

## 2024-09-24 PROCEDURE — 85610 PROTHROMBIN TIME: CPT

## 2024-09-24 RX ORDER — FERROUS SULFATE 325(65) MG
1 TABLET ORAL
Qty: 6 | Refills: 0
Start: 2024-09-24 | End: 2024-10-07

## 2024-09-24 RX ORDER — PANTOPRAZOLE SODIUM 40 MG/1
40 TABLET, DELAYED RELEASE ORAL
Qty: 0 | Refills: 0 | DISCHARGE
Start: 2024-09-24

## 2024-09-24 RX ADMIN — Medication 5000 UNIT(S): at 06:22

## 2024-09-24 RX ADMIN — CHLORHEXIDINE GLUCONATE ORAL RINSE 1 APPLICATION(S): 1.2 SOLUTION DENTAL at 06:22

## 2024-09-24 NOTE — PROGRESS NOTE ADULT - PROVIDER SPECIALTY LIST ADULT
Internal Medicine
Surgery
Hospitalist
Internal Medicine
Surgery
Internal Medicine
Surgery
Internal Medicine
Internal Medicine
Surgery

## 2024-09-24 NOTE — PROGRESS NOTE ADULT - ASSESSMENT
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 Pt was sent here after seeing Dr. 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 Pt was sent here after seeing Dr. Carrasco in office for GI bleed now s/p EGD showing partial GOO due to gastric mass now s/p robotic distal gastrectomy with BR II (9/19)    d/c home  stop TPN, remove PICC line  Pureed + ensure max  PO pain regimen  OOBA/SCD/IS/SQH

## 2024-09-24 NOTE — PROGRESS NOTE ADULT - ATTENDING COMMENTS
48 y/o M with HTN (was on low dose Losartan previously but was taken off for 3 months) diagnosed with Gastric cancer 7/2024 s/p dx lap w/ peritoneal washings on 7/31, followed by Dr. Pereira, on neoadjuvant chemo therapy (last session 8/28). Patient reports he first was diagnosed with gastric cancer in July 2024 when he a a syncopal event, found to be anemic with an EGD that revealed the mass. He has since had two syncopal events (7/6 & 8/14) secondary to severe anemia; both of which were about 2 weeks after chemotherapy. He endorses approx 30 lbs unintentional weight loss since diagnosis. Most recently on 9/7/24, admitted to OSH for syncope, hgb 6.9 s/p  2 units PRBC transfusion. Pt was sent here after seeing Surgical Oncologist Dr. Carrasco today in office with complaints of black bowel movement the day before-     CC: Pt seen wEdil Echols this am. Pt tolerating regular diet, had BMs, d/c home today     # RA/distal Gastrectomy with gastrojejunostomy creation (9/19- )POD#4  tolerating regular diet, d/c TPN and remove PICC line upon d/c home   - advance diet per surgery team   - pain management as per primary team.     #Blood loss Anemia/OSIRIS-   - Patient with evidence of OSIRIS in July 2024 but labs on this admission are consistent with low-normal iron stores. Tsat 11%  - iron sucrose is out of stock -given ferrous gluconate- 125mg per day -with aim for 8 doses total ( started 9/12-9/19)  would hold oral iron as in-patient, to give on discharge ( iron work best when given three times per week along with stool softener.   - His anemia is likely acute blood loss secondary to his gastric cancer  - no more bleeding noted, hb has been stable around 9 - 9.2 on 9/14, 9.9 on 9/17-10.5 on 9/23   - GI f/u appreciated, EGD (9/11)reviewed 5cm gastric mass at lesser curvature w/ partial GOO, scope cannot to traverse- now on puree diet and tolerating .      #Nutrition/severe protein calorie malnutrition  # weight loss   - PICC line LUE placed by IR (9/10), to be removed upon d/c home today 9/24  - tolerated diet   # incentive spirometer , ambulation as tolerated.     Thank you for having us participating with his care. Medicine will follow pt with you.
48 y/o M with HTN (was on low dose Losartan previously but was taken off for 3 months) diagnosed with Gastric cancer 7/2024 s/p dx lap w/ peritoneal washings on 7/31, followed by Dr. Pereira, on neoadjuvant chemo therapy (last session 8/28). Patient reports he first was diagnosed with gastric cancer in July 2024 when he a a syncopal event, found to be anemic with an EGD that revealed the mass. He has since had two syncopal events (7/6 & 8/14) secondary to severe anemia; both of which were about 2 weeks after chemotherapy. He endorses approx 30 lbs unintentional weight loss since diagnosis. Most recently on 9/7/24, admitted to OSH for syncope, hgb 6.9 s/p  2 units PRBC transfusion. Pt was sent here after seeing Surgical Oncologist Dr. Carrasco today in office with complaints of black bowel movement the day before-       Chart/labs reviewed. Pt interviewed with Dr. Martinez.   on TPN, puree diet, in great spirit.  tolerating iv iron therapy       #Pre-op for distal gastrectomy   - Patient has excellent METS - works as a   - RCRI = 3.9 % 30-day risk of death, MI, or cardiac arrest  - JARAMILLO = 0.0 % Risk of myocardial infarction or cardiac arrest, intraoperatively or up to 30 days post-op  - Patient is low risk for intermediate risk procedure    #BLood loss Anemia/OSIRIS   - Patient with evidence of OSIRIS in July 2024 but labs on this admission are consistent with low-normal iron stores. Tsat 11%  - iron sucrose is out of stock -given ferrous gluconate- 125 per day -with aim for 8 doses total ( started 9/12- ( day 2/8-   would hold oral iron as in-patient, to give on discharge ( iron work best when given three times per week along with stool softener.   - His anemia is likely acute blood loss secondary to his gastric cancer  - no more bleeding noted, hb has been stable around 9   - GI f/u appreciated, EGD (9/11)reviewed 5cm gastric mass at lesser curvature w/ partial GOO, scope cannot to traverse.       #Nutrition/severe protein calorie malnutrition  # weight loss   - PICC line LUE placed by IR (9/10)   - on TPN now.   - puree diet as tolerated.    # incentive spirometer , ambulation as tolerated.     Thank you for having us participating with his care. Medicine will follow pt with you.
50 y/o M with HTN (was on low dose Losartan previously but was taken off for 3 months) diagnosed with Gastric cancer 7/2024 s/p dx lap w/ peritoneal washings on 7/31, followed by Dr. Pereira, on neoadjuvant chemo therapy (last session 8/28). Patient reports he first was diagnosed with gastric cancer in July 2024 when he a a syncopal event, found to be anemic with an EGD that revealed the mass. He has since had two syncopal events (7/6 & 8/14) secondary to severe anemia; both of which were about 2 weeks after chemotherapy. He endorses approx 30 lbs unintentional weight loss since diagnosis. Most recently on 9/7/24, admitted to OSH for syncope, hgb 6.9 s/p  2 units PRBC transfusion. Pt was sent here after seeing Surgical Oncologist Dr. Carrasco today in office with complaints of black bowel movement the day before-       Chart/labs reviewed. Pt interviewed with Dr. Martinez.   on TPN, puree diet, in great spirit.  awake, alert, ambulatory  RRR,   good air entry bilaterally  no edema noted on bilateral lower ext.   neuro - aao x 3 non focal.       #Pre-op for distal gastrectomy   - Patient has excellent METS - works as a   - RCRI = 3.9 % 30-day risk of death, MI, or cardiac arrest  - JARAMILLO = 0.0 % Risk of myocardial infarction or cardiac arrest, intraoperatively or up to 30 days post-op  - Patient is low risk for intermediate risk procedure    #BLood loss Anemia/OSIRIS   - Patient with evidence of OSIRIS in July 2024 but labs on this admission are consistent with low-normal iron stores. Tsat 11%  - iron sucrose is out of stock -given ferrous gluconate- 125 per day -with aim for 8 doses total ( started 9/12- ( day 2/8-   would hold oral iron as in-patient, to give on discharge ( iron work best when given three times per week along with stool softener.   - His anemia is likely acute blood loss secondary to his gastric cancer  - no more bleeding noted, hb has been stable around 9   - GI f/u appreciated, EGD (9/11)reviewed 5cm gastric mass at lesser curvature w/ partial GOO, scope cannot to traverse.       #Nutrition/severe protein calorie malnutrition  # weight loss   - PICC line LUE placed by IR (9/10)   - on TPN now.   - puree diet as tolerated.    # incentive spirometer , ambulation as tolerated.     Thank you for having us participating with his care. Medicine will follow pt with you.
50 y/o M with HTN (was on low dose Losartan previously but was taken off for 3 months) diagnosed with Gastric cancer 7/2024 s/p dx lap w/ peritoneal washings on 7/31, followed by Dr. Pereira, on neoadjuvant chemo therapy (last session 8/28). Patient reports he first was diagnosed with gastric cancer in July 2024 when he a a syncopal event, found to be anemic with an EGD that revealed the mass. He has since had two syncopal events (7/6 & 8/14) secondary to severe anemia; both of which were about 2 weeks after chemotherapy. He endorses approx 30 lbs unintentional weight loss since diagnosis. Most recently on 9/7/24, admitted to OSH for syncope, hgb 6.9 s/p  2 units PRBC transfusion. Pt was sent here after seeing Surgical Oncologist Dr. Carrasco today in office with complaints of black bowel movement the day before-     pt seen with Dr. Echols  tolerating diet and having brown bm. no bleeding.   in great spirit    TPN on going,   RRR, good air entry bilaterally.   no edema noted on lower ext.   no new lab today.   hb 9.9 on 9/17    #Pre-op for distal gastrectomy   - Patient has excellent METS - works as a   - RCRI = 3.9 % 30-day risk of death, MI, or cardiac arrest  - JARAMILLO = 0.0 % Risk of myocardial infarction or cardiac arrest, intraoperatively or up to 30 days post-op  - Patient is low risk for intermediate risk procedure    #BLood loss Anemia/OSIRIS   - Patient with evidence of OSIRIS in July 2024 but labs on this admission are consistent with low-normal iron stores. Tsat 11%  - iron sucrose is out of stock -given ferrous gluconate- 125 per day -with aim for 8 doses total ( started 9/12- ( day 2/8-   would hold oral iron as in-patient, to give on discharge ( iron work best when given three times per week along with stool softener.   - His anemia is likely acute blood loss secondary to his gastric cancer  - no more bleeding noted, hb has been stable around 9 - 9.2 on 9/14, 9.9 on 9/17-   - GI f/u appreciated, EGD (9/11)reviewed 5cm gastric mass at lesser curvature w/ partial GOO, scope cannot to traverse- now on puree diet and tolerating .      #Nutrition/severe protein calorie malnutrition  # weight loss   - PICC line LUE placed by IR (9/10)   - on TPN now.   - puree diet as tolerated.    # incentive spirometer , ambulation as tolerated.     Thank you for having us participating with his care. Medicine will follow pt with you.  pt is medically optimized for anticipated surgery 9/18- will follow.
50 y/o M with HTN (was on low dose Losartan previously but was taken off for 3 months) diagnosed with Gastric cancer 7/2024 s/p dx lap w/ peritoneal washings on 7/31, followed by Dr. Pereira, on neoadjuvant chemo therapy (last session 8/28). Patient reports he first was diagnosed with gastric cancer in July 2024 when he a a syncopal event, found to be anemic with an EGD that revealed the mass. He has since had two syncopal events (7/6 & 8/14) secondary to severe anemia; both of which were about 2 weeks after chemotherapy. He endorses approx 30 lbs unintentional weight loss since diagnosis. Most recently on 9/7/24, admitted to OSH for syncope, hgb 6.9 s/p  2 units PRBC transfusion. Pt was sent here after seeing Surgical Oncologist Dr. Carrasco today in office with complaints of black bowel movement the day before-     pt seen with Dr. Martinez  ambulating in the hallway, TPN on going,   no black stool, no vomiting, in great spirit.   no edema noted on lower ext.     #Pre-op for distal gastrectomy   - Patient has excellent METS - works as a   - RCRI = 3.9 % 30-day risk of death, MI, or cardiac arrest  - JARAMILLO = 0.0 % Risk of myocardial infarction or cardiac arrest, intraoperatively or up to 30 days post-op  - Patient is low risk for intermediate risk procedure    #BLood loss Anemia/OSIRIS   - Patient with evidence of OSIRIS in July 2024 but labs on this admission are consistent with low-normal iron stores. Tsat 11%  - iron sucrose is out of stock -given ferrous gluconate- 125 per day -with aim for 8 doses total ( started 9/12- ( day 2/8-   would hold oral iron as in-patient, to give on discharge ( iron work best when given three times per week along with stool softener.   - His anemia is likely acute blood loss secondary to his gastric cancer  - no more bleeding noted, hb has been stable around 9 - 9.2 on 9/14  - GI f/u appreciated, EGD (9/11)reviewed 5cm gastric mass at lesser curvature w/ partial GOO, scope cannot to traverse.         #Nutrition/severe protein calorie malnutrition  # weight loss   - PICC line LUE placed by IR (9/10)   - on TPN now.   - puree diet as tolerated.    # incentive spirometer , ambulation as tolerated.     Thank you for having us participating with his care. Medicine will follow pt with you.
50 y/o M with HTN (was on low dose Losartan previously but was taken off for 3 months) diagnosed with Gastric cancer 7/2024 s/p dx lap w/ peritoneal washings on 7/31, followed by Dr. Pereira, on neoadjuvant chemo therapy (last session 8/28). Patient reports he first was diagnosed with gastric cancer in July 2024 when he a a syncopal event, found to be anemic with an EGD that revealed the mass. He has since had two syncopal events (7/6 & 8/14) secondary to severe anemia; both of which were about 2 weeks after chemotherapy. He endorses approx 30 lbs unintentional weight loss since diagnosis. Most recently on 9/7/24, admitted to OSH for syncope, hgb 6.9 s/p  2 units PRBC transfusion. Pt was sent here after seeing Surgical Oncologist Dr. Carrasco today in office with complaints of black bowel movement yesterday. Denies fever, chills, n/v/d, cp, sob, hematemesis. Denies previous colonoscopy. (10 Sep 2024 13:29)    Chart/labs reviewed. Pt interviewed with Dr. Martinez.   Gen: Thin man AAOx3 in good spirits Heart normal heart sounds lungs clear abd ND/NT ext: no edema    #Pre-op for distal gastrectomy   - Patient has excellent METS - works as a   - RCRI = 3.9 % 30-day risk of death, MI, or cardiac arrest  - JARAMILLO = 0.0 % Risk of myocardial infarction or cardiac arrest, intraoperatively or up to 30 days post-op  - Patient is low risk for intermediate risk procedure    #BLood loss Anemia/OSIRIS   - Patient with evidence of OSIRIS in July 2024 but labs on this admission are consistent with low-normal iron stores. Tsat 11%  - Will start IV iron sucrose 500mg IV daily x 2 doses total 1gm then PO iron sulfate 325mg PO daily  - His anemia is likely acute blood loss secondary to his gastric cancer  - trend CBC daily Hgb 9.7 on adm ( 9/10)-> 9 (9/11)  - keep active T&S x2 initially then q 72hr   - GI f/u appreciated, EGD (9/11)reviewed 5cm gastric mass at lesser curvature w/ partial GOO, scope cannot to traverse.   - no plan for colonoscopy inpatient   - resume ice chip and clear liquid diet     #Nutrition/severe protein calorie malnutrition  # weight loss   - PICC line LUE placed by IR (9/10)   - as per surgery will plan for TPN  - please trend electrolytes daily    Thank you for having us participating with his care. Medicine will follow pt with you.    Spoke w. Surgeon Dr. Carrasco this am
48 y/o M with HTN (was on low dose Losartan previously but was taken off for 3 months) diagnosed with Gastric cancer 7/2024 s/p dx lap w/ peritoneal washings on 7/31, followed by Dr. Pereira, on neoadjuvant chemo therapy (last session 8/28). Patient reports he first was diagnosed with gastric cancer in July 2024 when he a a syncopal event, found to be anemic with an EGD that revealed the mass. He has since had two syncopal events (7/6 & 8/14) secondary to severe anemia; both of which were about 2 weeks after chemotherapy. He endorses approx 30 lbs unintentional weight loss since diagnosis. Most recently on 9/7/24, admitted to OSH for syncope, hgb 6.9 s/p  2 units PRBC transfusion. Pt was sent here after seeing Surgical Oncologist Dr. Carrasco today in office with complaints of black bowel movement the day before-     pt seen with Dr. Echols. passed flatus and had BM. Tolerating CLD.   PE: lap scars intact, normoactive BS, NT    RA/distal Gastrectomy with gastrojejunostomy creation (9/19- )POD#2   tolerating CLD/TPN  pain management as per primary team.     #Blood loss Anemia/OSIRIS-   - Patient with evidence of OSIRIS in July 2024 but labs on this admission are consistent with low-normal iron stores. Tsat 11%  - iron sucrose is out of stock -given ferrous gluconate- 125mg per day -with aim for 8 doses total ( started 9/12-9/19)  would hold oral iron as in-patient, to give on discharge ( iron work best when given three times per week along with stool softener.   - His anemia is likely acute blood loss secondary to his gastric cancer  - no more bleeding noted, hb has been stable around 9 - 9.2 on 9/14, 9.9 on 9/17-   - GI f/u appreciated, EGD (9/11)reviewed 5cm gastric mass at lesser curvature w/ partial GOO, scope cannot to traverse- now on puree diet and tolerating .      #Nutrition/severe protein calorie malnutrition  # weight loss   - PICC line LUE placed by IR (9/10)   - on TPN now.   - CLD per surgery/advance diet per surgery   # incentive spirometer , ambulation as tolerated.     Thank you for having us participating with his care. Medicine will follow pt with you.
48 y/o M with HTN (was on low dose Losartan previously but was taken off for 3 months) diagnosed with Gastric cancer 7/2024 s/p dx lap w/ peritoneal washings on 7/31, followed by Dr. Pereira, on neoadjuvant chemo therapy (last session 8/28). Patient reports he first was diagnosed with gastric cancer in July 2024 when he a a syncopal event, found to be anemic with an EGD that revealed the mass. He has since had two syncopal events (7/6 & 8/14) secondary to severe anemia; both of which were about 2 weeks after chemotherapy. He endorses approx 30 lbs unintentional weight loss since diagnosis. Most recently on 9/7/24, admitted to OSH for syncope, hgb 6.9 s/p  2 units PRBC transfusion. Pt was sent here after seeing Surgical Oncologist Dr. Carrasco today in office with complaints of black bowel movement the day before-     pt seen with Dr. Onesimo mirt out, not passing gas yet  has PCA analgesia not using much.   TPN on going,   bowel sound quiet.       Gastrectomy, distal, robot-assisted, laparoscopic, with gastrojejunostomy creation 9/19-    pod # 1  await return of bowel  encouraged ambulation  pain management as per primary team.     #BLood loss Anemia/OSIRIS-   - Patient with evidence of OSIRIS in July 2024 but labs on this admission are consistent with low-normal iron stores. Tsat 11%  - iron sucrose is out of stock -given ferrous gluconate- 125 per day -with aim for 8 doses total ( started 9/12- ( day 2/8-   would hold oral iron as in-patient, to give on discharge ( iron work best when given three times per week along with stool softener.   - His anemia is likely acute blood loss secondary to his gastric cancer  - no more bleeding noted, hb has been stable around 9 - 9.2 on 9/14, 9.9 on 9/17-   - GI f/u appreciated, EGD (9/11)reviewed 5cm gastric mass at lesser curvature w/ partial GOO, scope cannot to traverse- now on puree diet and tolerating .      #Nutrition/severe protein calorie malnutrition  # weight loss   - PICC line LUE placed by IR (9/10)   - on TPN now.   - npo   # incentive spirometer , ambulation as tolerated.     Thank you for having us participating with his care. Medicine will follow pt with you.    Dr. Weston Finn covering 9/21-
48 y/o M with HTN (was on low dose Losartan previously but was taken off for 3 months) diagnosed with Gastric cancer 7/2024 s/p dx lap w/ peritoneal washings on 7/31, followed by Dr. Pereira, on neoadjuvant chemo therapy (last session 8/28). Patient reports he first was diagnosed with gastric cancer in July 2024 when he a a syncopal event, found to be anemic with an EGD that revealed the mass. He has since had two syncopal events (7/6 & 8/14) secondary to severe anemia; both of which were about 2 weeks after chemotherapy. He endorses approx 30 lbs unintentional weight loss since diagnosis. Most recently on 9/7/24, admitted to OSH for syncope, hgb 6.9 s/p  2 units PRBC transfusion. Pt was sent here after seeing Surgical Oncologist Dr. Carrasco today in office with complaints of black bowel movement the day before-     CC: Pt seen wEdil Echols this am. Pt tolerating CLD, ambulatory, in good spirits.    # RA/distal Gastrectomy with gastrojejunostomy creation (9/19- )POD#3  tolerating CLD/TPN  - advance diet per surgery team   - pain management as per primary team.     #Blood loss Anemia/OSIRIS-   - Patient with evidence of OSIRIS in July 2024 but labs on this admission are consistent with low-normal iron stores. Tsat 11%  - iron sucrose is out of stock -given ferrous gluconate- 125mg per day -with aim for 8 doses total ( started 9/12-9/19)  would hold oral iron as in-patient, to give on discharge ( iron work best when given three times per week along with stool softener.   - His anemia is likely acute blood loss secondary to his gastric cancer  - no more bleeding noted, hb has been stable around 9 - 9.2 on 9/14, 9.9 on 9/17-10.5 on 9/23   - GI f/u appreciated, EGD (9/11)reviewed 5cm gastric mass at lesser curvature w/ partial GOO, scope cannot to traverse- now on puree diet and tolerating .      #Nutrition/severe protein calorie malnutrition  # weight loss   - PICC line LUE placed by IR (9/10)   - on TPN now.   - CLD per surgery/advance diet per surgery   # incentive spirometer , ambulation as tolerated.     Thank you for having us participating with his care. Medicine will follow pt with you.
48 y/o M with HTN (was on low dose Losartan previously but was taken off for 3 months) diagnosed with Gastric cancer 7/2024 s/p dx lap w/ peritoneal washings on 7/31, followed by Dr. Pereira, on neoadjuvant chemo therapy (last session 8/28). Patient reports he first was diagnosed with gastric cancer in July 2024 when he a a syncopal event, found to be anemic with an EGD that revealed the mass. He has since had two syncopal events (7/6 & 8/14) secondary to severe anemia; both of which were about 2 weeks after chemotherapy. He endorses approx 30 lbs unintentional weight loss since diagnosis. Most recently on 9/7/24, admitted to OSH for syncope, hgb 6.9 s/p  2 units PRBC transfusion. Pt was sent here after seeing Surgical Oncologist Dr. Carrasco today in office with complaints of black bowel movement the day before-     pt seen with Dr. Cadena  tolerating diet and having brown bm. no bleeding. Hb 9.9    TPN on going,   RRR, good air entry bilaterally.   no edema noted on lower ext.     #Pre-op for distal gastrectomy   - Patient has excellent METS - works as a   - RCRI = 3.9 % 30-day risk of death, MI, or cardiac arrest  - JARAMILLO = 0.0 % Risk of myocardial infarction or cardiac arrest, intraoperatively or up to 30 days post-op  - Patient is low risk for intermediate risk procedure    #BLood loss Anemia/OSIRIS   - Patient with evidence of OSIRIS in July 2024 but labs on this admission are consistent with low-normal iron stores. Tsat 11%  - iron sucrose is out of stock -given ferrous gluconate- 125 per day -with aim for 8 doses total ( started 9/12- ( day 2/8-   would hold oral iron as in-patient, to give on discharge ( iron work best when given three times per week along with stool softener.   - His anemia is likely acute blood loss secondary to his gastric cancer  - no more bleeding noted, hb has been stable around 9 - 9.2 on 9/14, 9.9 on 9/17-   - GI f/u appreciated, EGD (9/11)reviewed 5cm gastric mass at lesser curvature w/ partial GOO, scope cannot to traverse- now on puree diet and tolerating .      #Nutrition/severe protein calorie malnutrition  # weight loss   - PICC line LUE placed by IR (9/10)   - on TPN now.   - puree diet as tolerated.    # incentive spirometer , ambulation as tolerated.     Thank you for having us participating with his care. Medicine will follow pt with you. dw primary team.

## 2024-09-24 NOTE — DISCHARGE NOTE NURSING/CASE MANAGEMENT/SOCIAL WORK - NSDCFUADDAPPT_GEN_ALL_CORE_FT
Please schedule a follow-up appointment with Dr. Carrasco within 1-2 weeks of discharge from the hospital. You  may reach his office at [phone] at your earliest convenience.    You have a series of scheduled chemo treatment appointments at [address] [place] [address] on 9/13, 9/26 and 9/27.

## 2024-09-24 NOTE — PROGRESS NOTE ADULT - REASON FOR ADMISSION
GI bleed

## 2024-09-24 NOTE — PROGRESS NOTE ADULT - ASSESSMENT
50 y/o M with HTN (not on meds) diagnosed with Gastric cancer 7/2024on chemotherapy who was admitted to [facility] for evaluation of his gastric cancer. Medicine consulted for optimization prior to surgery. now s/p gastrectomy POD 5. Going home today.    # RA/distal Gastrectomy with gastrojejunostomy creation (9/19- )POD#4  tolerating regular diet, d/c TPN and remove PICC line upon d/c home   - advance diet per surgery team , pt had solid food today  - pain management as per primary team.     #Blood loss Anemia/OSIRIS-   - Patient with evidence of OSIRIS in July 2024 but labs on this admission are consistent with low-normal iron stores. Tsat 11%  - iron sucrose is out of stock -given ferrous gluconate- 125mg per day -with aim for 8 doses total ( started 9/12-9/19)  would hold oral iron as in-patient, to give on discharge ( iron work best when given three times per week along with stool softener.   - His anemia is likely acute blood loss secondary to his gastric cancer  - no more bleeding noted, hb has been stable     #Nutrition/severe protein calorie malnutrition  # weight loss   - PICC line LUE placed by IR (9/10), to be removed upon d/c home today 9/24  - tolerated diet

## 2024-09-24 NOTE — PROGRESS NOTE ADULT - SUBJECTIVE AND OBJECTIVE BOX
CC: Patient is a 49y old  Male who presents with a chief complaint of GI bleed (24 Sep 2024 09:41)      INTERVAL EVENTS: FREDO    SUBJECTIVE / INTERVAL HPI: Patient seen and examined at bedside. Reports feeling great, had solid food.    ROS: negative unless otherwise stated above.    VITAL SIGNS:  Vital Signs Last 24 Hrs  T(C): 37 (24 Sep 2024 08:57), Max: 37 (23 Sep 2024 21:07)  T(F): 98.6 (24 Sep 2024 08:57), Max: 98.6 (23 Sep 2024 21:07)  HR: 86 (24 Sep 2024 08:57) (75 - 86)  BP: 128/78 (24 Sep 2024 08:57) (125/81 - 129/81)  BP(mean): --  RR: 17 (24 Sep 2024 08:57) (17 - 18)  SpO2: 99% (24 Sep 2024 08:57) (96% - 99%)    Parameters below as of 24 Sep 2024 08:57  Patient On (Oxygen Delivery Method): room air          09-23-24 @ 07:01  - 09-24-24 @ 07:00  --------------------------------------------------------  IN: 1220 mL / OUT: 2350 mL / NET: -1130 mL    09-24-24 @ 07:01  - 09-24-24 @ 19:39  --------------------------------------------------------  IN: 320 mL / OUT: 250 mL / NET: 70 mL        PHYSICAL EXAM:    General: NAD, standing and walking in hallway  HEENT: normal sclera, glasses  Respiratory: speaking full setntences, normal RR, no acc musc use  Extremities: moving all extremities  Vascular: PIV, Picc  psych: calm, conversational  skin: no jaundice      MEDICATIONS:  MEDICATIONS  (STANDING):  chlorhexidine 2% Cloths 1 Application(s) Topical <User Schedule>  heparin   Injectable 5000 Unit(s) SubCutaneous every 8 hours  influenza   Vaccine 0.5 milliLiter(s) IntraMuscular once    MEDICATIONS  (PRN):  acetaminophen     Tablet .. 1000 milliGRAM(s) Oral every 6 hours PRN Mild Pain (1 - 3)  benzocaine/menthol Lozenge 1 Lozenge Oral every 4 hours PRN Sore Throat  HYDROmorphone  Injectable 0.2 milliGRAM(s) IV Push every 6 hours PRN breakthrough pain  naloxone Injectable 0.1 milliGRAM(s) IV Push every 3 minutes PRN For ANY of the following changes in patient status:  A. RR LESS THAN 10 breaths per minute, B. Oxygen saturation LESS THAN 90%, C. Sedation score of 6  ondansetron Injectable 4 milliGRAM(s) IV Push every 6 hours PRN Nausea and/or Vomiting  oxyCODONE    IR 5 milliGRAM(s) Oral every 6 hours PRN Severe Pain (7 - 10)  oxyCODONE    IR 2.5 milliGRAM(s) Oral every 6 hours PRN Moderate Pain (4 - 6)  polyethylene glycol 3350 17 Gram(s) Oral daily PRN Constipation      ALLERGIES:  Allergies    No Known Drug Allergies  shellfish (Anaphylaxis)    Intolerances        LABS:                        10.5   12.52 )-----------( 362      ( 23 Sep 2024 05:30 )             33.9     09-23    140  |  103  |  10  ----------------------------<  158[H]  4.0   |  27  |  0.61    Ca    8.9      23 Sep 2024 05:30  Phos  4.1     09-23  Mg     1.9     09-23        Urinalysis Basic - ( 23 Sep 2024 05:30 )    Color: x / Appearance: x / SG: x / pH: x  Gluc: 158 mg/dL / Ketone: x  / Bili: x / Urobili: x   Blood: x / Protein: x / Nitrite: x   Leuk Esterase: x / RBC: x / WBC x   Sq Epi: x / Non Sq Epi: x / Bacteria: x      CAPILLARY BLOOD GLUCOSE          RADIOLOGY & ADDITIONAL TESTS: Reviewed.

## 2024-09-24 NOTE — DISCHARGE NOTE NURSING/CASE MANAGEMENT/SOCIAL WORK - PATIENT PORTAL LINK FT
You can access the FollowMyHealth Patient Portal offered by [facility] by registering at the following website: [email or web address]. By joining [facility]’s FollowMyHealth portal, you will also be able to view your health information using other applications (apps) compatible with our system.

## 2024-09-24 NOTE — PROGRESS NOTE ADULT - SUBJECTIVE AND OBJECTIVE BOX
INTERVAL HPI/OVERNIGHT EVENTS: NAEON    STATUS POST:    9/11: EGD showing partial GOO  9/19: robotic distal gastrectomy with BR II    POST OPERATIVE DAY #: 5    SUBJECTIVE: Pt seen and examined by chief resident. Pt is doing well, resting comfortably on bed. Pain controlled. Diet tolerated. Ambulating out of bed. +F/+BM. No nausea or vomiting. No complaints at this time.    MEDICATIONS  (STANDING):  chlorhexidine 2% Cloths 1 Application(s) Topical <User Schedule>  heparin   Injectable 5000 Unit(s) SubCutaneous every 8 hours  influenza   Vaccine 0.5 milliLiter(s) IntraMuscular once  lipid, fat emulsion (Fish Oil and Plant Based) 20% Infusion 0.8606 Gm/kG/Day (20.83 mL/Hr) IV Continuous <Continuous>  Parenteral Nutrition - Adult 1 Each TPN Continuous <Continuous>    MEDICATIONS  (PRN):  acetaminophen     Tablet .. 1000 milliGRAM(s) Oral every 6 hours PRN Mild Pain (1 - 3)  benzocaine/menthol Lozenge 1 Lozenge Oral every 4 hours PRN Sore Throat  HYDROmorphone  Injectable 0.2 milliGRAM(s) IV Push every 6 hours PRN breakthrough pain  naloxone Injectable 0.1 milliGRAM(s) IV Push every 3 minutes PRN For ANY of the following changes in patient status:  A. RR LESS THAN 10 breaths per minute, B. Oxygen saturation LESS THAN 90%, C. Sedation score of 6  ondansetron Injectable 4 milliGRAM(s) IV Push every 6 hours PRN Nausea and/or Vomiting  oxyCODONE    IR 2.5 milliGRAM(s) Oral every 6 hours PRN Moderate Pain (4 - 6)  oxyCODONE    IR 5 milliGRAM(s) Oral every 6 hours PRN Severe Pain (7 - 10)  polyethylene glycol 3350 17 Gram(s) Oral daily PRN Constipation      Vital Signs Last 24 Hrs  T(C): 36.9 (24 Sep 2024 05:29), Max: 37.1 (23 Sep 2024 08:43)  T(F): 98.4 (24 Sep 2024 05:29), Max: 98.8 (23 Sep 2024 08:43)  HR: 75 (24 Sep 2024 05:29) (75 - 90)  BP: 129/81 (24 Sep 2024 05:29) (125/81 - 150/66)  BP(mean): --  RR: 17 (24 Sep 2024 05:29) (16 - 18)  SpO2: 98% (24 Sep 2024 05:29) (96% - 98%)    Parameters below as of 24 Sep 2024 05:29  Patient On (Oxygen Delivery Method): room air        PHYSICAL EXAM:    Constitutional: A&Ox3, nad  Respiratory: non labored breathing, no respiratory distress  Cardiovascular: NSR, RRR  Gastrointestinal:                 Incision:  Genitourinary:  Extremities: (-) edema, wwp, SCDs in place                  I&O's Detail    22 Sep 2024 07:01  -  23 Sep 2024 07:00  --------------------------------------------------------  IN:    Fat Emulsion (Fish Oil &amp; Plant Based) 20% Infusion: 104 mL    Lactated Ringers: 680 mL    Oral Fluid: 930 mL    TPN (Total Parenteral Nutrition): 822 mL  Total IN: 2536 mL    OUT:    Voided (mL): 2350 mL  Total OUT: 2350 mL    Total NET: 186 mL      23 Sep 2024 07:01  -  24 Sep 2024 06:10  --------------------------------------------------------  IN:    Oral Fluid: 1220 mL  Total IN: 1220 mL    OUT:    Voided (mL): 2350 mL  Total OUT: 2350 mL    Total NET: -1130 mL          LABS:                        10.5   12.52 )-----------( 362      ( 23 Sep 2024 05:30 )             33.9     09-23    140  |  103  |  10  ----------------------------<  158[H]  4.0   |  27  |  0.61    Ca    8.9      23 Sep 2024 05:30  Phos  4.1     09-23  Mg     1.9     09-23        Urinalysis Basic - ( 23 Sep 2024 05:30 )    Color: x / Appearance: x / SG: x / pH: x  Gluc: 158 mg/dL / Ketone: x  / Bili: x / Urobili: x   Blood: x / Protein: x / Nitrite: x   Leuk Esterase: x / RBC: x / WBC x   Sq Epi: x / Non Sq Epi: x / Bacteria: x        RADIOLOGY & ADDITIONAL STUDIES: INTERVAL HPI/OVERNIGHT EVENTS: FREDO    STATUS POST:    9/11: EGD showing partial GOO  9/19: robotic distal gastrectomy with BR II    POST OPERATIVE DAY #: 5    SUBJECTIVE: Pt seen and examined by chief resident. Pt is doing well, resting comfortably on bed. Pain controlled. Diet tolerated. Ambulating out of bed. +F/+BM. No nausea or vomiting. No complaints at this time.    MEDICATIONS  (STANDING):  chlorhexidine 2% Cloths 1 Application(s) Topical <User Schedule>  heparin   Injectable 5000 Unit(s) SubCutaneous every 8 hours  influenza   Vaccine 0.5 milliLiter(s) IntraMuscular once  lipid, fat emulsion (Fish Oil and Plant Based) 20% Infusion 0.8606 Gm/kG/Day (20.83 mL/Hr) IV Continuous <Continuous>  Parenteral Nutrition - Adult 1 Each TPN Continuous <Continuous>    MEDICATIONS  (PRN):  acetaminophen     Tablet .. 1000 milliGRAM(s) Oral every 6 hours PRN Mild Pain (1 - 3)  benzocaine/menthol Lozenge 1 Lozenge Oral every 4 hours PRN Sore Throat  HYDROmorphone  Injectable 0.2 milliGRAM(s) IV Push every 6 hours PRN breakthrough pain  naloxone Injectable 0.1 milliGRAM(s) IV Push every 3 minutes PRN For ANY of the following changes in patient status:  A. RR LESS THAN 10 breaths per minute, B. Oxygen saturation LESS THAN 90%, C. Sedation score of 6  ondansetron Injectable 4 milliGRAM(s) IV Push every 6 hours PRN Nausea and/or Vomiting  oxyCODONE    IR 2.5 milliGRAM(s) Oral every 6 hours PRN Moderate Pain (4 - 6)  oxyCODONE    IR 5 milliGRAM(s) Oral every 6 hours PRN Severe Pain (7 - 10)  polyethylene glycol 3350 17 Gram(s) Oral daily PRN Constipation      Vital Signs Last 24 Hrs  T(C): 36.9 (24 Sep 2024 05:29), Max: 37.1 (23 Sep 2024 08:43)  T(F): 98.4 (24 Sep 2024 05:29), Max: 98.8 (23 Sep 2024 08:43)  HR: 75 (24 Sep 2024 05:29) (75 - 90)  BP: 129/81 (24 Sep 2024 05:29) (125/81 - 150/66)  BP(mean): --  RR: 17 (24 Sep 2024 05:29) (16 - 18)  SpO2: 98% (24 Sep 2024 05:29) (96% - 98%)    Parameters below as of 24 Sep 2024 05:29  Patient On (Oxygen Delivery Method): room air        PHYSICAL EXAM:    Constitutional: A&Ox3, nad  Respiratory: non labored breathing, no respiratory distress  Cardiovascular: NSR, RRR  Gastrointestinal: Soft ND, NT                 Incision: CDI  Genitourinary: Voiding   Extremities: (-) edema, wwp, SCDs in place                  I&O's Detail    22 Sep 2024 07:01  -  23 Sep 2024 07:00  --------------------------------------------------------  IN:    Fat Emulsion (Fish Oil &amp; Plant Based) 20% Infusion: 104 mL    Lactated Ringers: 680 mL    Oral Fluid: 930 mL    TPN (Total Parenteral Nutrition): 822 mL  Total IN: 2536 mL    OUT:    Voided (mL): 2350 mL  Total OUT: 2350 mL    Total NET: 186 mL      23 Sep 2024 07:01  -  24 Sep 2024 06:10  --------------------------------------------------------  IN:    Oral Fluid: 1220 mL  Total IN: 1220 mL    OUT:    Voided (mL): 2350 mL  Total OUT: 2350 mL    Total NET: -1130 mL          LABS:                        10.5   12.52 )-----------( 362      ( 23 Sep 2024 05:30 )             33.9     09-23    140  |  103  |  10  ----------------------------<  158[H]  4.0   |  27  |  0.61    Ca    8.9      23 Sep 2024 05:30  Phos  4.1     09-23  Mg     1.9     09-23        Urinalysis Basic - ( 23 Sep 2024 05:30 )    Color: x / Appearance: x / SG: x / pH: x  Gluc: 158 mg/dL / Ketone: x  / Bili: x / Urobili: x   Blood: x / Protein: x / Nitrite: x   Leuk Esterase: x / RBC: x / WBC x   Sq Epi: x / Non Sq Epi: x / Bacteria: x        RADIOLOGY & ADDITIONAL STUDIES:

## 2024-09-24 NOTE — DISCHARGE NOTE NURSING/CASE MANAGEMENT/SOCIAL WORK - NSDCPEFALRISK_GEN_ALL_CORE
For information on Fall & Injury Prevention, visit: [email or web address] OR  [email or web address] OR  https://www.cdc.gov/steadi/patient.html

## 2024-09-24 NOTE — DISCHARGE NOTE NURSING/CASE MANAGEMENT/SOCIAL WORK - NSTRANSFERBELONGINGSDISPO_GEN_A_NUR
with patient Cyclophosphamide Counseling:  I discussed with the patient the risks of cyclophosphamide including but not limited to hair loss, hormonal abnormalities, decreased fertility, abdominal pain, diarrhea, nausea and vomiting, bone marrow suppression and infection. The patient understands that monitoring is required while taking this medication.

## 2024-09-24 NOTE — PROGRESS NOTE ADULT - NUTRITIONAL ASSESSMENT
This patient has been assessed with a concern for Malnutrition and has been determined to have a diagnosis/diagnoses of Severe protein-calorie malnutrition.    This patient is being managed with:   Parenteral Nutrition - Adult-  Entered: Sep 13 2024  5:00PM    lipid fat emulsion (Fish Oil and Plant Based) 20% Infusion-[Known as SMOFLIPID 20% Infusion]  50 Gram(s) in IV Solution 250 milliLiter(s) infuse at 20.83 mL/Hr  Dose Rate: 0.8612 Gm/kG/Day Infuse Over: 12 Hours; Stop After 12 Hours  Administration Instructions: Use 1.2 micron in-line filter  Entered: Sep 13 2024  5:00PM    Diet Regular-  Consistent Carbohydrate {No Snacks} (CSTCHO)  Pureed (PUREED)  Entered: Sep 11 2024  4:23PM  
This patient has been assessed with a concern for Malnutrition and has been determined to have a diagnosis/diagnoses of Severe protein-calorie malnutrition.    This patient is being managed with:   Parenteral Nutrition - Adult-  Entered: Sep 16 2024  5:00PM    lipid fat emulsion (Fish Oil and Plant Based) 20% Infusion-[Known as SMOFLIPID 20% Infusion]  50 Gram(s) in IV Solution 250 milliLiter(s) infuse at 20.8 mL/Hr  Dose Rate: 0.8612 Gm/kG/Day Infuse Over: 12 Hours; Stop After 12 Hours  Administration Instructions: Use 1.2 micron in-line filter  Entered: Sep 16 2024  5:00PM    Diet Regular-  Consistent Carbohydrate {No Snacks} (CSTCHO)  Pureed (PUREED)  Supplement Feeding Modality:  Oral  Ensure Plus High Protein Cans or Servings Per Day:  1       Frequency:  Two Times a day  Entered: Sep 16 2024  2:45PM  
This patient has been assessed with a concern for Malnutrition and has been determined to have a diagnosis/diagnoses of Severe protein-calorie malnutrition.    This patient is being managed with:   Parenteral Nutrition - Adult-  Entered: Sep 18 2024  5:00PM    lipid fat emulsion (Fish Oil and Plant Based) 20% Infusion-[Known as SMOFLIPID 20% Infusion]  50 Gram(s) in IV Solution 250 milliLiter(s) infuse at 20.8 mL/Hr  Dose Rate: 0.8612 Gm/kG/Day Infuse Over: 12 Hours; Stop After 12 Hours  Administration Instructions: Use 1.2 micron in-line filter  Entered: Sep 18 2024  5:00PM    Diet NPO after Midnight-     NPO Start Date: 18-Sep-2024   NPO Start Time: 23:59  Entered: Sep 18 2024 11:06AM    Parenteral Nutrition - Adult-  Entered: Sep 17 2024  5:00PM    Diet Regular-  Consistent Carbohydrate {No Snacks} (CSTCHO)  Pureed (PUREED)  Supplement Feeding Modality:  Oral  Ensure Plus High Protein Cans or Servings Per Day:  1       Frequency:  Two Times a day  Entered: Sep 16 2024  2:45PM  
This patient has been assessed with a concern for Malnutrition and has been determined to have a diagnosis/diagnoses of Severe protein-calorie malnutrition.    This patient is being managed with:   Parenteral Nutrition - Adult-  Entered: Sep 21 2024  5:00PM    lipid fat emulsion (Fish Oil and Plant Based) 20% Infusion-[Known as SMOFLIPID 20% Infusion]  50 Gram(s) in IV Solution 250 milliLiter(s) infuse at 20.83 mL/Hr  Dose Rate: 0.8606 Gm/kG/Day Infuse Over: 12 Hours; Stop After 12 Hours  Administration Instructions: Use 1.2 micron in-line filter  Entered: Sep 21 2024  5:00PM    Diet Clear Liquid-  Consistent Carbohydrate {No Snacks} (CSTCHO)  Entered: Sep 20 2024  5:42PM    Parenteral Nutrition - Adult-  Entered: Sep 20 2024  5:00PM  
This patient has been assessed with a concern for Malnutrition and has been determined to have a diagnosis/diagnoses of Severe protein-calorie malnutrition.    This patient is being managed with:   Parenteral Nutrition - Adult-  Entered: Sep 12 2024  5:00PM    lipid fat emulsion (Fish Oil and Plant Based) 20% Infusion-[Known as SMOFLIPID 20% Infusion]  50 Gram(s) in IV Solution 250 milliLiter(s) infuse at 20.8 mL/Hr  Dose Rate: 0.8612 Gm/kG/Day Infuse Over: 12 Hours; Stop After 12 Hours  Administration Instructions: Use 1.2 micron in-line filter  Entered: Sep 12 2024  5:00PM    Diet Regular-  Consistent Carbohydrate {No Snacks} (CSTCHO)  Pureed (PUREED)  Entered: Sep 11 2024  4:23PM  
This patient has been assessed with a concern for Malnutrition and has been determined to have a diagnosis/diagnoses of Severe protein-calorie malnutrition.    This patient is being managed with:   Parenteral Nutrition - Adult-  Entered: Sep 15 2024  5:00PM    lipid fat emulsion (Fish Oil and Plant Based) 20% Infusion-[Known as SMOFLIPID 20% Infusion]  50 Gram(s) in IV Solution 250 milliLiter(s) infuse at 20.83 mL/Hr  Dose Rate: 1.1612 Gm/kG/Day Infuse Over: 12 Hours; Stop After 12 Hours  Administration Instructions: Use 1.2 micron in-line filter  Entered: Sep 15 2024  5:00PM    Diet Regular-  Consistent Carbohydrate {No Snacks} (CSTCHO)  Pureed (PUREED)  Entered: Sep 11 2024  4:23PM  
This patient has been assessed with a concern for Malnutrition and has been determined to have a diagnosis/diagnoses of Severe protein-calorie malnutrition.    This patient is being managed with:   Parenteral Nutrition - Adult-  Entered: Sep 18 2024  5:00PM    lipid fat emulsion (Fish Oil and Plant Based) 20% Infusion-[Known as SMOFLIPID 20% Infusion]  50 Gram(s) in IV Solution 250 milliLiter(s) infuse at 20.83 mL/Hr  Dose Rate: 0.8612 Gm/kG/Day Infuse Over: 12 Hours; Stop After 12 Hours  Administration Instructions: Use 1.2 micron in-line filter  Entered: Sep 18 2024  5:00PM    Diet NPO after Midnight-     NPO Start Date: 18-Sep-2024   NPO Start Time: 23:59  Entered: Sep 18 2024 11:06AM    Diet Regular-  Consistent Carbohydrate {No Snacks} (CSTCHO)  Pureed (PUREED)  Supplement Feeding Modality:  Oral  Ensure Plus High Protein Cans or Servings Per Day:  1       Frequency:  Two Times a day  Entered: Sep 16 2024  2:45PM  
This patient has been assessed with a concern for Malnutrition and has been determined to have a diagnosis/diagnoses of Severe protein-calorie malnutrition.    This patient is being managed with:   Parenteral Nutrition - Adult-  Entered: Sep 21 2024  5:00PM    lipid fat emulsion (Fish Oil and Plant Based) 20% Infusion-[Known as SMOFLIPID 20% Infusion]  50 Gram(s) in IV Solution 250 milliLiter(s) infuse at 20.83 mL/Hr  Dose Rate: 0.8606 Gm/kG/Day Infuse Over: 12 Hours; Stop After 12 Hours  Administration Instructions: Use 1.2 micron in-line filter  Entered: Sep 21 2024  5:00PM    Diet Clear Liquid-  Consistent Carbohydrate {No Snacks} (CSTCHO)  Entered: Sep 20 2024  5:42PM  
This patient has been assessed with a concern for Malnutrition and has been determined to have a diagnosis/diagnoses of Severe protein-calorie malnutrition.    This patient is being managed with:   lipid fat emulsion (Fish Oil and Plant Based) 20% Infusion-[Known as SMOFLIPID 20% Infusion]  50 Gram(s) in IV Solution 250 milliLiter(s) infuse at 20.83 mL/Hr  Dose Rate: 0.86 Gm/kG/Day Infuse Over: 12 Hours; Stop After 12 Hours  Administration Instructions: Use 1.2 micron in-line filter  Entered: Sep 14 2024  5:00PM    Parenteral Nutrition - Adult-  Entered: Sep 14 2024  5:00PM    Diet Regular-  Consistent Carbohydrate {No Snacks} (CSTCHO)  Pureed (PUREED)  Entered: Sep 11 2024  4:23PM  
This patient has been assessed with a concern for Malnutrition and has been determined to have a diagnosis/diagnoses of Severe protein-calorie malnutrition.    This patient is being managed with:   Diet Clear Liquid-  Consistent Carbohydrate {No Snacks} (CSTCHO)  Entered: Sep 20 2024  5:42PM    Parenteral Nutrition - Adult-  Entered: Sep 20 2024  5:00PM    lipid fat emulsion (Fish Oil and Plant Based) 20% Infusion-[Known as SMOFLIPID 20% Infusion]  50 Gram(s) in IV Solution 250 milliLiter(s) infuse at 20.8 mL/Hr  Dose Rate: 0.8606 Gm/kG/Day Infuse Over: 12 Hours; Stop After 12 Hours  Administration Instructions: Use 1.2 micron in-line filter  Entered: Sep 20 2024  5:00PM  
This patient has been assessed with a concern for Malnutrition and has been determined to have a diagnosis/diagnoses of Severe protein-calorie malnutrition.    This patient is being managed with:   Parenteral Nutrition - Adult-  Entered: Sep 11 2024  5:00PM    lipid fat emulsion (Plant Based) 20% Infusion-  50 Gram(s) in IV Solution 250 milliLiter(s) infuse at 20.83 mL/Hr  Dose Rate: 0.8612 Gm/kG/Day Infuse Over: 12 Hours; Stop After 12 Hours  Administration Instructions: Use 1.2 micron in-line filter  Entered: Sep 11 2024  5:00PM    Diet Regular-  Consistent Carbohydrate {No Snacks} (CSTCHO)  Pureed (PUREED)  Entered: Sep 11 2024  4:23PM  
This patient has been assessed with a concern for Malnutrition and has been determined to have a diagnosis/diagnoses of Severe protein-calorie malnutrition.    This patient is being managed with:   Parenteral Nutrition - Adult-  Entered: Sep 20 2024  5:00PM    lipid fat emulsion (Fish Oil and Plant Based) 20% Infusion-[Known as SMOFLIPID 20% Infusion]  50 Gram(s) in IV Solution 250 milliLiter(s) infuse at 20.8 mL/Hr  Dose Rate: 0.8606 Gm/kG/Day Infuse Over: 12 Hours; Stop After 12 Hours  Administration Instructions: Use 1.2 micron in-line filter  Entered: Sep 20 2024  5:00PM    Parenteral Nutrition - Adult-  Entered: Sep 19 2024  5:00PM    Diet NPO-  Entered: Sep 19 2024 12:33PM  
This patient has been assessed with a concern for Malnutrition and has been determined to have a diagnosis/diagnoses of Severe protein-calorie malnutrition.    This patient is being managed with:   Parenteral Nutrition - Adult-  Entered: Sep 22 2024  5:00PM    lipid fat emulsion (Fish Oil and Plant Based) 20% Infusion-[Known as SMOFLIPID 20% Infusion]  50 Gram(s) in IV Solution 250 milliLiter(s) infuse at 20.8 mL/Hr  Dose Rate: 0.8606 Gm/kG/Day Infuse Over: 12 Hours; Stop After 12 Hours  Administration Instructions: Use 1.2 micron in-line filter  Entered: Sep 22 2024  5:00PM    Diet Clear Liquid-  Consistent Carbohydrate {No Snacks} (CSTCHO)  Entered: Sep 20 2024  5:42PM  
This patient has been assessed with a concern for Malnutrition and has been determined to have a diagnosis/diagnoses of Severe protein-calorie malnutrition.    This patient is being managed with:   Parenteral Nutrition - Adult-  Entered: Sep 16 2024  5:00PM    lipid fat emulsion (Fish Oil and Plant Based) 20% Infusion-[Known as SMOFLIPID 20% Infusion]  50 Gram(s) in IV Solution 250 milliLiter(s) infuse at 20.8 mL/Hr  Dose Rate: 0.8612 Gm/kG/Day Infuse Over: 12 Hours; Stop After 12 Hours  Administration Instructions: Use 1.2 micron in-line filter  Entered: Sep 16 2024  5:00PM    Diet Regular-  Consistent Carbohydrate {No Snacks} (CSTCHO)  Pureed (PUREED)  Supplement Feeding Modality:  Oral  Ensure Plus High Protein Cans or Servings Per Day:  1       Frequency:  Two Times a day  Entered: Sep 16 2024  2:45PM  
This patient has been assessed with a concern for Malnutrition and has been determined to have a diagnosis/diagnoses of Severe protein-calorie malnutrition.    This patient is being managed with:   Parenteral Nutrition - Adult-  Entered: Sep 17 2024  5:00PM    lipid fat emulsion (Fish Oil and Plant Based) 20% Infusion-[Known as SMOFLIPID 20% Infusion]  50 Gram(s) in IV Solution 250 milliLiter(s) infuse at 20.8 mL/Hr  Dose Rate: 0.8612 Gm/kG/Day Infuse Over: 12 Hours; Stop After 12 Hours  Administration Instructions: Use 1.2 micron in-line filter  Entered: Sep 17 2024  5:00PM    Diet Regular-  Consistent Carbohydrate {No Snacks} (CSTCHO)  Pureed (PUREED)  Supplement Feeding Modality:  Oral  Ensure Plus High Protein Cans or Servings Per Day:  1       Frequency:  Two Times a day  Entered: Sep 16 2024  2:45PM  
This patient has been assessed with a concern for Malnutrition and has been determined to have a diagnosis/diagnoses of Severe protein-calorie malnutrition.    This patient is being managed with:   Parenteral Nutrition - Adult-  Entered: Sep 19 2024  5:00PM    lipid fat emulsion (Fish Oil and Plant Based) 20% Infusion-[Known as SMOFLIPID 20% Infusion]  50 Gram(s) in IV Solution 250 milliLiter(s) infuse at 20.83 mL/Hr  Dose Rate: 0.8612 Gm/kG/Day Infuse Over: 12 Hours; Stop After 12 Hours  Administration Instructions: Use 1.2 micron in-line filter  Entered: Sep 19 2024  5:00PM    Diet NPO-  Entered: Sep 19 2024 12:33PM  
This patient has been assessed with a concern for Malnutrition and has been determined to have a diagnosis/diagnoses of Severe protein-calorie malnutrition.    This patient is being managed with:   Parenteral Nutrition - Adult-  Entered: Sep 23 2024  5:00PM    lipid fat emulsion (Fish Oil and Plant Based) 20% Infusion-[Known as SMOFLIPID 20% Infusion]  50 Gram(s) in IV Solution 250 milliLiter(s) infuse at 20.83 mL/Hr  Dose Rate: 0.8606 Gm/kG/Day Infuse Over: 12 Hours; Stop After 12 Hours  Administration Instructions: Use 1.2 micron in-line filter  Entered: Sep 23 2024  5:00PM    Diet Regular-  Consistent Carbohydrate {No Snacks} (CSTCHO)  Pureed (PUREED)  Supplement Feeding Modality:  Oral  Ensure Max Cans or Servings Per Day:  1       Frequency:  Two Times a day  Entered: Sep 23 2024  1:39PM

## 2024-09-26 ENCOUNTER — APPOINTMENT (OUTPATIENT)
Dept: INFUSION THERAPY | Facility: CLINIC | Age: 49
End: 2024-09-26

## 2024-09-27 ENCOUNTER — APPOINTMENT (OUTPATIENT)
Dept: INFUSION THERAPY | Facility: CLINIC | Age: 49
End: 2024-09-27

## 2024-10-01 ENCOUNTER — APPOINTMENT (OUTPATIENT)
Dept: SURGICAL ONCOLOGY | Facility: CLINIC | Age: 49
End: 2024-10-01

## 2024-10-01 ENCOUNTER — APPOINTMENT (OUTPATIENT)
Dept: HEMATOLOGY ONCOLOGY | Facility: CLINIC | Age: 49
End: 2024-10-01
Payer: COMMERCIAL

## 2024-10-01 VITALS
SYSTOLIC BLOOD PRESSURE: 132 MMHG | HEIGHT: 67 IN | RESPIRATION RATE: 16 BRPM | DIASTOLIC BLOOD PRESSURE: 85 MMHG | OXYGEN SATURATION: 98 % | WEIGHT: 131 LBS | TEMPERATURE: 98.1 F | HEART RATE: 88 BPM | BODY MASS INDEX: 20.56 KG/M2

## 2024-10-01 DIAGNOSIS — R73.03 PREDIABETES: ICD-10-CM

## 2024-10-01 DIAGNOSIS — C77.2 SECONDARY AND UNSPECIFIED MALIGNANT NEOPLASM OF INTRA-ABDOMINAL LYMPH NODES: ICD-10-CM

## 2024-10-01 DIAGNOSIS — C16.5 MALIGNANT NEOPLASM OF LESSER CURVATURE OF STOMACH, UNSPECIFIED: ICD-10-CM

## 2024-10-01 DIAGNOSIS — I10 ESSENTIAL (PRIMARY) HYPERTENSION: ICD-10-CM

## 2024-10-01 DIAGNOSIS — C16.9 MALIGNANT NEOPLASM OF STOMACH, UNSPECIFIED: ICD-10-CM

## 2024-10-01 DIAGNOSIS — K92.2 GASTROINTESTINAL HEMORRHAGE, UNSPECIFIED: ICD-10-CM

## 2024-10-01 DIAGNOSIS — Z79.899 OTHER LONG TERM (CURRENT) DRUG THERAPY: ICD-10-CM

## 2024-10-01 DIAGNOSIS — D50.9 IRON DEFICIENCY ANEMIA, UNSPECIFIED: ICD-10-CM

## 2024-10-01 DIAGNOSIS — D63.0 ANEMIA IN NEOPLASTIC DISEASE: ICD-10-CM

## 2024-10-01 DIAGNOSIS — K31.1 ADULT HYPERTROPHIC PYLORIC STENOSIS: ICD-10-CM

## 2024-10-01 DIAGNOSIS — D62 ACUTE POSTHEMORRHAGIC ANEMIA: ICD-10-CM

## 2024-10-01 DIAGNOSIS — E43 UNSPECIFIED SEVERE PROTEIN-CALORIE MALNUTRITION: ICD-10-CM

## 2024-10-01 DIAGNOSIS — Z86.19 PERSONAL HISTORY OF OTHER INFECTIOUS AND PARASITIC DISEASES: ICD-10-CM

## 2024-10-01 PROCEDURE — 99024 POSTOP FOLLOW-UP VISIT: CPT

## 2024-10-01 PROCEDURE — 99214 OFFICE O/P EST MOD 30 MIN: CPT

## 2024-10-01 NOTE — ASSESSMENT
[FreeTextEntry1] : Impression) normal postop  Plan) reviewed pathology results with the patient and his wife.  Has node positive disease.  Because of the bleeding issues he could not complete his preop chemotherapy and so will receive most of it postoperatively with Dr. Pereira.    He is doing well on a regular diet and has no other issues.We will see him again in 3 months time.  All questions answered.  Nikko Carrasco MD  Chief Surgical Oncology Multidisciplinary GI cancer program [facility]  Professor Surgery [facility]

## 2024-10-01 NOTE — PHYSICAL EXAM
[Normal] : well developed, well nourished, in no acute distress [de-identified] : soft, non tender, surgical sites are healing well without any signs of infection

## 2024-10-01 NOTE — HISTORY OF PRESENT ILLNESS
[de-identified] : Isrrael Cortez is a 49 year old male here for follow up of gastric cancer.   Oncology history: 1.  gastric adenocarcinoma -presented to [facility] ER on 7/11/24 with severe symptomatic anemia, epigastric pain, and weight loss of 20lbs over 3 months.  s/p 3 units of PRBC during admission.  labs confirmed iron deficiency - CT AP on 7/11/24 showed mural thickening at the gastric antrum may represent gastritis or gastric cancer.  0.9 cm sclerotic focus in the left iliac bone may represent a bone island.  - s/p EGD with a biopsy on 7/12/24, notable for large ulcerated mass on lesser curvature of stomach, c/w malignancy. No active bleeding.  Biopsy of the gastric mass confirmed adenocarcinoma, moderately differentiated in background of ulceration and Helicobacter pylori associated gastritis and intestinal metaplasia, MMR intact, HER2/khang:  1+ score, negative.  PD-L1/CPS : 4 - CT chest on 7/25/24 (at [facility] due to insurance reasons): no evidence of metastatic disease in chest  - Diagnostic laparoscopy (Dr. Carrasco) on 7/31/24, notable for tumor along lesser curvature, clinically T2-T3 disease. Peritoneal washings with rare groups of atypical, degenerated cells.  No peritoneal carcinomatosis. - neoadjuvant FLOT chemotherapy initiated 8/14/24.  C1 complicated by hospitalization 8/21/24 - 8/26/24, for symptomatic anemia 2/2 suspected UGIB. s/p pRBC x3, no other acute interventions required  s/p cycle 2 FLOT on 8/28/24,  admitted to [facility] 9/7/24-9/9/24 with syncopal episode 2/ severe anemia again despite mid cycle count check.  aborted neoadjuvant chemotherapy due to these complications, concern for bleeding tumor -s/p robotic distal gastrectomy with D2 lymphadenectomy on 9/19/24 (Dr Carrasco).  Pathology:  5.3 cm, moderately differentiated (grade 2) adenocarcinoma.  treatment effect present;  some tumor regression.  no LVI.  9/18 lymph nodes positive.  Margins negative. [id number] (stage III)  PMH: HTN PSH:  none  FH: aunt with breast ca SH: nonsmoker, no drug/alcohol,  at high school,  (wife Cora) with 2 teenagers, lives in [place] [de-identified] : recovering well from surgery minimal pain eating well no dizziness/lightheadedness. no N/V/D/C

## 2024-10-01 NOTE — ASSESSMENT
[FreeTextEntry1] : Isrrael Cortez is a 49 year old man who presented with severe iron deficiency anemia. CT scan showed thickening of the gastric antrum concerning for malignancy.  EGD showed an ulcerated mass along the lesser curvature of the stomach; biopsy confirmed moderately differentiated adenocarcinoma, with H pylori organisms present, MMR intact by IHC. CT of the chest did not demonstrate any thoracic metastatic disease. Diagnostic laparoscopy on 7/31/24 showing clinical T2-T3 disease. Peritoneal washings with rare atypical cells.  Completed 2 cycles of neoadjuvant chemotherapy with FLOT with complications - hospitalization x 2 due to severe anemia, syncope.  Aborted chemotherapy due to complications.  Underwent robotic distal gastrectomy with D2 lymphadenectomy on 9/19/24 (Dr Carrasco). Pathology: 5.3 cm, moderately differentiated (grade 2) adenocarcinoma. treatment effect present; some tumor regression. no LVI. 9/18 lymph nodes positive. Margins negative. [id number] (stage III)  Plan: #  gastric cancer --discussed pathology results from surgery.  Significant residual disease but some treatment effect --recommend resuming chemotherapy once recovered from surgery.  Will resume FLOT.  given previous poor tolerance will give first cycle with 20% dose reduction.  If tolerated can go back to full dosing. --since healing well, will aim to start approx 4 weeks from surgery --total 8 cycles of chemo (6 remaining in the adjuvant setting) --prognosis reviewed;  treatment is being delivered with Curative intent.    #  iron deficiency anemia/blood loss anemia --trend CBC.  check iron studies next visit.  #  weight loss, unintentional --reconsult dietician for guidance about post gastrectomy diet.  RTC 10/17/24 with C3 FLOT CBC, CMP, iron panel, ferritin

## 2024-10-01 NOTE — ASSESSMENT
[FreeTextEntry1] : Isrrael Cortez is a 49 year old man who presented with severe iron deficiency anemia. CT scan showed thickening of the gastric antrum concerning for malignancy.  EGD showed an ulcerated mass along the lesser curvature of the stomach; biopsy confirmed moderately differentiated adenocarcinoma, with H pylori organisms present, MMR intact by IHC. CT of the chest did not demonstrate any thoracic metastatic disease. Diagnostic laparoscopy on 7/31/24 showing clinical T2-T3 disease. Peritoneal washings with rare atypical cells.  Completed 2 cycles of neoadjuvant chemotherapy with FLOT with complications - hospitalization x 2 due to severe anemia, syncope.  Aborted chemotherapy due to complications.  Underwent robotic distal gastrectomy with D2 lymphadenectomy on 9/19/24 (Dr Carrasco). Pathology: 5.3 cm, moderately differentiated (grade 2) adenocarcinoma. treatment effect present; some tumor regression. no LVI. 9/18 lymph nodes positive. Margins negative. [id number] (stage III)  Plan: #  gastric cancer --discussed pathology results from surgery.  Significant residual disease but some treatment effect --recommend resuming chemotherapy once recovered from surgery.  Will resume FLOT.  given previous poor tolerance will give first cycle with 20% dose reduction.  If tolerated can go back to full dosing. --since healing well, will aim to start approx 4 weeks from surgery --total 8 cycles of chemo (6 remaining in the adjuvant setting) --prognosis reviewed;  treatment is being delivered with Curative intent.    #  iron deficiency anemia/blood loss anemia --trend CBC.  check iron studies next visit.  #  weight loss, unintentional --reconsult dietician for guidance about post gastrectomy diet.  RTC 10/17/24 with C3 FLOT CBC, CMP, iron panel, ferritin

## 2024-10-01 NOTE — HISTORY OF PRESENT ILLNESS
[FreeTextEntry1] : Patient Name: FRANC GARZA  MRN: [id number]  Referring Provider: Vannesa Snider MD  Oncologist: Dr. Pereira Date: 10/1/2024   Diagnosis: Gastric cancer Operative Date: (1) 7/31/24 (2) 9/19/24 Procedure: (1) laparoscopy (2) robotic subtotal gastrectomy Pathology: (1) no peritoneal carcinomatosis   He presents for a scheduled post operative visit. He is 12 days post op.  He had been admitted to the hospital urgently because of bleeding.  He was placed on TPN for 10 days prior to surgery to facilitate improvement in nutrition.  His surgery was uneventful.  He had an unremarkable postoperative course as well.  Currently, Mr. GARZA feels good. He denies abdominal pain or discomfort, he is tolerating a regular diet, he denies nausea or vomiting and is having regular bowel movements except for one episode of diarrhea over the weekend.  Final Pathology Showed: Invasive moderately differentiated adenocarcinoma, 9/18 positive lymph nodes, [id number]

## 2024-10-01 NOTE — PHYSICAL EXAM
[Normal] : well developed, well nourished, in no acute distress [de-identified] : soft, non tender, surgical sites are healing well without any signs of infection

## 2024-10-01 NOTE — PHYSICAL EXAM
[Restricted in physically strenuous activity but ambulatory and able to carry out work of a light or sedentary nature] : Status 1- Restricted in physically strenuous activity but ambulatory and able to carry out work of a light or sedentary nature, e.g., light house work, office work [Normal] : affect appropriate [de-identified] : supple [de-identified] : normal RR, breathing pattern [de-identified] : normal HR [de-identified] : no edema noted [de-identified] : soft, nondistended.  incisions healing well. [de-identified] : Right chest wall port, no concerns for infection.

## 2024-10-01 NOTE — ASSESSMENT
[FreeTextEntry1] : Impression) normal postop  Plan) reviewed pathology results with the patient and his wife.  Has node positive disease.  Because of the bleeding issues he could not complete his preop chemotherapy and so will receive most of it postoperatively with Dr. Pereira.    He is doing well on a regular diet and has no other issues.We will see him again in 3 months time.  All questions answered.  Nikko Carrasco MD  Chief Surgical Oncology Multidisciplinary GI cancer program [facility]  Professor Surgery [facility]

## 2024-10-01 NOTE — HISTORY OF PRESENT ILLNESS
[de-identified] : Isrrael Cortez is a 49 year old male here for follow up of gastric cancer.   Oncology history: 1.  gastric adenocarcinoma -presented to [facility] ER on 7/11/24 with severe symptomatic anemia, epigastric pain, and weight loss of 20lbs over 3 months.  s/p 3 units of PRBC during admission.  labs confirmed iron deficiency - CT AP on 7/11/24 showed mural thickening at the gastric antrum may represent gastritis or gastric cancer.  0.9 cm sclerotic focus in the left iliac bone may represent a bone island.  - s/p EGD with a biopsy on 7/12/24, notable for large ulcerated mass on lesser curvature of stomach, c/w malignancy. No active bleeding.  Biopsy of the gastric mass confirmed adenocarcinoma, moderately differentiated in background of ulceration and Helicobacter pylori associated gastritis and intestinal metaplasia, MMR intact, HER2/khang:  1+ score, negative.  PD-L1/CPS : 4 - CT chest on 7/25/24 (at [facility] due to insurance reasons): no evidence of metastatic disease in chest  - Diagnostic laparoscopy (Dr. Carrasco) on 7/31/24, notable for tumor along lesser curvature, clinically T2-T3 disease. Peritoneal washings with rare groups of atypical, degenerated cells.  No peritoneal carcinomatosis. - neoadjuvant FLOT chemotherapy initiated 8/14/24.  C1 complicated by hospitalization 8/21/24 - 8/26/24, for symptomatic anemia 2/2 suspected UGIB. s/p pRBC x3, no other acute interventions required  s/p cycle 2 FLOT on 8/28/24,  admitted to [facility] 9/7/24-9/9/24 with syncopal episode 2/ severe anemia again despite mid cycle count check.  aborted neoadjuvant chemotherapy due to these complications, concern for bleeding tumor -s/p robotic distal gastrectomy with D2 lymphadenectomy on 9/19/24 (Dr Carrasco).  Pathology:  5.3 cm, moderately differentiated (grade 2) adenocarcinoma.  treatment effect present;  some tumor regression.  no LVI.  9/18 lymph nodes positive.  Margins negative. [id number] (stage III)  PMH: HTN PSH:  none  FH: aunt with breast ca SH: nonsmoker, no drug/alcohol,  at high school,  (wife Cora) with 2 teenagers, lives in [place] [de-identified] : recovering well from surgery minimal pain eating well no dizziness/lightheadedness. no N/V/D/C

## 2024-10-01 NOTE — PHYSICAL EXAM
[Restricted in physically strenuous activity but ambulatory and able to carry out work of a light or sedentary nature] : Status 1- Restricted in physically strenuous activity but ambulatory and able to carry out work of a light or sedentary nature, e.g., light house work, office work [Normal] : affect appropriate [de-identified] : supple [de-identified] : normal RR, breathing pattern [de-identified] : normal HR [de-identified] : no edema noted [de-identified] : soft, nondistended.  incisions healing well. [de-identified] : Right chest wall port, no concerns for infection.

## 2024-10-01 NOTE — HISTORY OF PRESENT ILLNESS
[FreeTextEntry1] : Patient Name: FRANC GARZA  MRN: [id number]  Referring Provider: Vannesa Snider MD  Oncologist: Dr. Pereira Date: 10/1/2024   Diagnosis: Gastric cancer Operative Date: (1) 7/31/24 (2) 9/19/24 Procedure: (1) laparoscopy (2) robotic subtotal gastrectomy Pathology: (1) no peritoneal carcinomatosis   He presents for a scheduled post operative visit. He is 12 days post op.  He had been admitted to the hospital urgently because of bleeding.  He was placed on TPN for 10 days prior to surgery to facilitate improvement in nutrition.  His surgery was uneventful.  He had an unremarkable postoperative course as well.  Currently, Mr. GARZA feels good. He denies abdominal pain or discomfort, he is tolerating a regular diet, he denies nausea or vomiting and is having regular bowel movements except for one episode of diarrhea over the weekend.  Final Pathology Showed: Invasive moderately differentiated adenocarcinoma, 9/18 positive lymph nodes, [id number]

## 2024-10-15 ENCOUNTER — NON-APPOINTMENT (OUTPATIENT)
Age: 49
End: 2024-10-15

## 2024-10-16 ENCOUNTER — NON-APPOINTMENT (OUTPATIENT)
Age: 49
End: 2024-10-16

## 2024-10-17 ENCOUNTER — NON-APPOINTMENT (OUTPATIENT)
Age: 49
End: 2024-10-17

## 2024-10-17 ENCOUNTER — APPOINTMENT (OUTPATIENT)
Dept: HEMATOLOGY ONCOLOGY | Facility: CLINIC | Age: 49
End: 2024-10-17
Payer: COMMERCIAL

## 2024-10-17 ENCOUNTER — OUTPATIENT (OUTPATIENT)
Dept: OUTPATIENT SERVICES | Facility: HOSPITAL | Age: 49
LOS: 1 days | End: 2024-10-17
Payer: COMMERCIAL

## 2024-10-17 ENCOUNTER — APPOINTMENT (OUTPATIENT)
Dept: INFUSION THERAPY | Facility: CLINIC | Age: 49
End: 2024-10-17

## 2024-10-17 VITALS
OXYGEN SATURATION: 97 % | WEIGHT: 132 LBS | SYSTOLIC BLOOD PRESSURE: 141 MMHG | HEIGHT: 67 IN | RESPIRATION RATE: 18 BRPM | TEMPERATURE: 98 F | BODY MASS INDEX: 20.72 KG/M2 | DIASTOLIC BLOOD PRESSURE: 91 MMHG | HEART RATE: 89 BPM

## 2024-10-17 VITALS
HEART RATE: 63 BPM | HEIGHT: 67 IN | SYSTOLIC BLOOD PRESSURE: 133 MMHG | DIASTOLIC BLOOD PRESSURE: 80 MMHG | OXYGEN SATURATION: 98 % | RESPIRATION RATE: 18 BRPM | WEIGHT: 132.06 LBS | TEMPERATURE: 98 F

## 2024-10-17 VITALS
RESPIRATION RATE: 18 BRPM | OXYGEN SATURATION: 98 % | SYSTOLIC BLOOD PRESSURE: 129 MMHG | DIASTOLIC BLOOD PRESSURE: 77 MMHG | TEMPERATURE: 98 F | HEART RATE: 68 BPM

## 2024-10-17 DIAGNOSIS — D50.0 IRON DEFICIENCY ANEMIA SECONDARY TO BLOOD LOSS (CHRONIC): ICD-10-CM

## 2024-10-17 DIAGNOSIS — C16.9 MALIGNANT NEOPLASM OF STOMACH, UNSPECIFIED: ICD-10-CM

## 2024-10-17 DIAGNOSIS — D50.9 IRON DEFICIENCY ANEMIA, UNSPECIFIED: ICD-10-CM

## 2024-10-17 PROCEDURE — 96416 CHEMO PROLONG INFUSE W/PUMP: CPT

## 2024-10-17 PROCEDURE — 96415 CHEMO IV INFUSION ADDL HR: CPT

## 2024-10-17 PROCEDURE — 99214 OFFICE O/P EST MOD 30 MIN: CPT | Mod: 25

## 2024-10-17 PROCEDURE — 96367 TX/PROPH/DG ADDL SEQ IV INF: CPT

## 2024-10-17 PROCEDURE — 96417 CHEMO IV INFUS EACH ADDL SEQ: CPT

## 2024-10-17 PROCEDURE — 36415 COLL VENOUS BLD VENIPUNCTURE: CPT

## 2024-10-17 PROCEDURE — 96375 TX/PRO/DX INJ NEW DRUG ADDON: CPT

## 2024-10-17 PROCEDURE — 96413 CHEMO IV INFUSION 1 HR: CPT

## 2024-10-17 RX ORDER — DOCETAXEL 10 MG/ML
70 INJECTION, SOLUTION INTRAVENOUS ONCE
Refills: 0 | Status: COMPLETED | OUTPATIENT
Start: 2024-10-17 | End: 2024-10-17

## 2024-10-17 RX ORDER — LIDOCAINE 50 MG/G
1 CREAM TOPICAL ONCE
Refills: 0 | Status: COMPLETED | OUTPATIENT
Start: 2024-10-17 | End: 2024-10-17

## 2024-10-17 RX ORDER — LEUCOVORIN CALCIUM 25 MG
340 TABLET ORAL ONCE
Refills: 0 | Status: COMPLETED | OUTPATIENT
Start: 2024-10-17 | End: 2024-10-17

## 2024-10-17 RX ORDER — FOSAPREPITANT 150 MG/5ML
150 INJECTION, POWDER, LYOPHILIZED, FOR SOLUTION INTRAVENOUS ONCE
Refills: 0 | Status: COMPLETED | OUTPATIENT
Start: 2024-10-17 | End: 2024-10-17

## 2024-10-17 RX ORDER — OXALIPLATIN 5 MG/ML
115 INJECTION, SOLUTION, CONCENTRATE INTRAVENOUS ONCE
Refills: 0 | Status: COMPLETED | OUTPATIENT
Start: 2024-10-17 | End: 2024-10-17

## 2024-10-17 RX ORDER — PALONOSETRON 0.25 MG/5ML
0.25 INJECTION, SOLUTION INTRAVENOUS ONCE
Refills: 0 | Status: COMPLETED | OUTPATIENT
Start: 2024-10-17 | End: 2024-10-17

## 2024-10-17 RX ORDER — FLUOROURACIL 50 MG/ML
3245 INJECTION, SOLUTION INTRAVENOUS ONCE
Refills: 0 | Status: COMPLETED | OUTPATIENT
Start: 2024-10-17 | End: 2024-10-17

## 2024-10-17 RX ADMIN — FOSAPREPITANT 150 MILLIGRAM(S): 150 INJECTION, POWDER, LYOPHILIZED, FOR SOLUTION INTRAVENOUS at 11:32

## 2024-10-17 RX ADMIN — FLUOROURACIL 3245 MILLIGRAM(S): 50 INJECTION, SOLUTION INTRAVENOUS at 15:07

## 2024-10-17 RX ADMIN — Medication 340 MILLIGRAM(S): at 12:41

## 2024-10-17 RX ADMIN — DOCETAXEL 70 MILLIGRAM(S): 10 INJECTION, SOLUTION INTRAVENOUS at 12:40

## 2024-10-17 RX ADMIN — OXALIPLATIN 115 MILLIGRAM(S): 5 INJECTION, SOLUTION, CONCENTRATE INTRAVENOUS at 12:41

## 2024-10-17 RX ADMIN — DOCETAXEL 70 MILLIGRAM(S): 10 INJECTION, SOLUTION INTRAVENOUS at 11:40

## 2024-10-17 RX ADMIN — PALONOSETRON 0.25 MILLIGRAM(S): 0.25 INJECTION, SOLUTION INTRAVENOUS at 10:59

## 2024-10-17 RX ADMIN — OXALIPLATIN 115 MILLIGRAM(S): 5 INJECTION, SOLUTION, CONCENTRATE INTRAVENOUS at 14:41

## 2024-10-17 RX ADMIN — Medication 340 MILLIGRAM(S): at 14:41

## 2024-10-17 RX ADMIN — FOSAPREPITANT 500 MILLIGRAM(S): 150 INJECTION, POWDER, LYOPHILIZED, FOR SOLUTION INTRAVENOUS at 11:02

## 2024-10-18 ENCOUNTER — OUTPATIENT (OUTPATIENT)
Dept: OUTPATIENT SERVICES | Facility: HOSPITAL | Age: 49
LOS: 1 days | End: 2024-10-18

## 2024-10-18 ENCOUNTER — APPOINTMENT (OUTPATIENT)
Dept: INFUSION THERAPY | Facility: CLINIC | Age: 49
End: 2024-10-18

## 2024-10-18 VITALS
OXYGEN SATURATION: 98 % | SYSTOLIC BLOOD PRESSURE: 141 MMHG | RESPIRATION RATE: 18 BRPM | DIASTOLIC BLOOD PRESSURE: 78 MMHG | HEART RATE: 67 BPM | TEMPERATURE: 97 F

## 2024-10-18 DIAGNOSIS — Z98.890 OTHER SPECIFIED POSTPROCEDURAL STATES: Chronic | ICD-10-CM

## 2024-10-18 LAB
ALBUMIN SERPL ELPH-MCNC: 3.9 G/DL
ALP BLD-CCNC: 48 U/L
ALT SERPL-CCNC: 15 U/L
ANION GAP SERPL CALC-SCNC: -2 MMOL/L
AST SERPL-CCNC: 21 U/L
BILIRUB SERPL-MCNC: 0.6 MG/DL
BUN SERPL-MCNC: 11 MG/DL
CALCIUM SERPL-MCNC: 10.6 MG/DL
CHLORIDE SERPL-SCNC: 110 MMOL/L
CO2 SERPL-SCNC: 30 MMOL/L
CREAT SERPL-MCNC: 0.9 MG/DL
EGFR: 103 ML/MIN/1.73M2
FERRITIN SERPL-MCNC: 79 NG/ML
GLUCOSE SERPL-MCNC: 226 MG/DL
HCT VFR BLD CALC: 39.3 %
HGB BLD-MCNC: 12.9 G/DL
IRON SATN MFR SERPL: 24 %
IRON SERPL-MCNC: 65 UG/DL
LYMPHOCYTES # BLD AUTO: 0.7 K/UL
LYMPHOCYTES NFR BLD AUTO: 5.9 %
MAN DIFF?: NO
MCHC RBC-ENTMCNC: 30.1 PG
MCHC RBC-ENTMCNC: 32.8 GM/DL
MCV RBC AUTO: 91.6 FL
NEUTROPHILS # BLD AUTO: 10.6 K/UL
NEUTROPHILS NFR BLD AUTO: 89 %
PLATELET # BLD AUTO: 260 K/UL
POTASSIUM SERPL-SCNC: 4.7 MMOL/L
PROT SERPL-MCNC: 6.9 G/DL
RBC # BLD: 4.29 M/UL
RBC # FLD: 15.9 %
SODIUM SERPL-SCNC: 138 MMOL/L
TIBC SERPL-MCNC: 272 UG/DL
UIBC SERPL-MCNC: 207 UG/DL
WBC # FLD AUTO: 11.9 K/UL

## 2024-10-18 RX ADMIN — FLUOROURACIL 3245 MILLIGRAM(S): 50 INJECTION, SOLUTION INTRAVENOUS at 16:30

## 2024-10-29 RX ORDER — FLUOROURACIL 50 MG/ML
3245 INJECTION, SOLUTION INTRAVENOUS ONCE
Refills: 0 | Status: COMPLETED | OUTPATIENT
Start: 2024-10-31 | End: 2024-10-31

## 2024-10-31 ENCOUNTER — APPOINTMENT (OUTPATIENT)
Dept: INFUSION THERAPY | Facility: CLINIC | Age: 49
End: 2024-10-31

## 2024-10-31 ENCOUNTER — APPOINTMENT (OUTPATIENT)
Dept: HEMATOLOGY ONCOLOGY | Facility: CLINIC | Age: 49
End: 2024-10-31

## 2024-10-31 ENCOUNTER — OUTPATIENT (OUTPATIENT)
Dept: OUTPATIENT SERVICES | Facility: HOSPITAL | Age: 49
LOS: 1 days | End: 2024-10-31
Payer: COMMERCIAL

## 2024-10-31 VITALS
RESPIRATION RATE: 18 BRPM | DIASTOLIC BLOOD PRESSURE: 95 MMHG | OXYGEN SATURATION: 98 % | HEART RATE: 73 BPM | SYSTOLIC BLOOD PRESSURE: 147 MMHG | HEIGHT: 67 IN | TEMPERATURE: 98 F | WEIGHT: 132.94 LBS

## 2024-10-31 VITALS
DIASTOLIC BLOOD PRESSURE: 93 MMHG | TEMPERATURE: 97.9 F | BODY MASS INDEX: 21.03 KG/M2 | HEART RATE: 70 BPM | OXYGEN SATURATION: 98 % | WEIGHT: 134 LBS | SYSTOLIC BLOOD PRESSURE: 154 MMHG | HEIGHT: 67 IN | RESPIRATION RATE: 18 BRPM

## 2024-10-31 DIAGNOSIS — Z87.898 PERSONAL HISTORY OF OTHER SPECIFIED CONDITIONS: ICD-10-CM

## 2024-10-31 DIAGNOSIS — K92.2 GASTROINTESTINAL HEMORRHAGE, UNSPECIFIED: ICD-10-CM

## 2024-10-31 DIAGNOSIS — C14.8 MALIGNANT NEOPLASM OF OVERLAPPING SITES OF LIP, ORAL CAVITY AND PHARYNX: ICD-10-CM

## 2024-10-31 DIAGNOSIS — Z98.890 OTHER SPECIFIED POSTPROCEDURAL STATES: Chronic | ICD-10-CM

## 2024-10-31 DIAGNOSIS — C16.9 MALIGNANT NEOPLASM OF STOMACH, UNSPECIFIED: ICD-10-CM

## 2024-10-31 LAB
ALBUMIN SERPL ELPH-MCNC: 3.7 G/DL
ALP BLD-CCNC: 55 U/L
ALT SERPL-CCNC: 20 U/L
ANION GAP SERPL CALC-SCNC: 7 MMOL/L
AST SERPL-CCNC: 23 U/L
BILIRUB SERPL-MCNC: 0.6 MG/DL
BUN SERPL-MCNC: 12 MG/DL
CALCIUM SERPL-MCNC: 10.1 MG/DL
CHLORIDE SERPL-SCNC: 104 MMOL/L
CO2 SERPL-SCNC: 28 MMOL/L
CREAT SERPL-MCNC: 0.5 MG/DL
EGFR: 123 ML/MIN/1.73M2
GLUCOSE SERPL-MCNC: 218 MG/DL
HCT VFR BLD CALC: 39.5 %
HGB BLD-MCNC: 12.9 G/DL
LYMPHOCYTES # BLD AUTO: 0.6 K/UL
LYMPHOCYTES NFR BLD AUTO: 11.9 %
MAN DIFF?: NO
MCHC RBC-ENTMCNC: 29.5 PG
MCHC RBC-ENTMCNC: 32.7 G/DL
MCV RBC AUTO: 90.2 FL
NEUTROPHILS # BLD AUTO: 4 K/UL
NEUTROPHILS NFR BLD AUTO: 74.4 %
PLATELET # BLD AUTO: 213 K/UL
POTASSIUM SERPL-SCNC: 4.5 MMOL/L
PROT SERPL-MCNC: 6.6 G/DL
RBC # BLD: 4.38 M/UL
RBC # FLD: 15.1 %
SODIUM SERPL-SCNC: 139 MMOL/L
WBC # FLD AUTO: 5.3 K/UL

## 2024-10-31 PROCEDURE — 36415 COLL VENOUS BLD VENIPUNCTURE: CPT

## 2024-10-31 PROCEDURE — 96415 CHEMO IV INFUSION ADDL HR: CPT

## 2024-10-31 PROCEDURE — 96417 CHEMO IV INFUS EACH ADDL SEQ: CPT

## 2024-10-31 PROCEDURE — 99214 OFFICE O/P EST MOD 30 MIN: CPT | Mod: 25

## 2024-10-31 PROCEDURE — 96416 CHEMO PROLONG INFUSE W/PUMP: CPT

## 2024-10-31 PROCEDURE — 96367 TX/PROPH/DG ADDL SEQ IV INF: CPT

## 2024-10-31 PROCEDURE — 96375 TX/PRO/DX INJ NEW DRUG ADDON: CPT

## 2024-10-31 PROCEDURE — 96413 CHEMO IV INFUSION 1 HR: CPT

## 2024-10-31 RX ORDER — PALONOSETRON HYDROCHLORIDE 0.05 MG/ML
0.25 INJECTION INTRAVENOUS ONCE
Refills: 0 | Status: COMPLETED | OUTPATIENT
Start: 2024-10-31 | End: 2024-10-31

## 2024-10-31 RX ORDER — DOCETAXEL 80 MG/4ML
70 INJECTION INTRAVENOUS ONCE
Refills: 0 | Status: COMPLETED | OUTPATIENT
Start: 2024-10-31 | End: 2024-10-31

## 2024-10-31 RX ORDER — CALCIUM FOLINATE 10 MG/ML
340 INJECTION, SOLUTION INTRAMUSCULAR; INTRAVENOUS ONCE
Refills: 0 | Status: COMPLETED | OUTPATIENT
Start: 2024-10-31 | End: 2024-10-31

## 2024-10-31 RX ORDER — LIDOCAINE HYDROCHLORIDE 40 MG/ML
1 SOLUTION TOPICAL ONCE
Refills: 0 | Status: COMPLETED | OUTPATIENT
Start: 2024-10-31 | End: 2024-10-31

## 2024-10-31 RX ORDER — FOSAPREPITANT 150 MG/5ML
150 INJECTION, POWDER, LYOPHILIZED, FOR SOLUTION INTRAVENOUS ONCE
Refills: 0 | Status: COMPLETED | OUTPATIENT
Start: 2024-10-31 | End: 2024-10-31

## 2024-10-31 RX ORDER — OXALIPLATIN 5 MG/ML
115 INJECTION, SOLUTION INTRAVENOUS ONCE
Refills: 0 | Status: COMPLETED | OUTPATIENT
Start: 2024-10-31 | End: 2024-10-31

## 2024-10-31 RX ADMIN — OXALIPLATIN 115 MILLIGRAM(S): 5 INJECTION, SOLUTION INTRAVENOUS at 16:15

## 2024-10-31 RX ADMIN — DOCETAXEL 70 MILLIGRAM(S): 80 INJECTION INTRAVENOUS at 13:59

## 2024-10-31 RX ADMIN — CALCIUM FOLINATE 340 MILLIGRAM(S): 10 INJECTION, SOLUTION INTRAMUSCULAR; INTRAVENOUS at 16:15

## 2024-10-31 RX ADMIN — FOSAPREPITANT 150 MILLIGRAM(S): 150 INJECTION, POWDER, LYOPHILIZED, FOR SOLUTION INTRAVENOUS at 12:54

## 2024-10-31 RX ADMIN — DOCETAXEL 70 MILLIGRAM(S): 80 INJECTION INTRAVENOUS at 12:59

## 2024-10-31 RX ADMIN — PALONOSETRON HYDROCHLORIDE 0.25 MILLIGRAM(S): 0.05 INJECTION INTRAVENOUS at 12:24

## 2024-10-31 RX ADMIN — FOSAPREPITANT 500 MILLIGRAM(S): 150 INJECTION, POWDER, LYOPHILIZED, FOR SOLUTION INTRAVENOUS at 12:24

## 2024-10-31 RX ADMIN — FLUOROURACIL 3245 MILLIGRAM(S): 50 INJECTION, SOLUTION INTRAVENOUS at 16:38

## 2024-10-31 RX ADMIN — CALCIUM FOLINATE 340 MILLIGRAM(S): 10 INJECTION, SOLUTION INTRAMUSCULAR; INTRAVENOUS at 14:00

## 2024-10-31 RX ADMIN — OXALIPLATIN 115 MILLIGRAM(S): 5 INJECTION, SOLUTION INTRAVENOUS at 14:00

## 2024-11-01 ENCOUNTER — OUTPATIENT (OUTPATIENT)
Dept: OUTPATIENT SERVICES | Facility: HOSPITAL | Age: 49
LOS: 1 days | End: 2024-11-01
Payer: COMMERCIAL

## 2024-11-01 ENCOUNTER — APPOINTMENT (OUTPATIENT)
Dept: INFUSION THERAPY | Facility: CLINIC | Age: 49
End: 2024-11-01

## 2024-11-01 VITALS
TEMPERATURE: 99 F | RESPIRATION RATE: 18 BRPM | SYSTOLIC BLOOD PRESSURE: 144 MMHG | DIASTOLIC BLOOD PRESSURE: 87 MMHG | OXYGEN SATURATION: 97 % | HEART RATE: 74 BPM

## 2024-11-01 DIAGNOSIS — C16.9 MALIGNANT NEOPLASM OF STOMACH, UNSPECIFIED: ICD-10-CM

## 2024-11-01 DIAGNOSIS — Z98.890 OTHER SPECIFIED POSTPROCEDURAL STATES: Chronic | ICD-10-CM

## 2024-11-01 DIAGNOSIS — D50.9 IRON DEFICIENCY ANEMIA, UNSPECIFIED: ICD-10-CM

## 2024-11-01 PROCEDURE — 96523 IRRIG DRUG DELIVERY DEVICE: CPT

## 2024-11-01 RX ORDER — SODIUM CHLORIDE 9 MG/ML
10 INJECTION, SOLUTION INTRAMUSCULAR; INTRAVENOUS; SUBCUTANEOUS ONCE
Refills: 0 | Status: COMPLETED | OUTPATIENT
Start: 2024-11-01 | End: 2024-11-01

## 2024-11-01 RX ADMIN — FLUOROURACIL 3245 MILLIGRAM(S): 50 INJECTION, SOLUTION INTRAVENOUS at 17:00

## 2024-11-01 RX ADMIN — SODIUM CHLORIDE 10 MILLILITER(S): 9 INJECTION, SOLUTION INTRAMUSCULAR; INTRAVENOUS; SUBCUTANEOUS at 17:00

## 2024-11-04 PROBLEM — K92.2 ACUTE UPPER GI BLEED: Status: RESOLVED | Noted: 2024-08-28 | Resolved: 2024-11-04

## 2024-11-04 PROBLEM — Z87.898 HISTORY OF ABDOMINAL PAIN: Status: RESOLVED | Noted: 2024-07-10 | Resolved: 2024-11-04

## 2024-11-13 ENCOUNTER — APPOINTMENT (OUTPATIENT)
Dept: INFUSION THERAPY | Facility: CLINIC | Age: 49
End: 2024-11-13

## 2024-11-13 ENCOUNTER — OUTPATIENT (OUTPATIENT)
Dept: OUTPATIENT SERVICES | Facility: HOSPITAL | Age: 49
LOS: 1 days | End: 2024-11-13
Payer: COMMERCIAL

## 2024-11-13 ENCOUNTER — APPOINTMENT (OUTPATIENT)
Dept: HEMATOLOGY ONCOLOGY | Facility: CLINIC | Age: 49
End: 2024-11-13

## 2024-11-13 VITALS
WEIGHT: 130.07 LBS | TEMPERATURE: 98 F | DIASTOLIC BLOOD PRESSURE: 80 MMHG | OXYGEN SATURATION: 95 % | SYSTOLIC BLOOD PRESSURE: 134 MMHG | RESPIRATION RATE: 19 BRPM | HEIGHT: 67 IN | HEART RATE: 83 BPM

## 2024-11-13 VITALS
RESPIRATION RATE: 18 BRPM | SYSTOLIC BLOOD PRESSURE: 145 MMHG | TEMPERATURE: 98 F | OXYGEN SATURATION: 98 % | HEART RATE: 60 BPM | DIASTOLIC BLOOD PRESSURE: 90 MMHG

## 2024-11-13 VITALS — OXYGEN SATURATION: 95 % | HEART RATE: 83 BPM

## 2024-11-13 VITALS
HEART RATE: 45 BPM | BODY MASS INDEX: 20.53 KG/M2 | HEIGHT: 67 IN | TEMPERATURE: 98.1 F | RESPIRATION RATE: 18 BRPM | OXYGEN SATURATION: 74 % | SYSTOLIC BLOOD PRESSURE: 134 MMHG | WEIGHT: 130.8 LBS | DIASTOLIC BLOOD PRESSURE: 90 MMHG

## 2024-11-13 DIAGNOSIS — Z98.890 OTHER SPECIFIED POSTPROCEDURAL STATES: Chronic | ICD-10-CM

## 2024-11-13 DIAGNOSIS — C16.9 MALIGNANT NEOPLASM OF STOMACH, UNSPECIFIED: ICD-10-CM

## 2024-11-13 LAB
ALBUMIN SERPL ELPH-MCNC: 3.9 G/DL
ALP BLD-CCNC: 55 U/L
ALT SERPL-CCNC: 22 U/L
ANION GAP SERPL CALC-SCNC: 4 MMOL/L
AST SERPL-CCNC: 29 U/L
BILIRUB SERPL-MCNC: 0.5 MG/DL
BUN SERPL-MCNC: 10 MG/DL
CALCIUM SERPL-MCNC: 9.7 MG/DL
CHLORIDE SERPL-SCNC: 106 MMOL/L
CO2 SERPL-SCNC: 28 MMOL/L
CREAT SERPL-MCNC: 0.5 MG/DL
EGFR: 123 ML/MIN/1.73M2
GLUCOSE SERPL-MCNC: 159 MG/DL
HCT VFR BLD CALC: 42.6 %
HGB BLD-MCNC: 14 G/DL
LYMPHOCYTES # BLD AUTO: 0.7 K/UL
LYMPHOCYTES NFR BLD AUTO: 10 %
MAN DIFF?: NO
MCHC RBC-ENTMCNC: 29.2 PG
MCHC RBC-ENTMCNC: 32.9 G/DL
MCV RBC AUTO: 88.8 FL
NEUTROPHILS # BLD AUTO: 5.4 K/UL
NEUTROPHILS NFR BLD AUTO: 77.7 %
PLATELET # BLD AUTO: 286 K/UL
POTASSIUM SERPL-SCNC: 4.6 MMOL/L
PROT SERPL-MCNC: 6.7 G/DL
RBC # BLD: 4.8 M/UL
RBC # FLD: 14.9 %
SODIUM SERPL-SCNC: 138 MMOL/L
WBC # FLD AUTO: 7 K/UL

## 2024-11-13 PROCEDURE — 96416 CHEMO PROLONG INFUSE W/PUMP: CPT

## 2024-11-13 PROCEDURE — 96417 CHEMO IV INFUS EACH ADDL SEQ: CPT

## 2024-11-13 PROCEDURE — 96375 TX/PRO/DX INJ NEW DRUG ADDON: CPT

## 2024-11-13 PROCEDURE — 36415 COLL VENOUS BLD VENIPUNCTURE: CPT

## 2024-11-13 PROCEDURE — 99214 OFFICE O/P EST MOD 30 MIN: CPT | Mod: 25

## 2024-11-13 PROCEDURE — 96413 CHEMO IV INFUSION 1 HR: CPT

## 2024-11-13 PROCEDURE — 96367 TX/PROPH/DG ADDL SEQ IV INF: CPT

## 2024-11-13 RX ORDER — LIDOCAINE HYDROCHLORIDE 40 MG/ML
1 SOLUTION TOPICAL ONCE
Refills: 0 | Status: COMPLETED | OUTPATIENT
Start: 2024-11-13 | End: 2024-11-13

## 2024-11-13 RX ORDER — FLUOROURACIL 50 MG/ML
4420 INJECTION, SOLUTION INTRAVENOUS ONCE
Refills: 0 | Status: COMPLETED | OUTPATIENT
Start: 2024-11-13 | End: 2024-11-13

## 2024-11-13 RX ORDER — PALONOSETRON HYDROCHLORIDE 0.05 MG/ML
0.25 INJECTION INTRAVENOUS ONCE
Refills: 0 | Status: COMPLETED | OUTPATIENT
Start: 2024-11-13 | End: 2024-11-13

## 2024-11-13 RX ORDER — OXALIPLATIN 5 MG/ML
140 INJECTION, SOLUTION INTRAVENOUS ONCE
Refills: 0 | Status: COMPLETED | OUTPATIENT
Start: 2024-11-13 | End: 2024-11-13

## 2024-11-13 RX ORDER — DOCETAXEL 80 MG/4ML
85 INJECTION INTRAVENOUS ONCE
Refills: 0 | Status: COMPLETED | OUTPATIENT
Start: 2024-11-13 | End: 2024-11-13

## 2024-11-13 RX ORDER — CALCIUM FOLINATE 10 MG/ML
340 INJECTION, SOLUTION INTRAMUSCULAR; INTRAVENOUS ONCE
Refills: 0 | Status: COMPLETED | OUTPATIENT
Start: 2024-11-13 | End: 2024-11-13

## 2024-11-13 RX ORDER — FOSAPREPITANT 150 MG/5ML
150 INJECTION, POWDER, LYOPHILIZED, FOR SOLUTION INTRAVENOUS ONCE
Refills: 0 | Status: COMPLETED | OUTPATIENT
Start: 2024-11-13 | End: 2024-11-13

## 2024-11-13 RX ADMIN — FOSAPREPITANT 150 MILLIGRAM(S): 150 INJECTION, POWDER, LYOPHILIZED, FOR SOLUTION INTRAVENOUS at 14:20

## 2024-11-13 RX ADMIN — OXALIPLATIN 140 MILLIGRAM(S): 5 INJECTION, SOLUTION INTRAVENOUS at 15:45

## 2024-11-13 RX ADMIN — DOCETAXEL 85 MILLIGRAM(S): 80 INJECTION INTRAVENOUS at 15:45

## 2024-11-13 RX ADMIN — PALONOSETRON HYDROCHLORIDE 0.25 MILLIGRAM(S): 0.05 INJECTION INTRAVENOUS at 13:48

## 2024-11-13 RX ADMIN — DOCETAXEL 85 MILLIGRAM(S): 80 INJECTION INTRAVENOUS at 14:45

## 2024-11-13 RX ADMIN — FOSAPREPITANT 500 MILLIGRAM(S): 150 INJECTION, POWDER, LYOPHILIZED, FOR SOLUTION INTRAVENOUS at 13:50

## 2024-11-13 RX ADMIN — FLUOROURACIL 4420 MILLIGRAM(S): 50 INJECTION, SOLUTION INTRAVENOUS at 14:44

## 2024-11-13 RX ADMIN — CALCIUM FOLINATE 340 MILLIGRAM(S): 10 INJECTION, SOLUTION INTRAMUSCULAR; INTRAVENOUS at 15:45

## 2024-11-14 ENCOUNTER — OUTPATIENT (OUTPATIENT)
Dept: OUTPATIENT SERVICES | Facility: HOSPITAL | Age: 49
LOS: 1 days | End: 2024-11-14

## 2024-11-14 ENCOUNTER — APPOINTMENT (OUTPATIENT)
Dept: INFUSION THERAPY | Facility: CLINIC | Age: 49
End: 2024-11-14

## 2024-11-14 DIAGNOSIS — Z98.890 OTHER SPECIFIED POSTPROCEDURAL STATES: Chronic | ICD-10-CM

## 2024-11-15 DIAGNOSIS — C16.9 MALIGNANT NEOPLASM OF STOMACH, UNSPECIFIED: ICD-10-CM

## 2024-11-26 ENCOUNTER — OUTPATIENT (OUTPATIENT)
Dept: OUTPATIENT SERVICES | Facility: HOSPITAL | Age: 49
LOS: 1 days | End: 2024-11-26
Payer: COMMERCIAL

## 2024-11-26 ENCOUNTER — APPOINTMENT (OUTPATIENT)
Dept: INFUSION THERAPY | Facility: CLINIC | Age: 49
End: 2024-11-26

## 2024-11-26 VITALS
TEMPERATURE: 98 F | SYSTOLIC BLOOD PRESSURE: 129 MMHG | RESPIRATION RATE: 18 BRPM | DIASTOLIC BLOOD PRESSURE: 84 MMHG | HEART RATE: 77 BPM | OXYGEN SATURATION: 98 %

## 2024-11-26 VITALS
OXYGEN SATURATION: 98 % | RESPIRATION RATE: 18 BRPM | HEIGHT: 67 IN | WEIGHT: 132.06 LBS | SYSTOLIC BLOOD PRESSURE: 177 MMHG | DIASTOLIC BLOOD PRESSURE: 96 MMHG | HEART RATE: 70 BPM | TEMPERATURE: 98 F

## 2024-11-26 DIAGNOSIS — C16.9 MALIGNANT NEOPLASM OF STOMACH, UNSPECIFIED: ICD-10-CM

## 2024-11-26 DIAGNOSIS — Z98.890 OTHER SPECIFIED POSTPROCEDURAL STATES: Chronic | ICD-10-CM

## 2024-11-26 LAB
ALBUMIN SERPL ELPH-MCNC: 3.9 G/DL — SIGNIFICANT CHANGE UP (ref 3.3–5)
ALP SERPL-CCNC: 56 U/L — SIGNIFICANT CHANGE UP (ref 40–120)
ALT FLD-CCNC: 40 U/L — SIGNIFICANT CHANGE UP (ref 10–45)
ANION GAP SERPL CALC-SCNC: 2 MMOL/L — LOW (ref 5–17)
AST SERPL-CCNC: 40 U/L — SIGNIFICANT CHANGE UP (ref 10–40)
BILIRUB SERPL-MCNC: 0.5 MG/DL — SIGNIFICANT CHANGE UP (ref 0.2–1.2)
BUN SERPL-MCNC: 11 MG/DL — SIGNIFICANT CHANGE UP (ref 7–23)
CALCIUM SERPL-MCNC: 10.3 MG/DL — SIGNIFICANT CHANGE UP (ref 8.4–10.5)
CHLORIDE SERPL-SCNC: 109 MMOL/L — HIGH (ref 96–108)
CO2 SERPL-SCNC: 29 MMOL/L — SIGNIFICANT CHANGE UP (ref 22–31)
CREAT SERPL-MCNC: 0.7 MG/DL — SIGNIFICANT CHANGE UP (ref 0.5–1.3)
EGFR: 112 ML/MIN/1.73M2 — SIGNIFICANT CHANGE UP
GLUCOSE SERPL-MCNC: 231 MG/DL — HIGH (ref 70–99)
HCT VFR BLD CALC: 40.1 % — SIGNIFICANT CHANGE UP (ref 39–50)
HGB BLD-MCNC: 13.3 G/DL — SIGNIFICANT CHANGE UP (ref 13–17)
LYMPHOCYTES # BLD AUTO: 0.5 K/UL — LOW (ref 1–3.3)
LYMPHOCYTES # BLD AUTO: 21.6 % — SIGNIFICANT CHANGE UP (ref 13–44)
MCHC RBC-ENTMCNC: 29.4 PG — SIGNIFICANT CHANGE UP (ref 27–34)
MCHC RBC-ENTMCNC: 33.2 G/DL — SIGNIFICANT CHANGE UP (ref 32–36)
MCV RBC AUTO: 88.5 FL — SIGNIFICANT CHANGE UP (ref 80–100)
NEUTROPHILS # BLD AUTO: 1 K/UL — LOW (ref 1.8–7.4)
NEUTROPHILS NFR BLD AUTO: 47.6 % — SIGNIFICANT CHANGE UP (ref 43–77)
PLATELET # BLD AUTO: 200 K/UL — SIGNIFICANT CHANGE UP (ref 150–400)
POTASSIUM SERPL-MCNC: 4.4 MMOL/L — SIGNIFICANT CHANGE UP (ref 3.5–5.3)
POTASSIUM SERPL-SCNC: 4.4 MMOL/L — SIGNIFICANT CHANGE UP (ref 3.5–5.3)
PROT SERPL-MCNC: 6.4 G/DL — SIGNIFICANT CHANGE UP (ref 6–8.3)
RBC # BLD: 4.53 M/UL — SIGNIFICANT CHANGE UP (ref 4.2–5.8)
RBC # FLD: 15.1 % — HIGH (ref 10.3–14.5)
SODIUM SERPL-SCNC: 140 MMOL/L — SIGNIFICANT CHANGE UP (ref 135–145)
WBC # BLD: 2.2 K/UL — LOW (ref 3.8–10.5)
WBC # FLD AUTO: 2.2 K/UL — LOW (ref 3.8–10.5)

## 2024-11-26 PROCEDURE — 96416 CHEMO PROLONG INFUSE W/PUMP: CPT

## 2024-11-26 PROCEDURE — 36415 COLL VENOUS BLD VENIPUNCTURE: CPT

## 2024-11-26 PROCEDURE — 96413 CHEMO IV INFUSION 1 HR: CPT

## 2024-11-26 PROCEDURE — 85025 COMPLETE CBC W/AUTO DIFF WBC: CPT

## 2024-11-26 PROCEDURE — 96367 TX/PROPH/DG ADDL SEQ IV INF: CPT

## 2024-11-26 PROCEDURE — 80053 COMPREHEN METABOLIC PANEL: CPT

## 2024-11-26 PROCEDURE — 96375 TX/PRO/DX INJ NEW DRUG ADDON: CPT

## 2024-11-26 PROCEDURE — 96417 CHEMO IV INFUS EACH ADDL SEQ: CPT

## 2024-11-26 RX ORDER — OXALIPLATIN 5 MG/ML
140 INJECTION, SOLUTION, CONCENTRATE INTRAVENOUS ONCE
Refills: 0 | Status: COMPLETED | OUTPATIENT
Start: 2024-11-26 | End: 2024-11-26

## 2024-11-26 RX ORDER — FLUOROURACIL 50 MG/ML
4420 VIAL (ML) INTRAVENOUS ONCE
Refills: 0 | Status: COMPLETED | OUTPATIENT
Start: 2024-11-26 | End: 2024-11-26

## 2024-11-26 RX ORDER — PALONOSETRON HYDROCHLORIDE 0.25 MG/5ML
0.25 INJECTION INTRAVENOUS ONCE
Refills: 0 | Status: COMPLETED | OUTPATIENT
Start: 2024-11-26 | End: 2024-11-26

## 2024-11-26 RX ORDER — LIDOCAINE 40 MG/G
1 CREAM TOPICAL ONCE
Refills: 0 | Status: COMPLETED | OUTPATIENT
Start: 2024-11-26 | End: 2024-11-26

## 2024-11-26 RX ORDER — FOSAPREPITANT 150 MG/5ML
150 INJECTION, POWDER, LYOPHILIZED, FOR SOLUTION INTRAVENOUS ONCE
Refills: 0 | Status: COMPLETED | OUTPATIENT
Start: 2024-11-26 | End: 2024-11-26

## 2024-11-26 RX ORDER — LEUCOVORIN CALCIUM 15 MG/1
340 TABLET ORAL ONCE
Refills: 0 | Status: COMPLETED | OUTPATIENT
Start: 2024-11-26 | End: 2024-11-26

## 2024-11-26 RX ORDER — DOCETAXEL 160 MG/8ML
85 INJECTION, SOLUTION INTRAVENOUS ONCE
Refills: 0 | Status: COMPLETED | OUTPATIENT
Start: 2024-11-26 | End: 2024-11-26

## 2024-11-26 RX ADMIN — Medication 4420 MILLIGRAM(S): at 15:50

## 2024-11-26 RX ADMIN — DOCETAXEL 85 MILLIGRAM(S): 160 INJECTION, SOLUTION INTRAVENOUS at 13:30

## 2024-11-26 RX ADMIN — OXALIPLATIN 140 MILLIGRAM(S): 5 INJECTION, SOLUTION, CONCENTRATE INTRAVENOUS at 15:35

## 2024-11-26 RX ADMIN — LEUCOVORIN CALCIUM 340 MILLIGRAM(S): 15 TABLET ORAL at 13:35

## 2024-11-26 RX ADMIN — FOSAPREPITANT 500 MILLIGRAM(S): 150 INJECTION, POWDER, LYOPHILIZED, FOR SOLUTION INTRAVENOUS at 11:42

## 2024-11-26 RX ADMIN — OXALIPLATIN 140 MILLIGRAM(S): 5 INJECTION, SOLUTION, CONCENTRATE INTRAVENOUS at 13:35

## 2024-11-26 RX ADMIN — LEUCOVORIN CALCIUM 340 MILLIGRAM(S): 15 TABLET ORAL at 15:35

## 2024-11-26 RX ADMIN — DOCETAXEL 85 MILLIGRAM(S): 160 INJECTION, SOLUTION INTRAVENOUS at 12:30

## 2024-11-26 RX ADMIN — LIDOCAINE 1 APPLICATION(S): 40 CREAM TOPICAL at 10:20

## 2024-11-26 RX ADMIN — FOSAPREPITANT 150 MILLIGRAM(S): 150 INJECTION, POWDER, LYOPHILIZED, FOR SOLUTION INTRAVENOUS at 12:12

## 2024-11-26 RX ADMIN — PALONOSETRON HYDROCHLORIDE 0.25 MILLIGRAM(S): 0.25 INJECTION INTRAVENOUS at 11:40

## 2024-11-26 NOTE — CHART NOTE - NSCHARTNOTEFT_GEN_A_CORE
HPI: 49M PMH gastric Ca on FLOT here today for cycle 6. He reports feeling well. Has been eating and drinking well. Denies any pain, N/V, fevers, chills, dyspnea, dizziness.     Exam  VITALS per flowsheet  General: well appearing, NAD  ENT: MMM  Skin: No rashes  Pulm: CTAB  CV: RRR  Abd: Soft and nontender  Extrem: Moving all extremities  Neuro: A&O x 3    A/P:  49M PMH gastric Ca on FLOT here today for cycle 6.     Labs HPI: 49M PMH gastric Ca on FLOT here today for cycle 6. He reports feeling well. Has been eating and drinking well. Denies any pain, N/V, fevers, chills, dyspnea, dizziness. Has been taking his dexamethasone as prescribed.     Exam  VITALS per flowsheet  General: well appearing, NAD  ENT: MMM  Skin: No rashes  Pulm: CTAB  CV: RRR  Abd: Soft and nontender  Extrem: Moving all extremities  Neuro: A&O x 3    A/P:  49M PMH gastric Ca on FLOT here today for cycle 6.     Labs reviewed and ANC notable for 1.0. Mild hyperglycemia likely exacerbated by dex.  Discussed with Dr. Pereira and ok to proceed with chemo today. Will review neutropenic precautions with patient.

## 2024-11-27 ENCOUNTER — APPOINTMENT (OUTPATIENT)
Dept: INFUSION THERAPY | Facility: CLINIC | Age: 49
End: 2024-11-27

## 2024-11-27 ENCOUNTER — OUTPATIENT (OUTPATIENT)
Dept: OUTPATIENT SERVICES | Facility: HOSPITAL | Age: 49
LOS: 1 days | End: 2024-11-27

## 2024-11-27 ENCOUNTER — NON-APPOINTMENT (OUTPATIENT)
Age: 49
End: 2024-11-27

## 2024-11-27 VITALS
WEIGHT: 132.06 LBS | RESPIRATION RATE: 18 BRPM | TEMPERATURE: 98 F | HEIGHT: 67 IN | SYSTOLIC BLOOD PRESSURE: 147 MMHG | OXYGEN SATURATION: 97 % | HEART RATE: 72 BPM | DIASTOLIC BLOOD PRESSURE: 89 MMHG

## 2024-11-27 DIAGNOSIS — Z98.890 OTHER SPECIFIED POSTPROCEDURAL STATES: Chronic | ICD-10-CM

## 2024-11-27 DIAGNOSIS — C15.9 MALIGNANT NEOPLASM OF ESOPHAGUS, UNSPECIFIED: ICD-10-CM

## 2024-12-12 ENCOUNTER — APPOINTMENT (OUTPATIENT)
Dept: INFUSION THERAPY | Facility: CLINIC | Age: 49
End: 2024-12-12

## 2024-12-12 ENCOUNTER — OUTPATIENT (OUTPATIENT)
Dept: OUTPATIENT SERVICES | Facility: HOSPITAL | Age: 49
LOS: 1 days | End: 2024-12-12
Payer: COMMERCIAL

## 2024-12-12 ENCOUNTER — APPOINTMENT (OUTPATIENT)
Dept: HEMATOLOGY ONCOLOGY | Facility: CLINIC | Age: 49
End: 2024-12-12
Payer: COMMERCIAL

## 2024-12-12 ENCOUNTER — LABORATORY RESULT (OUTPATIENT)
Age: 49
End: 2024-12-12

## 2024-12-12 VITALS
HEART RATE: 80 BPM | RESPIRATION RATE: 18 BRPM | TEMPERATURE: 98.6 F | WEIGHT: 133 LBS | SYSTOLIC BLOOD PRESSURE: 161 MMHG | BODY MASS INDEX: 20.88 KG/M2 | DIASTOLIC BLOOD PRESSURE: 93 MMHG | HEIGHT: 67 IN | OXYGEN SATURATION: 95 %

## 2024-12-12 VITALS
HEART RATE: 78 BPM | RESPIRATION RATE: 18 BRPM | DIASTOLIC BLOOD PRESSURE: 98 MMHG | OXYGEN SATURATION: 98 % | HEIGHT: 67 IN | WEIGHT: 132.94 LBS | TEMPERATURE: 98 F | SYSTOLIC BLOOD PRESSURE: 164 MMHG

## 2024-12-12 VITALS
SYSTOLIC BLOOD PRESSURE: 155 MMHG | TEMPERATURE: 98 F | OXYGEN SATURATION: 96 % | RESPIRATION RATE: 18 BRPM | HEART RATE: 67 BPM | DIASTOLIC BLOOD PRESSURE: 86 MMHG

## 2024-12-12 DIAGNOSIS — C16.9 MALIGNANT NEOPLASM OF STOMACH, UNSPECIFIED: ICD-10-CM

## 2024-12-12 DIAGNOSIS — Z98.890 OTHER SPECIFIED POSTPROCEDURAL STATES: Chronic | ICD-10-CM

## 2024-12-12 DIAGNOSIS — C15.9 MALIGNANT NEOPLASM OF ESOPHAGUS, UNSPECIFIED: ICD-10-CM

## 2024-12-12 DIAGNOSIS — I10 ESSENTIAL (PRIMARY) HYPERTENSION: ICD-10-CM

## 2024-12-12 LAB
ALBUMIN SERPL ELPH-MCNC: 3.8 G/DL
ALP BLD-CCNC: 50 U/L
ALT SERPL-CCNC: 27 U/L
ANION GAP SERPL CALC-SCNC: 1 MMOL/L
AST SERPL-CCNC: 34 U/L
BASOPHILS # BLD AUTO: 0 K/UL
BASOPHILS NFR BLD AUTO: 0 %
BILIRUB SERPL-MCNC: 0.5 MG/DL
BUN SERPL-MCNC: 12 MG/DL
CALCIUM SERPL-MCNC: 10.3 MG/DL
CHLORIDE SERPL-SCNC: 108 MMOL/L
CO2 SERPL-SCNC: 30 MMOL/L
CREAT SERPL-MCNC: 0.7 MG/DL
EGFR: 112 ML/MIN/1.73M2
EOSINOPHIL # BLD AUTO: 0 K/UL
EOSINOPHIL NFR BLD AUTO: 0 %
GLUCOSE SERPL-MCNC: 171 MG/DL
HCT VFR BLD CALC: 41.2 %
HGB BLD-MCNC: 13.8 G/DL
LYMPHOCYTES # BLD AUTO: 0.98 K/UL
LYMPHOCYTES NFR BLD AUTO: 9 %
MAN DIFF?: NORMAL
MCHC RBC-ENTMCNC: 29.3 PG
MCHC RBC-ENTMCNC: 33.5 G/DL
MCV RBC AUTO: 87.5 FL
MONOCYTES # BLD AUTO: 1.3 K/UL
MONOCYTES NFR BLD AUTO: 12 %
NEUTROPHILS # BLD AUTO: 8.46 K/UL
NEUTROPHILS NFR BLD AUTO: 78 %
PLATELET # BLD AUTO: 290 K/UL
POTASSIUM SERPL-SCNC: 4.9 MMOL/L
PROT SERPL-MCNC: 6.9 G/DL
RBC # BLD: 4.71 M/UL
RBC # FLD: 14.9 %
SODIUM SERPL-SCNC: 139 MMOL/L
WBC # FLD AUTO: 10.85 K/UL

## 2024-12-12 PROCEDURE — 96375 TX/PRO/DX INJ NEW DRUG ADDON: CPT

## 2024-12-12 PROCEDURE — 96416 CHEMO PROLONG INFUSE W/PUMP: CPT

## 2024-12-12 PROCEDURE — 96417 CHEMO IV INFUS EACH ADDL SEQ: CPT

## 2024-12-12 PROCEDURE — 99214 OFFICE O/P EST MOD 30 MIN: CPT | Mod: 25

## 2024-12-12 PROCEDURE — 96413 CHEMO IV INFUSION 1 HR: CPT

## 2024-12-12 PROCEDURE — 36415 COLL VENOUS BLD VENIPUNCTURE: CPT

## 2024-12-12 PROCEDURE — 96367 TX/PROPH/DG ADDL SEQ IV INF: CPT

## 2024-12-12 RX ORDER — LEUCOVORIN CALCIUM 15 MG/1
340 TABLET ORAL ONCE
Refills: 0 | Status: COMPLETED | OUTPATIENT
Start: 2024-12-12 | End: 2024-12-12

## 2024-12-12 RX ORDER — DOCETAXEL 160 MG/8ML
85 INJECTION, SOLUTION INTRAVENOUS ONCE
Refills: 0 | Status: COMPLETED | OUTPATIENT
Start: 2024-12-12 | End: 2024-12-12

## 2024-12-12 RX ORDER — LIDOCAINE 40 MG/G
1 CREAM TOPICAL ONCE
Refills: 0 | Status: COMPLETED | OUTPATIENT
Start: 2024-12-12 | End: 2024-12-12

## 2024-12-12 RX ORDER — PALONOSETRON HYDROCHLORIDE 0.25 MG/5ML
0.25 INJECTION INTRAVENOUS ONCE
Refills: 0 | Status: COMPLETED | OUTPATIENT
Start: 2024-12-12 | End: 2024-12-12

## 2024-12-12 RX ORDER — OXALIPLATIN 5 MG/ML
140 INJECTION, SOLUTION, CONCENTRATE INTRAVENOUS ONCE
Refills: 0 | Status: COMPLETED | OUTPATIENT
Start: 2024-12-12 | End: 2024-12-12

## 2024-12-12 RX ORDER — FLUOROURACIL 50 MG/ML
4420 VIAL (ML) INTRAVENOUS ONCE
Refills: 0 | Status: COMPLETED | OUTPATIENT
Start: 2024-12-12 | End: 2024-12-12

## 2024-12-12 RX ORDER — FOSAPREPITANT 150 MG/5ML
150 INJECTION, POWDER, LYOPHILIZED, FOR SOLUTION INTRAVENOUS ONCE
Refills: 0 | Status: COMPLETED | OUTPATIENT
Start: 2024-12-12 | End: 2024-12-12

## 2024-12-12 RX ADMIN — LEUCOVORIN CALCIUM 340 MILLIGRAM(S): 15 TABLET ORAL at 14:34

## 2024-12-12 RX ADMIN — DOCETAXEL 85 MILLIGRAM(S): 160 INJECTION, SOLUTION INTRAVENOUS at 14:34

## 2024-12-12 RX ADMIN — OXALIPLATIN 140 MILLIGRAM(S): 5 INJECTION, SOLUTION, CONCENTRATE INTRAVENOUS at 14:34

## 2024-12-12 RX ADMIN — FOSAPREPITANT 150 MILLIGRAM(S): 150 INJECTION, POWDER, LYOPHILIZED, FOR SOLUTION INTRAVENOUS at 13:06

## 2024-12-12 RX ADMIN — Medication 4420 MILLIGRAM(S): at 16:51

## 2024-12-12 RX ADMIN — FOSAPREPITANT 500 MILLIGRAM(S): 150 INJECTION, POWDER, LYOPHILIZED, FOR SOLUTION INTRAVENOUS at 12:36

## 2024-12-12 RX ADMIN — DOCETAXEL 85 MILLIGRAM(S): 160 INJECTION, SOLUTION INTRAVENOUS at 13:29

## 2024-12-12 RX ADMIN — LEUCOVORIN CALCIUM 340 MILLIGRAM(S): 15 TABLET ORAL at 16:40

## 2024-12-12 RX ADMIN — OXALIPLATIN 140 MILLIGRAM(S): 5 INJECTION, SOLUTION, CONCENTRATE INTRAVENOUS at 16:40

## 2024-12-12 RX ADMIN — PALONOSETRON HYDROCHLORIDE 0.25 MILLIGRAM(S): 0.25 INJECTION INTRAVENOUS at 12:35

## 2024-12-12 NOTE — PHARMACY COMMUNICATION NOTE - COMMENTS
As per Magen Perez & the chemotherapy communication order in the patient clearance chat: The patient is cleared for FLOT today 12/12/24. The diff is pending, WBCs = 10.85, proceed with treatment for today.

## 2024-12-13 ENCOUNTER — APPOINTMENT (OUTPATIENT)
Dept: INFUSION THERAPY | Facility: CLINIC | Age: 49
End: 2024-12-13

## 2024-12-13 ENCOUNTER — OUTPATIENT (OUTPATIENT)
Dept: OUTPATIENT SERVICES | Facility: HOSPITAL | Age: 49
LOS: 1 days | End: 2024-12-13
Payer: COMMERCIAL

## 2024-12-13 VITALS
TEMPERATURE: 98 F | RESPIRATION RATE: 15 BRPM | OXYGEN SATURATION: 98 % | HEART RATE: 68 BPM | SYSTOLIC BLOOD PRESSURE: 158 MMHG | DIASTOLIC BLOOD PRESSURE: 86 MMHG

## 2024-12-13 DIAGNOSIS — Z98.890 OTHER SPECIFIED POSTPROCEDURAL STATES: Chronic | ICD-10-CM

## 2024-12-13 DIAGNOSIS — C15.9 MALIGNANT NEOPLASM OF ESOPHAGUS, UNSPECIFIED: ICD-10-CM

## 2024-12-13 PROCEDURE — 96523 IRRIG DRUG DELIVERY DEVICE: CPT

## 2024-12-13 RX ORDER — SODIUM CHLORIDE 9 MG/ML
10 INJECTION, SOLUTION INTRAMUSCULAR; INTRAVENOUS; SUBCUTANEOUS ONCE
Refills: 0 | Status: COMPLETED | OUTPATIENT
Start: 2024-12-13 | End: 2024-12-13

## 2024-12-13 RX ADMIN — Medication 4420 MILLIGRAM(S): at 17:20

## 2024-12-13 RX ADMIN — SODIUM CHLORIDE 10 MILLILITER(S): 9 INJECTION, SOLUTION INTRAMUSCULAR; INTRAVENOUS; SUBCUTANEOUS at 17:45

## 2024-12-26 ENCOUNTER — APPOINTMENT (OUTPATIENT)
Dept: HEMATOLOGY ONCOLOGY | Facility: CLINIC | Age: 49
End: 2024-12-26
Payer: COMMERCIAL

## 2024-12-26 ENCOUNTER — APPOINTMENT (OUTPATIENT)
Dept: INFUSION THERAPY | Facility: CLINIC | Age: 49
End: 2024-12-26

## 2024-12-26 ENCOUNTER — OUTPATIENT (OUTPATIENT)
Dept: OUTPATIENT SERVICES | Facility: HOSPITAL | Age: 49
LOS: 1 days | End: 2024-12-26
Payer: COMMERCIAL

## 2024-12-26 VITALS
SYSTOLIC BLOOD PRESSURE: 169 MMHG | RESPIRATION RATE: 16 BRPM | DIASTOLIC BLOOD PRESSURE: 93 MMHG | TEMPERATURE: 98 F | HEART RATE: 71 BPM | OXYGEN SATURATION: 97 %

## 2024-12-26 VITALS
HEIGHT: 67 IN | RESPIRATION RATE: 16 BRPM | OXYGEN SATURATION: 97 % | TEMPERATURE: 99 F | WEIGHT: 132.94 LBS | HEART RATE: 90 BPM | SYSTOLIC BLOOD PRESSURE: 148 MMHG | DIASTOLIC BLOOD PRESSURE: 92 MMHG

## 2024-12-26 VITALS
BODY MASS INDEX: 20.88 KG/M2 | WEIGHT: 133 LBS | SYSTOLIC BLOOD PRESSURE: 145 MMHG | DIASTOLIC BLOOD PRESSURE: 93 MMHG | RESPIRATION RATE: 18 BRPM | TEMPERATURE: 97.9 F | OXYGEN SATURATION: 97 % | HEART RATE: 90 BPM | HEIGHT: 67 IN

## 2024-12-26 DIAGNOSIS — Z87.898 PERSONAL HISTORY OF OTHER SPECIFIED CONDITIONS: ICD-10-CM

## 2024-12-26 DIAGNOSIS — Z98.890 OTHER SPECIFIED POSTPROCEDURAL STATES: Chronic | ICD-10-CM

## 2024-12-26 DIAGNOSIS — E11.9 TYPE 2 DIABETES MELLITUS W/OUT COMPLICATIONS: ICD-10-CM

## 2024-12-26 DIAGNOSIS — C15.9 MALIGNANT NEOPLASM OF ESOPHAGUS, UNSPECIFIED: ICD-10-CM

## 2024-12-26 DIAGNOSIS — K52.1 TOXIC GASTROENTERITIS AND COLITIS: ICD-10-CM

## 2024-12-26 DIAGNOSIS — R11.2 NAUSEA WITH VOMITING, UNSPECIFIED: ICD-10-CM

## 2024-12-26 DIAGNOSIS — T45.1X5A TOXIC GASTROENTERITIS AND COLITIS: ICD-10-CM

## 2024-12-26 DIAGNOSIS — D50.0 IRON DEFICIENCY ANEMIA SECONDARY TO BLOOD LOSS (CHRONIC): ICD-10-CM

## 2024-12-26 DIAGNOSIS — T45.1X5A NAUSEA WITH VOMITING, UNSPECIFIED: ICD-10-CM

## 2024-12-26 DIAGNOSIS — C16.9 MALIGNANT NEOPLASM OF STOMACH, UNSPECIFIED: ICD-10-CM

## 2024-12-26 LAB
ALBUMIN SERPL ELPH-MCNC: 3.7 G/DL
ALP BLD-CCNC: 53 U/L
ALT SERPL-CCNC: 25 U/L
ANION GAP SERPL CALC-SCNC: 2 MMOL/L
APTT BLD: 30.4 SEC
AST SERPL-CCNC: 34 U/L
BILIRUB SERPL-MCNC: 0.7 MG/DL
BUN SERPL-MCNC: 12 MG/DL
CALCIUM SERPL-MCNC: 10 MG/DL
CHLORIDE SERPL-SCNC: 108 MMOL/L
CO2 SERPL-SCNC: 29 MMOL/L
CREAT SERPL-MCNC: 0.6 MG/DL
EGFR: 117 ML/MIN/1.73M2
GLUCOSE SERPL-MCNC: 347 MG/DL
HCT VFR BLD CALC: 40.5 %
HGB BLD-MCNC: 13.8 G/DL
INR PPP: 1
LYMPHOCYTES # BLD AUTO: 0.6 K/UL
LYMPHOCYTES NFR BLD AUTO: 13.3 %
MAN DIFF?: NO
MCHC RBC-ENTMCNC: 29 PG
MCHC RBC-ENTMCNC: 34.1 G/DL
MCV RBC AUTO: 85.1 FL
NEUTROPHILS # BLD AUTO: 3.1 K/UL
NEUTROPHILS NFR BLD AUTO: 73.1 %
PLATELET # BLD AUTO: 195 K/UL
POTASSIUM SERPL-SCNC: 5.1 MMOL/L
PROT SERPL-MCNC: 6.7 G/DL
PT BLD: 11.7 SEC
RBC # BLD: 4.76 M/UL
RBC # FLD: 15.6 %
SODIUM SERPL-SCNC: 139 MMOL/L
WBC # FLD AUTO: 4.3 K/UL

## 2024-12-26 PROCEDURE — 96367 TX/PROPH/DG ADDL SEQ IV INF: CPT

## 2024-12-26 PROCEDURE — 96413 CHEMO IV INFUSION 1 HR: CPT

## 2024-12-26 PROCEDURE — 96375 TX/PRO/DX INJ NEW DRUG ADDON: CPT

## 2024-12-26 PROCEDURE — 99214 OFFICE O/P EST MOD 30 MIN: CPT | Mod: 25

## 2024-12-26 PROCEDURE — 96417 CHEMO IV INFUS EACH ADDL SEQ: CPT

## 2024-12-26 PROCEDURE — 96416 CHEMO PROLONG INFUSE W/PUMP: CPT

## 2024-12-26 PROCEDURE — 36415 COLL VENOUS BLD VENIPUNCTURE: CPT

## 2024-12-26 RX ORDER — FLUOROURACIL 50 MG/ML
4420 VIAL (ML) INTRAVENOUS ONCE
Refills: 0 | Status: COMPLETED | OUTPATIENT
Start: 2024-12-26 | End: 2024-12-26

## 2024-12-26 RX ORDER — OXALIPLATIN 5 MG/ML
140 INJECTION, SOLUTION INTRAVENOUS ONCE
Refills: 0 | Status: COMPLETED | OUTPATIENT
Start: 2024-12-26 | End: 2024-12-26

## 2024-12-26 RX ORDER — PALONOSETRON HYDROCHLORIDE 0.25 MG/5ML
0.25 INJECTION INTRAVENOUS ONCE
Refills: 0 | Status: COMPLETED | OUTPATIENT
Start: 2024-12-26 | End: 2024-12-26

## 2024-12-26 RX ORDER — LIDOCAINE 50 MG/G
1 OINTMENT TOPICAL ONCE
Refills: 0 | Status: COMPLETED | OUTPATIENT
Start: 2024-12-26 | End: 2024-12-26

## 2024-12-26 RX ORDER — LEUCOVORIN CALCIUM 10 MG/ML
340 INJECTION INTRAMUSCULAR; INTRAVENOUS ONCE
Refills: 0 | Status: COMPLETED | OUTPATIENT
Start: 2024-12-26 | End: 2024-12-26

## 2024-12-26 RX ORDER — FOSAPREPITANT DIMEGLUMINE 150 MG/5ML
150 INJECTION, POWDER, LYOPHILIZED, FOR SOLUTION INTRAVENOUS ONCE
Refills: 0 | Status: COMPLETED | OUTPATIENT
Start: 2024-12-26 | End: 2024-12-26

## 2024-12-26 RX ORDER — DOCETAXEL 10 MG/ML
85 INJECTION, SOLUTION INTRAVENOUS ONCE
Refills: 0 | Status: COMPLETED | OUTPATIENT
Start: 2024-12-26 | End: 2024-12-26

## 2024-12-26 RX ADMIN — LEUCOVORIN CALCIUM 340 MILLIGRAM(S): 10 INJECTION INTRAMUSCULAR; INTRAVENOUS at 17:30

## 2024-12-26 RX ADMIN — OXALIPLATIN 140 MILLIGRAM(S): 5 INJECTION, SOLUTION INTRAVENOUS at 15:30

## 2024-12-26 RX ADMIN — DOCETAXEL 85 MILLIGRAM(S): 10 INJECTION, SOLUTION INTRAVENOUS at 14:24

## 2024-12-26 RX ADMIN — Medication 4420 MILLIGRAM(S): at 17:44

## 2024-12-26 RX ADMIN — OXALIPLATIN 140 MILLIGRAM(S): 5 INJECTION, SOLUTION INTRAVENOUS at 17:30

## 2024-12-26 RX ADMIN — FOSAPREPITANT DIMEGLUMINE 150 MILLIGRAM(S): 150 INJECTION, POWDER, LYOPHILIZED, FOR SOLUTION INTRAVENOUS at 14:02

## 2024-12-26 RX ADMIN — DOCETAXEL 85 MILLIGRAM(S): 10 INJECTION, SOLUTION INTRAVENOUS at 15:24

## 2024-12-26 RX ADMIN — FOSAPREPITANT DIMEGLUMINE 500 MILLIGRAM(S): 150 INJECTION, POWDER, LYOPHILIZED, FOR SOLUTION INTRAVENOUS at 13:32

## 2024-12-26 RX ADMIN — PALONOSETRON HYDROCHLORIDE 0.25 MILLIGRAM(S): 0.25 INJECTION INTRAVENOUS at 13:32

## 2024-12-26 RX ADMIN — LEUCOVORIN CALCIUM 340 MILLIGRAM(S): 10 INJECTION INTRAMUSCULAR; INTRAVENOUS at 15:30

## 2024-12-27 ENCOUNTER — APPOINTMENT (OUTPATIENT)
Dept: INFUSION THERAPY | Facility: CLINIC | Age: 49
End: 2024-12-27

## 2024-12-27 ENCOUNTER — OUTPATIENT (OUTPATIENT)
Dept: OUTPATIENT SERVICES | Facility: HOSPITAL | Age: 49
LOS: 1 days | End: 2024-12-27
Payer: COMMERCIAL

## 2024-12-27 VITALS
HEART RATE: 76 BPM | TEMPERATURE: 98 F | OXYGEN SATURATION: 99 % | DIASTOLIC BLOOD PRESSURE: 82 MMHG | RESPIRATION RATE: 17 BRPM | SYSTOLIC BLOOD PRESSURE: 138 MMHG

## 2024-12-27 DIAGNOSIS — Z98.890 OTHER SPECIFIED POSTPROCEDURAL STATES: Chronic | ICD-10-CM

## 2024-12-27 DIAGNOSIS — C15.9 MALIGNANT NEOPLASM OF ESOPHAGUS, UNSPECIFIED: ICD-10-CM

## 2024-12-27 PROCEDURE — 96360 HYDRATION IV INFUSION INIT: CPT

## 2024-12-27 RX ORDER — SODIUM CHLORIDE 9 MG/ML
10 INJECTION, SOLUTION INTRAMUSCULAR; INTRAVENOUS; SUBCUTANEOUS ONCE
Refills: 0 | Status: COMPLETED | OUTPATIENT
Start: 2024-12-27 | End: 2024-12-27

## 2024-12-27 RX ADMIN — Medication 4420 MILLIGRAM(S): at 18:02

## 2024-12-27 RX ADMIN — SODIUM CHLORIDE 10 MILLILITER(S): 9 INJECTION, SOLUTION INTRAMUSCULAR; INTRAVENOUS; SUBCUTANEOUS at 18:05

## 2024-12-30 PROBLEM — K52.1 CHEMOTHERAPY-INDUCED DIARRHEA: Status: RESOLVED | Noted: 2024-08-01 | Resolved: 2024-12-30

## 2024-12-30 PROBLEM — R11.2 CHEMOTHERAPY-INDUCED NAUSEA AND VOMITING: Status: RESOLVED | Noted: 2024-08-01 | Resolved: 2024-12-30

## 2024-12-30 PROBLEM — D50.0 IRON DEFICIENCY ANEMIA DUE TO CHRONIC BLOOD LOSS: Status: RESOLVED | Noted: 2024-07-30 | Resolved: 2024-12-30

## 2024-12-30 PROBLEM — Z87.898 HISTORY OF WEIGHT LOSS: Status: RESOLVED | Noted: 2024-07-10 | Resolved: 2024-12-30

## 2024-12-30 PROBLEM — Z87.898 HISTORY OF SYNCOPE: Status: RESOLVED | Noted: 2024-07-10 | Resolved: 2024-12-30

## 2025-01-22 ENCOUNTER — OUTPATIENT (OUTPATIENT)
Dept: OUTPATIENT SERVICES | Facility: HOSPITAL | Age: 50
LOS: 1 days | End: 2025-01-22
Payer: COMMERCIAL

## 2025-01-22 ENCOUNTER — APPOINTMENT (OUTPATIENT)
Dept: CT IMAGING | Facility: CLINIC | Age: 50
End: 2025-01-22
Payer: COMMERCIAL

## 2025-01-22 DIAGNOSIS — C16.9 MALIGNANT NEOPLASM OF STOMACH, UNSPECIFIED: ICD-10-CM

## 2025-01-22 DIAGNOSIS — Z98.890 OTHER SPECIFIED POSTPROCEDURAL STATES: Chronic | ICD-10-CM

## 2025-01-22 PROCEDURE — 71260 CT THORAX DX C+: CPT | Mod: 26

## 2025-01-22 PROCEDURE — 74177 CT ABD & PELVIS W/CONTRAST: CPT

## 2025-01-22 PROCEDURE — 74177 CT ABD & PELVIS W/CONTRAST: CPT | Mod: 26

## 2025-01-22 PROCEDURE — 71260 CT THORAX DX C+: CPT

## 2025-01-28 ENCOUNTER — APPOINTMENT (OUTPATIENT)
Dept: SURGICAL ONCOLOGY | Facility: CLINIC | Age: 50
End: 2025-01-28
Payer: COMMERCIAL

## 2025-01-28 ENCOUNTER — APPOINTMENT (OUTPATIENT)
Dept: HEMATOLOGY ONCOLOGY | Facility: CLINIC | Age: 50
End: 2025-01-28
Payer: COMMERCIAL

## 2025-01-28 VITALS
RESPIRATION RATE: 16 BRPM | OXYGEN SATURATION: 97 % | HEIGHT: 67 IN | WEIGHT: 143 LBS | TEMPERATURE: 98.4 F | HEART RATE: 98 BPM | DIASTOLIC BLOOD PRESSURE: 94 MMHG | BODY MASS INDEX: 22.44 KG/M2 | SYSTOLIC BLOOD PRESSURE: 152 MMHG

## 2025-01-28 DIAGNOSIS — C16.9 MALIGNANT NEOPLASM OF STOMACH, UNSPECIFIED: ICD-10-CM

## 2025-01-28 PROCEDURE — 99213 OFFICE O/P EST LOW 20 MIN: CPT

## 2025-02-19 ENCOUNTER — OUTPATIENT (OUTPATIENT)
Dept: OUTPATIENT SERVICES | Facility: HOSPITAL | Age: 50
LOS: 1 days | End: 2025-02-19
Payer: COMMERCIAL

## 2025-02-19 ENCOUNTER — APPOINTMENT (OUTPATIENT)
Dept: INTERVENTIONAL RADIOLOGY/VASCULAR | Facility: HOSPITAL | Age: 50
End: 2025-02-19

## 2025-02-19 ENCOUNTER — RESULT REVIEW (OUTPATIENT)
Age: 50
End: 2025-02-19

## 2025-02-19 DIAGNOSIS — Z98.890 OTHER SPECIFIED POSTPROCEDURAL STATES: Chronic | ICD-10-CM

## 2025-02-19 PROCEDURE — 36590 REMOVAL TUNNELED CV CATH: CPT

## 2025-05-27 ENCOUNTER — OUTPATIENT (OUTPATIENT)
Dept: OUTPATIENT SERVICES | Facility: HOSPITAL | Age: 50
LOS: 1 days | End: 2025-05-27
Payer: COMMERCIAL

## 2025-05-27 ENCOUNTER — APPOINTMENT (OUTPATIENT)
Dept: CT IMAGING | Facility: CLINIC | Age: 50
End: 2025-05-27
Payer: COMMERCIAL

## 2025-05-27 DIAGNOSIS — Z98.890 OTHER SPECIFIED POSTPROCEDURAL STATES: Chronic | ICD-10-CM

## 2025-05-27 DIAGNOSIS — C16.9 MALIGNANT NEOPLASM OF STOMACH, UNSPECIFIED: ICD-10-CM

## 2025-05-27 PROCEDURE — 71260 CT THORAX DX C+: CPT | Mod: 26

## 2025-05-27 PROCEDURE — 71260 CT THORAX DX C+: CPT

## 2025-05-27 PROCEDURE — 74177 CT ABD & PELVIS W/CONTRAST: CPT | Mod: 26

## 2025-05-27 PROCEDURE — 74177 CT ABD & PELVIS W/CONTRAST: CPT

## 2025-06-03 ENCOUNTER — APPOINTMENT (OUTPATIENT)
Dept: SURGICAL ONCOLOGY | Facility: CLINIC | Age: 50
End: 2025-06-03
Payer: COMMERCIAL

## 2025-06-03 ENCOUNTER — APPOINTMENT (OUTPATIENT)
Dept: HEMATOLOGY ONCOLOGY | Facility: CLINIC | Age: 50
End: 2025-06-03

## 2025-06-03 VITALS
DIASTOLIC BLOOD PRESSURE: 102 MMHG | OXYGEN SATURATION: 98 % | HEART RATE: 78 BPM | BODY MASS INDEX: 24.64 KG/M2 | TEMPERATURE: 98.3 F | SYSTOLIC BLOOD PRESSURE: 147 MMHG | HEIGHT: 67 IN | WEIGHT: 157 LBS | RESPIRATION RATE: 16 BRPM

## 2025-06-03 DIAGNOSIS — Z90.3 ACQUIRED ABSENCE OF STOMACH [PART OF]: ICD-10-CM

## 2025-06-03 DIAGNOSIS — C16.9 MALIGNANT NEOPLASM OF STOMACH, UNSPECIFIED: ICD-10-CM

## 2025-06-03 PROCEDURE — 99213 OFFICE O/P EST LOW 20 MIN: CPT
